# Patient Record
Sex: FEMALE | Race: WHITE | NOT HISPANIC OR LATINO | Employment: OTHER | URBAN - METROPOLITAN AREA
[De-identification: names, ages, dates, MRNs, and addresses within clinical notes are randomized per-mention and may not be internally consistent; named-entity substitution may affect disease eponyms.]

---

## 2017-01-01 ENCOUNTER — COMMUNICATION - HEALTHEAST (OUTPATIENT)
Dept: INTERNAL MEDICINE | Facility: CLINIC | Age: 72
End: 2017-01-01

## 2017-02-05 ENCOUNTER — COMMUNICATION - HEALTHEAST (OUTPATIENT)
Dept: INTERNAL MEDICINE | Facility: CLINIC | Age: 72
End: 2017-02-05

## 2017-02-05 DIAGNOSIS — G40.909 EPILEPSY (H): ICD-10-CM

## 2017-02-23 ENCOUNTER — OFFICE VISIT - HEALTHEAST (OUTPATIENT)
Dept: FAMILY MEDICINE | Facility: CLINIC | Age: 72
End: 2017-02-23

## 2017-02-23 DIAGNOSIS — R21 RASH AND NONSPECIFIC SKIN ERUPTION: ICD-10-CM

## 2017-03-09 ENCOUNTER — COMMUNICATION - HEALTHEAST (OUTPATIENT)
Dept: INTERNAL MEDICINE | Facility: CLINIC | Age: 72
End: 2017-03-09

## 2017-06-08 ENCOUNTER — OFFICE VISIT - HEALTHEAST (OUTPATIENT)
Dept: INTERNAL MEDICINE | Facility: CLINIC | Age: 72
End: 2017-06-08

## 2017-06-08 ENCOUNTER — RECORDS - HEALTHEAST (OUTPATIENT)
Dept: GENERAL RADIOLOGY | Age: 72
End: 2017-06-08

## 2017-06-08 DIAGNOSIS — M25.551 PAIN IN RIGHT HIP: ICD-10-CM

## 2017-06-08 DIAGNOSIS — R55 SYNCOPE AND COLLAPSE: ICD-10-CM

## 2017-06-08 DIAGNOSIS — M25.551 RIGHT HIP PAIN: ICD-10-CM

## 2017-06-08 DIAGNOSIS — G40.909 EPILEPSY (H): ICD-10-CM

## 2017-06-08 LAB
ATRIAL RATE - MUSE: 71 BPM
DIASTOLIC BLOOD PRESSURE - MUSE: NORMAL MMHG
INTERPRETATION ECG - MUSE: NORMAL
P AXIS - MUSE: -2 DEGREES
PR INTERVAL - MUSE: 110 MS
QRS DURATION - MUSE: 86 MS
QT - MUSE: 406 MS
QTC - MUSE: 441 MS
R AXIS - MUSE: -1 DEGREES
SYSTOLIC BLOOD PRESSURE - MUSE: NORMAL MMHG
T AXIS - MUSE: 5 DEGREES
VENTRICULAR RATE- MUSE: 71 BPM

## 2017-06-12 ENCOUNTER — COMMUNICATION - HEALTHEAST (OUTPATIENT)
Dept: INTERNAL MEDICINE | Facility: CLINIC | Age: 72
End: 2017-06-12

## 2017-06-28 ENCOUNTER — RECORDS - HEALTHEAST (OUTPATIENT)
Dept: ADMINISTRATIVE | Facility: OTHER | Age: 72
End: 2017-06-28

## 2017-07-06 ENCOUNTER — HOSPITAL ENCOUNTER (OUTPATIENT)
Dept: CT IMAGING | Facility: HOSPITAL | Age: 72
Discharge: HOME | End: 2017-07-06
Attending: PSYCHIATRY & NEUROLOGY

## 2017-07-06 DIAGNOSIS — G40.209 COMPLEX PARTIAL SEIZURE (H): ICD-10-CM

## 2017-07-26 ENCOUNTER — RECORDS - HEALTHEAST (OUTPATIENT)
Dept: ADMINISTRATIVE | Facility: OTHER | Age: 72
End: 2017-07-26

## 2017-07-31 ENCOUNTER — OFFICE VISIT - HEALTHEAST (OUTPATIENT)
Dept: FAMILY MEDICINE | Facility: CLINIC | Age: 72
End: 2017-07-31

## 2017-07-31 DIAGNOSIS — M54.9 BACK PAIN: ICD-10-CM

## 2017-08-01 ENCOUNTER — AMBULATORY - HEALTHEAST (OUTPATIENT)
Dept: NEUROSURGERY | Facility: CLINIC | Age: 72
End: 2017-08-01

## 2017-08-02 ENCOUNTER — COMMUNICATION - HEALTHEAST (OUTPATIENT)
Dept: NEUROSURGERY | Facility: CLINIC | Age: 72
End: 2017-08-02

## 2017-08-03 ENCOUNTER — HOSPITAL ENCOUNTER (OUTPATIENT)
Dept: PHYSICAL MEDICINE AND REHAB | Facility: CLINIC | Age: 72
Discharge: HOME | End: 2017-08-03
Attending: PHYSICIAN ASSISTANT

## 2017-08-03 DIAGNOSIS — M79.18 MYOFASCIAL PAIN: ICD-10-CM

## 2017-08-03 DIAGNOSIS — M54.50 LUMBALGIA: ICD-10-CM

## 2017-08-03 DIAGNOSIS — G47.9 SLEEP DISTURBANCE: ICD-10-CM

## 2017-08-04 ENCOUNTER — COMMUNICATION - HEALTHEAST (OUTPATIENT)
Dept: INTERNAL MEDICINE | Facility: CLINIC | Age: 72
End: 2017-08-04

## 2017-08-04 DIAGNOSIS — G40.909 EPILEPSY (H): ICD-10-CM

## 2017-08-07 ENCOUNTER — AMBULATORY - HEALTHEAST (OUTPATIENT)
Dept: NEUROSURGERY | Facility: CLINIC | Age: 72
End: 2017-08-07

## 2017-08-08 ENCOUNTER — HOSPITAL ENCOUNTER (OUTPATIENT)
Dept: CT IMAGING | Facility: HOSPITAL | Age: 72
Discharge: HOME | End: 2017-08-08
Attending: PHYSICIAN ASSISTANT

## 2017-08-08 DIAGNOSIS — M54.50 LUMBALGIA: ICD-10-CM

## 2017-08-08 DIAGNOSIS — G47.9 SLEEP DISTURBANCE: ICD-10-CM

## 2017-08-08 DIAGNOSIS — M79.18 MYOFASCIAL PAIN: ICD-10-CM

## 2017-08-10 ENCOUNTER — HOSPITAL ENCOUNTER (OUTPATIENT)
Dept: PHYSICAL MEDICINE AND REHAB | Facility: CLINIC | Age: 72
Discharge: HOME | End: 2017-08-10
Attending: PHYSICIAN ASSISTANT

## 2017-08-10 DIAGNOSIS — M47.816 FACET ARTHROPATHY, LUMBAR: ICD-10-CM

## 2017-08-10 DIAGNOSIS — M79.18 MYOFASCIAL PAIN: ICD-10-CM

## 2017-08-10 DIAGNOSIS — M54.50 LUMBALGIA: ICD-10-CM

## 2017-08-10 DIAGNOSIS — G47.9 SLEEP DISTURBANCE: ICD-10-CM

## 2017-08-16 ENCOUNTER — OFFICE VISIT - HEALTHEAST (OUTPATIENT)
Dept: PHYSICAL THERAPY | Facility: CLINIC | Age: 72
End: 2017-08-16

## 2017-08-16 DIAGNOSIS — G89.29 CHRONIC BILATERAL LOW BACK PAIN WITHOUT SCIATICA: ICD-10-CM

## 2017-08-16 DIAGNOSIS — M53.86 DECREASED ROM OF LUMBAR SPINE: ICD-10-CM

## 2017-08-16 DIAGNOSIS — M54.50 CHRONIC BILATERAL LOW BACK PAIN WITHOUT SCIATICA: ICD-10-CM

## 2017-08-16 DIAGNOSIS — M81.0 OSTEOPOROSIS: ICD-10-CM

## 2017-08-16 DIAGNOSIS — M62.81 GENERALIZED MUSCLE WEAKNESS: ICD-10-CM

## 2017-08-23 ENCOUNTER — OFFICE VISIT - HEALTHEAST (OUTPATIENT)
Dept: PHYSICAL THERAPY | Facility: CLINIC | Age: 72
End: 2017-08-23

## 2017-08-23 DIAGNOSIS — M81.0 OSTEOPOROSIS: ICD-10-CM

## 2017-08-23 DIAGNOSIS — M62.81 GENERALIZED MUSCLE WEAKNESS: ICD-10-CM

## 2017-08-23 DIAGNOSIS — G89.29 CHRONIC BILATERAL LOW BACK PAIN WITHOUT SCIATICA: ICD-10-CM

## 2017-08-23 DIAGNOSIS — M54.50 CHRONIC BILATERAL LOW BACK PAIN WITHOUT SCIATICA: ICD-10-CM

## 2017-08-23 DIAGNOSIS — M53.86 DECREASED ROM OF LUMBAR SPINE: ICD-10-CM

## 2017-08-30 ENCOUNTER — RECORDS - HEALTHEAST (OUTPATIENT)
Dept: ADMINISTRATIVE | Facility: OTHER | Age: 72
End: 2017-08-30

## 2017-09-14 ENCOUNTER — HOSPITAL ENCOUNTER (OUTPATIENT)
Dept: PHYSICAL MEDICINE AND REHAB | Facility: CLINIC | Age: 72
Discharge: HOME | End: 2017-09-14
Attending: PHYSICIAN ASSISTANT

## 2017-09-14 DIAGNOSIS — M47.816 FACET ARTHROPATHY, LUMBAR: ICD-10-CM

## 2017-09-14 DIAGNOSIS — M54.50 LUMBALGIA: ICD-10-CM

## 2017-09-14 DIAGNOSIS — M79.18 MYOFASCIAL PAIN: ICD-10-CM

## 2017-09-14 DIAGNOSIS — G47.9 SLEEP DISTURBANCE: ICD-10-CM

## 2017-10-28 ENCOUNTER — COMMUNICATION - HEALTHEAST (OUTPATIENT)
Dept: INTERNAL MEDICINE | Facility: CLINIC | Age: 72
End: 2017-10-28

## 2017-11-06 ENCOUNTER — COMMUNICATION - HEALTHEAST (OUTPATIENT)
Dept: PHYSICAL MEDICINE AND REHAB | Facility: CLINIC | Age: 72
End: 2017-11-06

## 2017-11-06 DIAGNOSIS — G47.9 SLEEP DISTURBANCE: ICD-10-CM

## 2017-11-06 DIAGNOSIS — M54.50 LUMBALGIA: ICD-10-CM

## 2017-11-06 DIAGNOSIS — M79.18 MYOFASCIAL PAIN: ICD-10-CM

## 2017-11-08 ENCOUNTER — AMBULATORY - HEALTHEAST (OUTPATIENT)
Dept: PHYSICAL MEDICINE AND REHAB | Facility: CLINIC | Age: 72
End: 2017-11-08

## 2017-11-08 DIAGNOSIS — M54.50 LUMBALGIA: ICD-10-CM

## 2017-11-14 ENCOUNTER — OFFICE VISIT - HEALTHEAST (OUTPATIENT)
Dept: INTERNAL MEDICINE | Facility: CLINIC | Age: 72
End: 2017-11-14

## 2017-11-14 DIAGNOSIS — R30.0 DYSURIA: ICD-10-CM

## 2017-11-14 ASSESSMENT — MIFFLIN-ST. JEOR: SCORE: 1124.11

## 2017-12-06 ENCOUNTER — COMMUNICATION - HEALTHEAST (OUTPATIENT)
Dept: PHYSICAL MEDICINE AND REHAB | Facility: CLINIC | Age: 72
End: 2017-12-06

## 2017-12-06 DIAGNOSIS — M54.50 LUMBALGIA: ICD-10-CM

## 2017-12-06 DIAGNOSIS — M79.18 MYOFASCIAL PAIN: ICD-10-CM

## 2017-12-06 DIAGNOSIS — G47.9 SLEEP DISTURBANCE: ICD-10-CM

## 2017-12-26 ENCOUNTER — OFFICE VISIT - HEALTHEAST (OUTPATIENT)
Dept: INTERNAL MEDICINE | Facility: CLINIC | Age: 72
End: 2017-12-26

## 2017-12-26 DIAGNOSIS — L98.9 SKIN LESIONS: ICD-10-CM

## 2017-12-26 DIAGNOSIS — M54.50 ACUTE BILATERAL LOW BACK PAIN WITHOUT SCIATICA: ICD-10-CM

## 2018-03-29 ENCOUNTER — OFFICE VISIT - HEALTHEAST (OUTPATIENT)
Dept: INTERNAL MEDICINE | Facility: CLINIC | Age: 73
End: 2018-03-29

## 2018-03-29 DIAGNOSIS — Z86.73 HISTORY OF CVA (CEREBROVASCULAR ACCIDENT): ICD-10-CM

## 2018-03-29 DIAGNOSIS — R41.3 MEMORY CHANGES: ICD-10-CM

## 2018-03-29 DIAGNOSIS — L98.9 SKIN LESION: ICD-10-CM

## 2018-03-29 DIAGNOSIS — G40.909 SEIZURE DISORDER (H): ICD-10-CM

## 2018-04-25 ENCOUNTER — OFFICE VISIT - HEALTHEAST (OUTPATIENT)
Dept: INTERNAL MEDICINE | Facility: CLINIC | Age: 73
End: 2018-04-25

## 2018-04-25 DIAGNOSIS — R41.3 MEMORY CHANGE: ICD-10-CM

## 2018-04-25 DIAGNOSIS — G40.909 SEIZURE DISORDER (H): ICD-10-CM

## 2018-04-25 DIAGNOSIS — E78.5 HYPERLIPIDEMIA: ICD-10-CM

## 2018-04-25 DIAGNOSIS — G40.909 EPILEPSY (H): ICD-10-CM

## 2018-04-25 DIAGNOSIS — Z66 DNR (DO NOT RESUSCITATE): ICD-10-CM

## 2018-04-25 DIAGNOSIS — M85.80 OSTEOPENIA: ICD-10-CM

## 2018-05-01 ENCOUNTER — AMBULATORY - HEALTHEAST (OUTPATIENT)
Dept: LAB | Facility: CLINIC | Age: 73
End: 2018-05-01

## 2018-05-01 DIAGNOSIS — G40.909 SEIZURE DISORDER (H): ICD-10-CM

## 2018-05-01 DIAGNOSIS — E78.5 HYPERLIPIDEMIA: ICD-10-CM

## 2018-05-01 LAB
ALBUMIN SERPL-MCNC: 3.8 G/DL (ref 3.5–5)
ALP SERPL-CCNC: 121 U/L (ref 45–120)
ALT SERPL W P-5'-P-CCNC: 21 U/L (ref 0–45)
ANION GAP SERPL CALCULATED.3IONS-SCNC: 7 MMOL/L (ref 5–18)
AST SERPL W P-5'-P-CCNC: 22 U/L (ref 0–40)
BILIRUB SERPL-MCNC: 0.3 MG/DL (ref 0–1)
BUN SERPL-MCNC: 15 MG/DL (ref 8–28)
CALCIUM SERPL-MCNC: 9.9 MG/DL (ref 8.5–10.5)
CHLORIDE BLD-SCNC: 106 MMOL/L (ref 98–107)
CHOLEST SERPL-MCNC: 182 MG/DL
CO2 SERPL-SCNC: 29 MMOL/L (ref 22–31)
CREAT SERPL-MCNC: 0.96 MG/DL (ref 0.6–1.1)
ERYTHROCYTE [DISTWIDTH] IN BLOOD BY AUTOMATED COUNT: 11 % (ref 11–14.5)
FASTING STATUS PATIENT QL REPORTED: YES
GFR SERPL CREATININE-BSD FRML MDRD: 57 ML/MIN/1.73M2
GLUCOSE BLD-MCNC: 97 MG/DL (ref 70–125)
HCT VFR BLD AUTO: 43.6 % (ref 35–47)
HDLC SERPL-MCNC: 79 MG/DL
HGB BLD-MCNC: 14.7 G/DL (ref 12–16)
LDLC SERPL CALC-MCNC: 87 MG/DL
MCH RBC QN AUTO: 33.5 PG (ref 27–34)
MCHC RBC AUTO-ENTMCNC: 33.8 G/DL (ref 32–36)
MCV RBC AUTO: 99 FL (ref 80–100)
PLATELET # BLD AUTO: 236 THOU/UL (ref 140–440)
PMV BLD AUTO: 7.4 FL (ref 7–10)
POTASSIUM BLD-SCNC: 5.3 MMOL/L (ref 3.5–5)
PROT SERPL-MCNC: 6.8 G/DL (ref 6–8)
RBC # BLD AUTO: 4.4 MILL/UL (ref 3.8–5.4)
SODIUM SERPL-SCNC: 142 MMOL/L (ref 136–145)
TRIGL SERPL-MCNC: 81 MG/DL
WBC: 5.3 THOU/UL (ref 4–11)

## 2018-05-02 ENCOUNTER — AMBULATORY - HEALTHEAST (OUTPATIENT)
Dept: INTERNAL MEDICINE | Facility: CLINIC | Age: 73
End: 2018-05-02

## 2018-05-02 DIAGNOSIS — E87.5 HYPERKALEMIA: ICD-10-CM

## 2018-05-03 ENCOUNTER — COMMUNICATION - HEALTHEAST (OUTPATIENT)
Dept: INTERNAL MEDICINE | Facility: CLINIC | Age: 73
End: 2018-05-03

## 2018-07-09 ENCOUNTER — OFFICE VISIT - HEALTHEAST (OUTPATIENT)
Dept: FAMILY MEDICINE | Facility: CLINIC | Age: 73
End: 2018-07-09

## 2018-07-09 DIAGNOSIS — S89.92XA KNEE INJURY, LEFT, INITIAL ENCOUNTER: ICD-10-CM

## 2018-07-09 DIAGNOSIS — M25.462 KNEE EFFUSION, LEFT: ICD-10-CM

## 2018-07-23 ENCOUNTER — RECORDS - HEALTHEAST (OUTPATIENT)
Dept: ADMINISTRATIVE | Facility: OTHER | Age: 73
End: 2018-07-23

## 2018-07-30 ENCOUNTER — HOSPITAL ENCOUNTER (OUTPATIENT)
Dept: CT IMAGING | Facility: HOSPITAL | Age: 73
Discharge: HOME | End: 2018-07-30
Attending: PSYCHIATRY & NEUROLOGY

## 2018-07-30 ENCOUNTER — COMMUNICATION - HEALTHEAST (OUTPATIENT)
Dept: TELEHEALTH | Facility: CLINIC | Age: 73
End: 2018-07-30

## 2018-07-30 DIAGNOSIS — G40.209 COMPLEX PARTIAL SEIZURE DISORDER (H): ICD-10-CM

## 2018-07-30 DIAGNOSIS — I67.1 CEREBRAL ANEURYSM, NONRUPTURED: ICD-10-CM

## 2018-07-30 DIAGNOSIS — R41.89 COGNITIVE DECLINE: ICD-10-CM

## 2018-07-31 ENCOUNTER — RECORDS - HEALTHEAST (OUTPATIENT)
Dept: LAB | Facility: HOSPITAL | Age: 73
End: 2018-07-31

## 2018-07-31 ENCOUNTER — AMBULATORY - HEALTHEAST (OUTPATIENT)
Dept: LAB | Facility: CLINIC | Age: 73
End: 2018-07-31

## 2018-07-31 ENCOUNTER — RECORDS - HEALTHEAST (OUTPATIENT)
Dept: ADMINISTRATIVE | Facility: OTHER | Age: 73
End: 2018-07-31

## 2018-07-31 DIAGNOSIS — E87.5 HYPERKALEMIA: ICD-10-CM

## 2018-07-31 LAB
FOLATE SERPL-MCNC: 12 NG/ML
PHENYTOIN (DILATIN) FREE: 2 UG/ML (ref 1–2)
PHENYTOIN SERPL-MCNC: 20.6 UG/ML (ref 10–20)
TSH SERPL DL<=0.005 MIU/L-ACNC: 3.03 UIU/ML (ref 0.3–5)
VIT B12 SERPL-MCNC: 685 PG/ML (ref 213–816)

## 2018-08-01 ENCOUNTER — COMMUNICATION - HEALTHEAST (OUTPATIENT)
Dept: FAMILY MEDICINE | Facility: CLINIC | Age: 73
End: 2018-08-01

## 2018-08-01 ENCOUNTER — COMMUNICATION - HEALTHEAST (OUTPATIENT)
Dept: SCHEDULING | Facility: CLINIC | Age: 73
End: 2018-08-01

## 2018-08-01 LAB
B BURGDOR IGG+IGM SER QL: 0.31 INDEX VALUE
POTASSIUM BLD-SCNC: 6.1 MMOL/L (ref 3.5–5)
T PALLIDUM AB SER QL: NEGATIVE

## 2018-08-02 LAB — METHYLMALONATE SERPL-SCNC: 0.27 UMOL/L (ref 0–0.4)

## 2018-11-06 ENCOUNTER — OFFICE VISIT - HEALTHEAST (OUTPATIENT)
Dept: NEUROSURGERY | Facility: CLINIC | Age: 73
End: 2018-11-06

## 2018-11-06 DIAGNOSIS — I67.1 ANEURYSM OF INTRACRANIAL PORTION OF RIGHT INTERNAL CAROTID ARTERY: ICD-10-CM

## 2018-11-06 DIAGNOSIS — I67.1 ANEURYSM OF INTRACRANIAL PORTION OF LEFT INTERNAL CAROTID ARTERY: ICD-10-CM

## 2018-11-06 ASSESSMENT — MIFFLIN-ST. JEOR: SCORE: 1047

## 2018-12-05 ENCOUNTER — AMBULATORY - HEALTHEAST (OUTPATIENT)
Dept: INTENSIVE CARE | Facility: CLINIC | Age: 73
End: 2018-12-05

## 2018-12-05 DIAGNOSIS — I67.1 CEREBRAL ANEURYSM, NONRUPTURED: ICD-10-CM

## 2018-12-11 ENCOUNTER — COMMUNICATION - HEALTHEAST (OUTPATIENT)
Dept: SCHEDULING | Facility: CLINIC | Age: 73
End: 2018-12-11

## 2018-12-11 ENCOUNTER — OFFICE VISIT - HEALTHEAST (OUTPATIENT)
Dept: INTERNAL MEDICINE | Facility: CLINIC | Age: 73
End: 2018-12-11

## 2018-12-11 DIAGNOSIS — Z01.818 PREOPERATIVE EXAMINATION: ICD-10-CM

## 2018-12-11 DIAGNOSIS — M85.80 OSTEOPENIA, UNSPECIFIED LOCATION: ICD-10-CM

## 2018-12-11 DIAGNOSIS — R41.89 COGNITIVE DECLINE: ICD-10-CM

## 2018-12-11 DIAGNOSIS — I67.1 NONRUPTURED CEREBRAL ANEURYSM: ICD-10-CM

## 2018-12-11 DIAGNOSIS — G40.209 PARTIAL SYMPTOMATIC EPILEPSY WITH COMPLEX PARTIAL SEIZURES, NOT INTRACTABLE, WITHOUT STATUS EPILEPTICUS (H): ICD-10-CM

## 2018-12-11 LAB
ANION GAP SERPL CALCULATED.3IONS-SCNC: 12 MMOL/L (ref 5–18)
BUN SERPL-MCNC: 19 MG/DL (ref 8–28)
CALCIUM SERPL-MCNC: 10.2 MG/DL (ref 8.5–10.5)
CHLORIDE BLD-SCNC: 106 MMOL/L (ref 98–107)
CO2 SERPL-SCNC: 26 MMOL/L (ref 22–31)
CREAT SERPL-MCNC: 0.81 MG/DL (ref 0.6–1.1)
ERYTHROCYTE [DISTWIDTH] IN BLOOD BY AUTOMATED COUNT: 10.8 % (ref 11–14.5)
GFR SERPL CREATININE-BSD FRML MDRD: >60 ML/MIN/1.73M2
GLUCOSE BLD-MCNC: 54 MG/DL (ref 70–125)
HCT VFR BLD AUTO: 44.6 % (ref 35–47)
HGB BLD-MCNC: 15.5 G/DL (ref 12–16)
MCH RBC QN AUTO: 33.8 PG (ref 27–34)
MCHC RBC AUTO-ENTMCNC: 34.8 G/DL (ref 32–36)
MCV RBC AUTO: 97 FL (ref 80–100)
PLATELET # BLD AUTO: 267 THOU/UL (ref 140–440)
PMV BLD AUTO: 7.4 FL (ref 7–10)
POTASSIUM BLD-SCNC: 5 MMOL/L (ref 3.5–5)
RBC # BLD AUTO: 4.59 MILL/UL (ref 3.8–5.4)
SODIUM SERPL-SCNC: 144 MMOL/L (ref 136–145)
WBC: 6.7 THOU/UL (ref 4–11)

## 2018-12-11 ASSESSMENT — MIFFLIN-ST. JEOR: SCORE: 1011.8

## 2018-12-12 ENCOUNTER — COMMUNICATION - HEALTHEAST (OUTPATIENT)
Dept: SCHEDULING | Facility: CLINIC | Age: 73
End: 2018-12-12

## 2018-12-12 ENCOUNTER — COMMUNICATION - HEALTHEAST (OUTPATIENT)
Dept: INTERNAL MEDICINE | Facility: CLINIC | Age: 73
End: 2018-12-12

## 2018-12-12 ENCOUNTER — AMBULATORY - HEALTHEAST (OUTPATIENT)
Dept: LAB | Facility: CLINIC | Age: 73
End: 2018-12-12

## 2018-12-12 ENCOUNTER — AMBULATORY - HEALTHEAST (OUTPATIENT)
Dept: INTERNAL MEDICINE | Facility: CLINIC | Age: 73
End: 2018-12-12

## 2018-12-12 DIAGNOSIS — G40.209 PARTIAL SYMPTOMATIC EPILEPSY WITH COMPLEX PARTIAL SEIZURES, NOT INTRACTABLE, WITHOUT STATUS EPILEPTICUS (H): ICD-10-CM

## 2018-12-12 DIAGNOSIS — G40.909 SEIZURE DISORDER (H): ICD-10-CM

## 2018-12-12 LAB
ALBUMIN SERPL-MCNC: 4.2 G/DL (ref 3.5–5)
ALP SERPL-CCNC: 118 U/L (ref 45–120)
ALT SERPL W P-5'-P-CCNC: 22 U/L (ref 0–45)
AST SERPL W P-5'-P-CCNC: 24 U/L (ref 0–40)
BILIRUB DIRECT SERPL-MCNC: 0.1 MG/DL
BILIRUB SERPL-MCNC: 0.3 MG/DL (ref 0–1)
PHENYTOIN (DILATIN) FREE: 2.8 UG/ML (ref 1–2)
PHENYTOIN SERPL-MCNC: 29.9 UG/ML (ref 10–20)
PROT SERPL-MCNC: 7.3 G/DL (ref 6–8)

## 2018-12-13 ENCOUNTER — ANESTHESIA - HEALTHEAST (OUTPATIENT)
Dept: INTERVENTIONAL RADIOLOGY/VASCULAR | Facility: CLINIC | Age: 73
End: 2018-12-13

## 2018-12-13 ENCOUNTER — COMMUNICATION - HEALTHEAST (OUTPATIENT)
Dept: INTERNAL MEDICINE | Facility: CLINIC | Age: 73
End: 2018-12-13

## 2018-12-13 LAB — PHENYTOIN (DILATIN) FREE: 2.5 UG/ML (ref 1–2)

## 2018-12-14 ENCOUNTER — HOSPITAL ENCOUNTER (OUTPATIENT)
Dept: RADIOLOGY | Facility: CLINIC | Age: 73
Discharge: HOME | End: 2018-12-14

## 2019-01-22 ENCOUNTER — OFFICE VISIT - HEALTHEAST (OUTPATIENT)
Dept: INTERNAL MEDICINE | Facility: CLINIC | Age: 74
End: 2019-01-22

## 2019-01-22 ENCOUNTER — AMBULATORY - HEALTHEAST (OUTPATIENT)
Dept: SCHEDULING | Facility: CLINIC | Age: 74
End: 2019-01-22

## 2019-01-22 DIAGNOSIS — R41.89 COGNITIVE DECLINE: ICD-10-CM

## 2019-01-22 DIAGNOSIS — Z12.31 VISIT FOR SCREENING MAMMOGRAM: ICD-10-CM

## 2019-01-22 DIAGNOSIS — I67.1 CEREBRAL ANEURYSM, NONRUPTURED: ICD-10-CM

## 2019-01-22 DIAGNOSIS — G40.209 PARTIAL SYMPTOMATIC EPILEPSY WITH COMPLEX PARTIAL SEIZURES, NOT INTRACTABLE, WITHOUT STATUS EPILEPTICUS (H): ICD-10-CM

## 2019-01-22 DIAGNOSIS — Z78.0 POST-MENOPAUSAL: ICD-10-CM

## 2019-02-18 ENCOUNTER — HOSPITAL ENCOUNTER (OUTPATIENT)
Dept: MAMMOGRAPHY | Facility: CLINIC | Age: 74
Discharge: HOME | End: 2019-02-18

## 2019-02-18 DIAGNOSIS — Z12.31 VISIT FOR SCREENING MAMMOGRAM: ICD-10-CM

## 2019-04-26 ENCOUNTER — COMMUNICATION - HEALTHEAST (OUTPATIENT)
Dept: INTERNAL MEDICINE | Facility: CLINIC | Age: 74
End: 2019-04-26

## 2019-04-26 DIAGNOSIS — E78.5 HYPERLIPIDEMIA, UNSPECIFIED HYPERLIPIDEMIA TYPE: ICD-10-CM

## 2019-09-01 ENCOUNTER — COMMUNICATION - HEALTHEAST (OUTPATIENT)
Dept: INTERNAL MEDICINE | Facility: CLINIC | Age: 74
End: 2019-09-01

## 2019-09-01 DIAGNOSIS — G40.909 EPILEPSY (H): ICD-10-CM

## 2019-09-18 ENCOUNTER — OFFICE VISIT - HEALTHEAST (OUTPATIENT)
Dept: INTERNAL MEDICINE | Facility: CLINIC | Age: 74
End: 2019-09-18

## 2019-09-18 DIAGNOSIS — I67.1 CEREBRAL ANEURYSM: ICD-10-CM

## 2019-09-18 DIAGNOSIS — I67.1 CEREBRAL ANEURYSM, NONRUPTURED: ICD-10-CM

## 2019-09-18 DIAGNOSIS — E78.2 MIXED HYPERLIPIDEMIA: ICD-10-CM

## 2019-09-18 DIAGNOSIS — Z72.0 TOBACCO USE: ICD-10-CM

## 2019-09-18 DIAGNOSIS — Z78.0 POST-MENOPAUSAL: ICD-10-CM

## 2019-09-18 DIAGNOSIS — G40.209 PARTIAL SYMPTOMATIC EPILEPSY WITH COMPLEX PARTIAL SEIZURES, NOT INTRACTABLE, WITHOUT STATUS EPILEPTICUS (H): ICD-10-CM

## 2019-09-18 LAB
ALBUMIN SERPL-MCNC: 4.1 G/DL (ref 3.5–5)
ALP SERPL-CCNC: 118 U/L (ref 45–120)
ALT SERPL W P-5'-P-CCNC: 13 U/L (ref 0–45)
ANION GAP SERPL CALCULATED.3IONS-SCNC: 9 MMOL/L (ref 5–18)
AST SERPL W P-5'-P-CCNC: 20 U/L (ref 0–40)
BILIRUB DIRECT SERPL-MCNC: 0.1 MG/DL
BILIRUB SERPL-MCNC: 0.3 MG/DL (ref 0–1)
BUN SERPL-MCNC: 15 MG/DL (ref 8–28)
CALCIUM SERPL-MCNC: 9.9 MG/DL (ref 8.5–10.5)
CHLORIDE BLD-SCNC: 104 MMOL/L (ref 98–107)
CHOLEST SERPL-MCNC: 195 MG/DL
CO2 SERPL-SCNC: 29 MMOL/L (ref 22–31)
CREAT SERPL-MCNC: 0.93 MG/DL (ref 0.6–1.1)
ERYTHROCYTE [DISTWIDTH] IN BLOOD BY AUTOMATED COUNT: 11.2 % (ref 11–14.5)
FASTING STATUS PATIENT QL REPORTED: NO
GFR SERPL CREATININE-BSD FRML MDRD: 59 ML/MIN/1.73M2
GLUCOSE BLD-MCNC: 64 MG/DL (ref 70–125)
HCT VFR BLD AUTO: 43.6 % (ref 35–47)
HDLC SERPL-MCNC: 82 MG/DL
HGB BLD-MCNC: 15.1 G/DL (ref 12–16)
LDLC SERPL CALC-MCNC: 93 MG/DL
MCH RBC QN AUTO: 34.2 PG (ref 27–34)
MCHC RBC AUTO-ENTMCNC: 34.7 G/DL (ref 32–36)
MCV RBC AUTO: 99 FL (ref 80–100)
PHENYTOIN SERPL-MCNC: 14.4 UG/ML (ref 10–20)
PLATELET # BLD AUTO: 273 THOU/UL (ref 140–440)
PMV BLD AUTO: 7.2 FL (ref 7–10)
POTASSIUM BLD-SCNC: 4.7 MMOL/L (ref 3.5–5)
PROT SERPL-MCNC: 6.9 G/DL (ref 6–8)
RBC # BLD AUTO: 4.42 MILL/UL (ref 3.8–5.4)
SODIUM SERPL-SCNC: 142 MMOL/L (ref 136–145)
TRIGL SERPL-MCNC: 100 MG/DL
WBC: 6 THOU/UL (ref 4–11)

## 2019-09-18 ASSESSMENT — MIFFLIN-ST. JEOR: SCORE: 1010.71

## 2019-09-19 ENCOUNTER — COMMUNICATION - HEALTHEAST (OUTPATIENT)
Dept: INTERNAL MEDICINE | Facility: CLINIC | Age: 74
End: 2019-09-19

## 2019-09-25 ENCOUNTER — COMMUNICATION - HEALTHEAST (OUTPATIENT)
Dept: NEUROSURGERY | Facility: CLINIC | Age: 74
End: 2019-09-25

## 2019-09-27 ENCOUNTER — COMMUNICATION - HEALTHEAST (OUTPATIENT)
Dept: INTERNAL MEDICINE | Facility: CLINIC | Age: 74
End: 2019-09-27

## 2019-09-27 DIAGNOSIS — G40.909 EPILEPSY (H): ICD-10-CM

## 2019-10-07 ENCOUNTER — COMMUNICATION - HEALTHEAST (OUTPATIENT)
Dept: INTERNAL MEDICINE | Facility: CLINIC | Age: 74
End: 2019-10-07

## 2019-10-07 DIAGNOSIS — G40.909 EPILEPSY (H): ICD-10-CM

## 2019-10-08 ENCOUNTER — COMMUNICATION - HEALTHEAST (OUTPATIENT)
Dept: ADMINISTRATIVE | Facility: CLINIC | Age: 74
End: 2019-10-08

## 2019-10-09 ENCOUNTER — OFFICE VISIT - HEALTHEAST (OUTPATIENT)
Dept: INTERNAL MEDICINE | Facility: CLINIC | Age: 74
End: 2019-10-09

## 2019-10-09 DIAGNOSIS — R41.89 COGNITIVE DECLINE: ICD-10-CM

## 2019-10-09 DIAGNOSIS — R07.81 RIB PAIN ON RIGHT SIDE: ICD-10-CM

## 2019-10-09 DIAGNOSIS — L82.1 SEBORRHEIC KERATOSES: ICD-10-CM

## 2019-10-09 DIAGNOSIS — I67.1 CEREBRAL ANEURYSM, NONRUPTURED: ICD-10-CM

## 2019-10-09 DIAGNOSIS — I67.1 CEREBRAL ANEURYSM: ICD-10-CM

## 2019-10-09 DIAGNOSIS — M85.88 OSTEOPENIA OF LUMBAR SPINE: ICD-10-CM

## 2019-10-15 ENCOUNTER — RECORDS - HEALTHEAST (OUTPATIENT)
Dept: ADMINISTRATIVE | Facility: OTHER | Age: 74
End: 2019-10-15

## 2019-10-16 ENCOUNTER — AMBULATORY - HEALTHEAST (OUTPATIENT)
Dept: NEUROLOGY | Facility: CLINIC | Age: 74
End: 2019-10-16

## 2019-10-16 DIAGNOSIS — R41.89 COGNITIVE DECLINE: ICD-10-CM

## 2019-10-17 ENCOUNTER — INFUSION - HEALTHEAST (OUTPATIENT)
Dept: INFUSION THERAPY | Facility: HOSPITAL | Age: 74
End: 2019-10-17

## 2019-10-17 ENCOUNTER — COMMUNICATION - HEALTHEAST (OUTPATIENT)
Dept: PEDIATRICS | Facility: CLINIC | Age: 74
End: 2019-10-17

## 2019-10-17 DIAGNOSIS — M85.80 OSTEOPENIA, UNSPECIFIED LOCATION: ICD-10-CM

## 2019-10-22 ENCOUNTER — OFFICE VISIT - HEALTHEAST (OUTPATIENT)
Dept: NEUROSURGERY | Facility: CLINIC | Age: 74
End: 2019-10-22

## 2019-10-22 DIAGNOSIS — I67.1 ANEURYSM OF INTRACRANIAL PORTION OF RIGHT INTERNAL CAROTID ARTERY: ICD-10-CM

## 2019-10-22 DIAGNOSIS — I67.1 NONRUPTURED CEREBRAL ANEURYSM: ICD-10-CM

## 2019-10-22 ASSESSMENT — MIFFLIN-ST. JEOR: SCORE: 1006.17

## 2019-10-29 ENCOUNTER — HOSPITAL ENCOUNTER (OUTPATIENT)
Dept: CT IMAGING | Facility: HOSPITAL | Age: 74
Discharge: HOME | End: 2019-10-29
Attending: NEUROLOGICAL SURGERY

## 2019-10-29 DIAGNOSIS — I67.1 NONRUPTURED CEREBRAL ANEURYSM: ICD-10-CM

## 2019-10-29 LAB
CREAT BLD-MCNC: 0.9 MG/DL (ref 0.6–1.1)
GFR SERPL CREATININE-BSD FRML MDRD: >60 ML/MIN/1.73M2

## 2019-12-03 ENCOUNTER — COMMUNICATION - HEALTHEAST (OUTPATIENT)
Dept: INTERNAL MEDICINE | Facility: CLINIC | Age: 74
End: 2019-12-03

## 2019-12-03 DIAGNOSIS — I67.1 CEREBRAL ANEURYSM, NONRUPTURED: ICD-10-CM

## 2019-12-03 DIAGNOSIS — E78.2 MIXED HYPERLIPIDEMIA: ICD-10-CM

## 2020-01-06 ENCOUNTER — COMMUNICATION - HEALTHEAST (OUTPATIENT)
Dept: INTERNAL MEDICINE | Facility: CLINIC | Age: 75
End: 2020-01-06

## 2020-01-06 DIAGNOSIS — G40.909 EPILEPSY (H): ICD-10-CM

## 2020-01-28 ENCOUNTER — RECORDS - HEALTHEAST (OUTPATIENT)
Dept: ADMINISTRATIVE | Facility: OTHER | Age: 75
End: 2020-01-28

## 2020-02-24 ENCOUNTER — COMMUNICATION - HEALTHEAST (OUTPATIENT)
Dept: NEUROSURGERY | Facility: CLINIC | Age: 75
End: 2020-02-24

## 2020-02-24 DIAGNOSIS — I67.1 NONRUPTURED CEREBRAL ANEURYSM: ICD-10-CM

## 2020-03-02 ENCOUNTER — AMBULATORY - HEALTHEAST (OUTPATIENT)
Dept: INTERVENTIONAL RADIOLOGY/VASCULAR | Facility: CLINIC | Age: 75
End: 2020-03-02

## 2020-03-02 DIAGNOSIS — I67.1 NONRUPTURED CEREBRAL ANEURYSM: ICD-10-CM

## 2020-03-11 ENCOUNTER — OFFICE VISIT - HEALTHEAST (OUTPATIENT)
Dept: INTERNAL MEDICINE | Facility: CLINIC | Age: 75
End: 2020-03-11

## 2020-03-11 DIAGNOSIS — Z72.0 TOBACCO USE: ICD-10-CM

## 2020-03-11 DIAGNOSIS — I72.0 ANEURYSM OF CAROTID ARTERY (H): ICD-10-CM

## 2020-03-11 DIAGNOSIS — Z01.818 PREOPERATIVE EXAMINATION: ICD-10-CM

## 2020-03-11 DIAGNOSIS — G40.909 EPILEPSY (H): ICD-10-CM

## 2020-03-11 DIAGNOSIS — I67.1 CEREBRAL ANEURYSM, NONRUPTURED: ICD-10-CM

## 2020-03-11 DIAGNOSIS — E78.2 MIXED HYPERLIPIDEMIA: ICD-10-CM

## 2020-03-11 LAB
ANION GAP SERPL CALCULATED.3IONS-SCNC: 9 MMOL/L (ref 5–18)
BUN SERPL-MCNC: 14 MG/DL (ref 8–28)
CALCIUM SERPL-MCNC: 9.7 MG/DL (ref 8.5–10.5)
CHLORIDE BLD-SCNC: 106 MMOL/L (ref 98–107)
CHOLEST SERPL-MCNC: 238 MG/DL
CO2 SERPL-SCNC: 28 MMOL/L (ref 22–31)
CREAT SERPL-MCNC: 0.84 MG/DL (ref 0.6–1.1)
FASTING STATUS PATIENT QL REPORTED: YES
GFR SERPL CREATININE-BSD FRML MDRD: >60 ML/MIN/1.73M2
GLUCOSE BLD-MCNC: 98 MG/DL (ref 70–125)
HDLC SERPL-MCNC: 86 MG/DL
LDLC SERPL CALC-MCNC: 136 MG/DL
POTASSIUM BLD-SCNC: 4.6 MMOL/L (ref 3.5–5)
SODIUM SERPL-SCNC: 143 MMOL/L (ref 136–145)
TRIGL SERPL-MCNC: 81 MG/DL

## 2020-03-11 ASSESSMENT — MIFFLIN-ST. JEOR: SCORE: 1037.93

## 2020-03-12 ENCOUNTER — COMMUNICATION - HEALTHEAST (OUTPATIENT)
Dept: INTERNAL MEDICINE | Facility: CLINIC | Age: 75
End: 2020-03-12

## 2020-03-12 DIAGNOSIS — E78.2 MIXED HYPERLIPIDEMIA: ICD-10-CM

## 2020-03-13 ENCOUNTER — COMMUNICATION - HEALTHEAST (OUTPATIENT)
Dept: FAMILY MEDICINE | Facility: CLINIC | Age: 75
End: 2020-03-13

## 2020-03-13 ENCOUNTER — HOSPITAL ENCOUNTER (OUTPATIENT)
Dept: INTERVENTIONAL RADIOLOGY/VASCULAR | Facility: CLINIC | Age: 75
Discharge: HOME | End: 2020-03-13
Admitting: RADIOLOGY

## 2020-03-13 DIAGNOSIS — I67.1 NONRUPTURED CEREBRAL ANEURYSM: ICD-10-CM

## 2020-03-13 LAB
HGB BLD-MCNC: 14.8 G/DL (ref 12–16)
PLATELET # BLD AUTO: 198 THOU/UL (ref 140–440)
POTASSIUM BLD-SCNC: 4.4 MMOL/L (ref 3.5–5)

## 2020-03-13 ASSESSMENT — MIFFLIN-ST. JEOR: SCORE: 1049.55

## 2020-03-14 ENCOUNTER — HOSPITAL ENCOUNTER (OUTPATIENT)
Dept: CT IMAGING | Facility: HOSPITAL | Age: 75
Discharge: HOME | End: 2020-03-14
Attending: NEUROLOGICAL SURGERY

## 2020-03-14 DIAGNOSIS — I67.1 NONRUPTURED CEREBRAL ANEURYSM: ICD-10-CM

## 2020-04-20 ENCOUNTER — COMMUNICATION - HEALTHEAST (OUTPATIENT)
Dept: INTERNAL MEDICINE | Facility: CLINIC | Age: 75
End: 2020-04-20

## 2020-04-20 DIAGNOSIS — G40.909 EPILEPSY (H): ICD-10-CM

## 2020-08-21 ENCOUNTER — OFFICE VISIT - HEALTHEAST (OUTPATIENT)
Dept: FAMILY MEDICINE | Facility: CLINIC | Age: 75
End: 2020-08-21

## 2020-08-21 DIAGNOSIS — M79.644 PAIN OF FINGER OF RIGHT HAND: ICD-10-CM

## 2020-08-21 DIAGNOSIS — G40.209 PARTIAL SYMPTOMATIC EPILEPSY WITH COMPLEX PARTIAL SEIZURES, NOT INTRACTABLE, WITHOUT STATUS EPILEPTICUS (H): ICD-10-CM

## 2020-08-21 DIAGNOSIS — L82.1 SEBORRHEIC KERATOSES: ICD-10-CM

## 2020-08-21 DIAGNOSIS — Z23 NEED FOR SHINGLES VACCINE: ICD-10-CM

## 2020-08-21 LAB
ALBUMIN SERPL-MCNC: 4 G/DL (ref 3.5–5)
ALP SERPL-CCNC: 116 U/L (ref 45–120)
ALT SERPL W P-5'-P-CCNC: 24 U/L (ref 0–45)
ANION GAP SERPL CALCULATED.3IONS-SCNC: 9 MMOL/L (ref 5–18)
AST SERPL W P-5'-P-CCNC: 19 U/L (ref 0–40)
BASOPHILS # BLD AUTO: 0 THOU/UL (ref 0–0.2)
BASOPHILS NFR BLD AUTO: 1 % (ref 0–2)
BILIRUB SERPL-MCNC: 0.3 MG/DL (ref 0–1)
BUN SERPL-MCNC: 15 MG/DL (ref 8–28)
CALCIUM SERPL-MCNC: 10.1 MG/DL (ref 8.5–10.5)
CHLORIDE BLD-SCNC: 106 MMOL/L (ref 98–107)
CO2 SERPL-SCNC: 29 MMOL/L (ref 22–31)
CREAT SERPL-MCNC: 0.9 MG/DL (ref 0.6–1.1)
EOSINOPHIL # BLD AUTO: 0.1 THOU/UL (ref 0–0.4)
EOSINOPHIL NFR BLD AUTO: 2 % (ref 0–6)
ERYTHROCYTE [DISTWIDTH] IN BLOOD BY AUTOMATED COUNT: 10.6 % (ref 11–14.5)
GFR SERPL CREATININE-BSD FRML MDRD: >60 ML/MIN/1.73M2
GLUCOSE BLD-MCNC: 101 MG/DL (ref 70–125)
HCT VFR BLD AUTO: 44.3 % (ref 35–47)
HGB BLD-MCNC: 15.3 G/DL (ref 12–16)
LYMPHOCYTES # BLD AUTO: 2.3 THOU/UL (ref 0.8–4.4)
LYMPHOCYTES NFR BLD AUTO: 40 % (ref 20–40)
MCH RBC QN AUTO: 34.6 PG (ref 27–34)
MCHC RBC AUTO-ENTMCNC: 34.5 G/DL (ref 32–36)
MCV RBC AUTO: 100 FL (ref 80–100)
MONOCYTES # BLD AUTO: 0.4 THOU/UL (ref 0–0.9)
MONOCYTES NFR BLD AUTO: 8 % (ref 2–10)
NEUTROPHILS # BLD AUTO: 2.9 THOU/UL (ref 2–7.7)
NEUTROPHILS NFR BLD AUTO: 50 % (ref 50–70)
PHENYTOIN SERPL-MCNC: 22.8 UG/ML (ref 10–20)
PLATELET # BLD AUTO: 225 THOU/UL (ref 140–440)
PMV BLD AUTO: 7.2 FL (ref 7–10)
POTASSIUM BLD-SCNC: 5 MMOL/L (ref 3.5–5)
PROT SERPL-MCNC: 6.8 G/DL (ref 6–8)
RBC # BLD AUTO: 4.42 MILL/UL (ref 3.8–5.4)
SODIUM SERPL-SCNC: 144 MMOL/L (ref 136–145)
WBC: 5.8 THOU/UL (ref 4–11)

## 2020-08-21 ASSESSMENT — MIFFLIN-ST. JEOR: SCORE: 1022.05

## 2020-08-31 ENCOUNTER — COMMUNICATION - HEALTHEAST (OUTPATIENT)
Dept: FAMILY MEDICINE | Facility: CLINIC | Age: 75
End: 2020-08-31

## 2020-11-02 ENCOUNTER — COMMUNICATION - HEALTHEAST (OUTPATIENT)
Dept: INTERNAL MEDICINE | Facility: CLINIC | Age: 75
End: 2020-11-02

## 2020-11-02 DIAGNOSIS — G40.909 EPILEPSY (H): ICD-10-CM

## 2020-11-23 ENCOUNTER — AMBULATORY - HEALTHEAST (OUTPATIENT)
Dept: LAB | Facility: CLINIC | Age: 75
End: 2020-11-23

## 2020-11-23 ENCOUNTER — OFFICE VISIT - HEALTHEAST (OUTPATIENT)
Dept: INTERNAL MEDICINE | Facility: CLINIC | Age: 75
End: 2020-11-23

## 2020-11-23 DIAGNOSIS — G40.909 EPILEPSY (H): ICD-10-CM

## 2020-11-23 DIAGNOSIS — Z12.31 VISIT FOR SCREENING MAMMOGRAM: ICD-10-CM

## 2020-11-23 LAB — PHENYTOIN SERPL-MCNC: 20.3 UG/ML (ref 10–20)

## 2020-11-24 ENCOUNTER — COMMUNICATION - HEALTHEAST (OUTPATIENT)
Dept: INTERNAL MEDICINE | Facility: CLINIC | Age: 75
End: 2020-11-24

## 2020-11-24 DIAGNOSIS — G40.909 EPILEPSY (H): ICD-10-CM

## 2021-01-22 ENCOUNTER — HOSPITAL ENCOUNTER (OUTPATIENT)
Dept: MAMMOGRAPHY | Facility: CLINIC | Age: 76
Discharge: HOME | End: 2021-01-22

## 2021-01-22 ENCOUNTER — COMMUNICATION - HEALTHEAST (OUTPATIENT)
Dept: INTERNAL MEDICINE | Facility: CLINIC | Age: 76
End: 2021-01-22

## 2021-01-22 DIAGNOSIS — Z12.31 VISIT FOR SCREENING MAMMOGRAM: ICD-10-CM

## 2021-02-11 ENCOUNTER — COMMUNICATION - HEALTHEAST (OUTPATIENT)
Dept: INTERNAL MEDICINE | Facility: CLINIC | Age: 76
End: 2021-02-11

## 2021-02-11 DIAGNOSIS — G40.909 EPILEPSY (H): ICD-10-CM

## 2021-02-11 RX ORDER — PHENYTOIN SODIUM 100 MG/1
CAPSULE, EXTENDED RELEASE ORAL
Qty: 270 CAPSULE | Status: SHIPPED | OUTPATIENT
Start: 2021-02-11 | End: 2021-11-15

## 2021-02-11 RX ORDER — PHENYTOIN SODIUM 100 MG/1
200 CAPSULE, EXTENDED RELEASE ORAL EVERY MORNING
Refills: 1 | Status: SHIPPED | OUTPATIENT
Start: 2021-02-11 | End: 2022-02-21

## 2021-02-11 RX ORDER — PHENYTOIN SODIUM 100 MG/1
100 CAPSULE, EXTENDED RELEASE ORAL EVERY EVENING
Refills: 1 | Status: SHIPPED | OUTPATIENT
Start: 2021-02-11 | End: 2022-02-21

## 2021-03-01 ENCOUNTER — OFFICE VISIT - HEALTHEAST (OUTPATIENT)
Dept: INTERNAL MEDICINE | Facility: CLINIC | Age: 76
End: 2021-03-01

## 2021-03-01 DIAGNOSIS — L60.0 INGROWN TOENAIL OF RIGHT FOOT: ICD-10-CM

## 2021-03-01 DIAGNOSIS — R03.0 ELEVATED BLOOD PRESSURE READING WITHOUT DIAGNOSIS OF HYPERTENSION: ICD-10-CM

## 2021-03-01 DIAGNOSIS — L82.1 SEBORRHEIC KERATOSES: ICD-10-CM

## 2021-03-16 ENCOUNTER — AMBULATORY - HEALTHEAST (OUTPATIENT)
Dept: NURSING | Facility: CLINIC | Age: 76
End: 2021-03-16

## 2021-03-16 DIAGNOSIS — I10 BENIGN ESSENTIAL HYPERTENSION: ICD-10-CM

## 2021-03-17 ENCOUNTER — COMMUNICATION - HEALTHEAST (OUTPATIENT)
Dept: INTERNAL MEDICINE | Facility: CLINIC | Age: 76
End: 2021-03-17

## 2021-03-23 ENCOUNTER — OFFICE VISIT - HEALTHEAST (OUTPATIENT)
Dept: PODIATRY | Facility: CLINIC | Age: 76
End: 2021-03-23

## 2021-03-23 DIAGNOSIS — L60.2 ONYCHAUXIS: ICD-10-CM

## 2021-03-23 DIAGNOSIS — B35.1 NAIL FUNGUS: ICD-10-CM

## 2021-03-23 DIAGNOSIS — L60.0 INGROWN TOENAIL: ICD-10-CM

## 2021-03-23 ASSESSMENT — MIFFLIN-ST. JEOR: SCORE: 1027.95

## 2021-04-05 ENCOUNTER — RECORDS - HEALTHEAST (OUTPATIENT)
Dept: ADMINISTRATIVE | Facility: OTHER | Age: 76
End: 2021-04-05

## 2021-04-07 ENCOUNTER — AMBULATORY - HEALTHEAST (OUTPATIENT)
Dept: PHARMACY | Facility: HOSPITAL | Age: 76
End: 2021-04-07

## 2021-04-30 ENCOUNTER — HOSPITAL ENCOUNTER (OUTPATIENT)
Dept: CT IMAGING | Facility: HOSPITAL | Age: 76
Discharge: HOME | End: 2021-04-30

## 2021-04-30 DIAGNOSIS — I67.1 CEREBRAL ANEURYSM, NONRUPTURED: ICD-10-CM

## 2021-04-30 LAB
CREAT BLD-MCNC: 0.9 MG/DL (ref 0.6–1.1)
GFR SERPL CREATININE-BSD FRML MDRD: >60 ML/MIN/1.73M2

## 2021-05-11 ENCOUNTER — AMBULATORY - HEALTHEAST (OUTPATIENT)
Dept: PHARMACY | Facility: HOSPITAL | Age: 76
End: 2021-05-11

## 2021-05-26 ENCOUNTER — RECORDS - HEALTHEAST (OUTPATIENT)
Dept: ADMINISTRATIVE | Facility: CLINIC | Age: 76
End: 2021-05-26

## 2021-05-26 VITALS
OXYGEN SATURATION: 98 % | TEMPERATURE: 98 F | DIASTOLIC BLOOD PRESSURE: 63 MMHG | SYSTOLIC BLOOD PRESSURE: 150 MMHG | HEART RATE: 66 BPM

## 2021-05-27 ENCOUNTER — RECORDS - HEALTHEAST (OUTPATIENT)
Dept: ADMINISTRATIVE | Facility: CLINIC | Age: 76
End: 2021-05-27

## 2021-05-27 VITALS — SYSTOLIC BLOOD PRESSURE: 216 MMHG | DIASTOLIC BLOOD PRESSURE: 65 MMHG

## 2021-05-27 VITALS — DIASTOLIC BLOOD PRESSURE: 58 MMHG | SYSTOLIC BLOOD PRESSURE: 162 MMHG

## 2021-05-28 ENCOUNTER — RECORDS - HEALTHEAST (OUTPATIENT)
Dept: ADMINISTRATIVE | Facility: CLINIC | Age: 76
End: 2021-05-28

## 2021-05-28 NOTE — TELEPHONE ENCOUNTER
Refill Approved    Rx renewed per Medication Renewal Policy. Medication was last renewed on 4/25/2018.    Last OV: 1/22/2019.    Jordi Shabazz, [facility] Triage/Med Refill 4/27/2019     Requested Prescriptions   Pending Prescriptions Disp Refills     atorvastatin (LIPITOR) 40 MG tablet [Pharmacy Med Name: Atorvastatin Calcium Oral Tablet 40 MG] 90 tablet 2     Sig: Take 1 tablet (40 mg total) by mouth at bedtime.       Statins Refill Protocol (Hmg CoA Reductase Inhibitors) Passed - 4/26/2019  6:07 PM        Passed - PCP or prescribing provider visit in past 12 months      Last office visit with prescriber/PCP: 1/22/2019 Luz Cedillo FNP OR same dept: 1/22/2019 Luz Cedillo FNP OR same specialty: 1/22/2019 Luz Cedillo FNP  Last physical: 12/11/2018 Last MTM visit: Visit date not found   Next visit within 3 mo: Visit date not found  Next physical within 3 mo: Visit date not found  Prescriber OR PCP: HAIR Monteiro  Last diagnosis associated with med order: There are no diagnoses linked to this encounter.  If protocol passes may refill for 12 months if within 3 months of last provider visit (or a total of 15 months).                         
No

## 2021-05-29 ENCOUNTER — RECORDS - HEALTHEAST (OUTPATIENT)
Dept: ADMINISTRATIVE | Facility: CLINIC | Age: 76
End: 2021-05-29

## 2021-05-30 ENCOUNTER — RECORDS - HEALTHEAST (OUTPATIENT)
Dept: ADMINISTRATIVE | Facility: CLINIC | Age: 76
End: 2021-05-30

## 2021-05-30 VITALS — BODY MASS INDEX: 26.21 KG/M2 | WEIGHT: 152.7 LBS

## 2021-05-31 VITALS — BODY MASS INDEX: 24.1 KG/M2 | WEIGHT: 140.4 LBS

## 2021-05-31 VITALS — WEIGHT: 141 LBS | BODY MASS INDEX: 24.2 KG/M2

## 2021-05-31 VITALS — WEIGHT: 132.2 LBS | BODY MASS INDEX: 22.69 KG/M2

## 2021-05-31 VITALS — WEIGHT: 142 LBS | BODY MASS INDEX: 24.24 KG/M2 | HEIGHT: 64 IN

## 2021-05-31 VITALS — WEIGHT: 138.8 LBS | BODY MASS INDEX: 23.82 KG/M2

## 2021-05-31 VITALS — HEIGHT: 64 IN | BODY MASS INDEX: 24.1 KG/M2

## 2021-05-31 NOTE — TELEPHONE ENCOUNTER
RN cannot approve Refill Request    RN can NOT refill this medication PCP messaged that patient is overdue for Labs. Last office visit: 1/22/2019 Luz Cedillo FNP Last Physical: 12/11/2018 Last MTM visit: Visit date not found Last visit same specialty: 1/22/2019 Luz Cedillo FNP.  Next visit within 3 mo: Visit date not found  Next physical within 3 mo: Visit date not found      Jordi Shabazz, [facility] Triage/Med Refill 9/1/2019    Requested Prescriptions   Pending Prescriptions Disp Refills     phenytoin (DILANTIN) 100 MG ER capsule [Pharmacy Med Name: PHENYTOIN SODIUM 100MG EXT CAPSULES] 360 capsule 0     Sig: TAKE 2 CAPSULES BY MOUTH TWICE DAILY       Phenytoin Refill Protocol Failed - 9/1/2019  3:36 PM        Failed - Folate level in last 12 months     Folate   Date Value Ref Range Status   07/31/2018 12.0 >=3.5 ng/mL Final             Passed - LFT or AST or ALT in last 12 months     Albumin   Date Value Ref Range Status   12/12/2018 4.2 3.5 - 5.0 g/dL Final     Bilirubin, Total   Date Value Ref Range Status   12/12/2018 0.3 0.0 - 1.0 mg/dL Final     Bilirubin, Direct   Date Value Ref Range Status   12/12/2018 0.1 <=0.5 mg/dL Final     Alkaline Phosphatase   Date Value Ref Range Status   12/12/2018 118 45 - 120 U/L Final     AST   Date Value Ref Range Status   12/12/2018 24 0 - 40 U/L Final     ALT   Date Value Ref Range Status   12/12/2018 22 0 - 45 U/L Final     Protein, Total   Date Value Ref Range Status   12/12/2018 7.3 6.0 - 8.0 g/dL Final                Passed - CBC w/o diff (Hemogram 2) in last year      WBC   Date Value Ref Range Status   12/11/2018 6.7 4.0 - 11.0 thou/uL Final     RBC   Date Value Ref Range Status   12/11/2018 4.59 3.80 - 5.40 mill/uL Final     Hemoglobin   Date Value Ref Range Status   12/11/2018 15.5 12.0 - 16.0 g/dL Final     Hematocrit   Date Value Ref Range Status   12/11/2018 44.6 35.0 - 47.0 % Final     MCV   Date Value Ref Range Status   12/11/2018 97 80  - 100 fL Final     MCH   Date Value Ref Range Status   12/11/2018 33.8 27.0 - 34.0 pg Final     MCHC   Date Value Ref Range Status   12/11/2018 34.8 32.0 - 36.0 g/dL Final     RDW   Date Value Ref Range Status   12/11/2018 10.8 (L) 11.0 - 14.5 % Final     Platelets   Date Value Ref Range Status   12/11/2018 267 140 - 440 thou/uL Final     MPV   Date Value Ref Range Status   12/11/2018 7.4 7.0 - 10.0 fL Final                Passed - Free phenytoin level in last 12 months     Phenytoin, Free   Date Value Ref Range Status   12/12/2018 2.5 (H) 1.0 - 2.0 ug/mL Final             Passed - PCP or prescribing provider visit in past 12 months       Last office visit with prescriber/PCP: 1/22/2019 Luz Cedillo FNP OR same dept: 1/22/2019 Luz Cedillo FNP OR same specialty: 1/22/2019 Luz Cedillo FNP  Last physical: 12/11/2018 Last MTM visit: Visit date not found   Next visit within 3 mo: Visit date not found  Next physical within 3 mo: Visit date not found  Prescriber OR PCP: HAIR Monteiro  Last diagnosis associated with med order: 1. Epilepsy (H)  - phenytoin (DILANTIN) 100 MG ER capsule [Pharmacy Med Name: PHENYTOIN SODIUM 100MG EXT CAPSULES]; TAKE 2 CAPSULES BY MOUTH TWICE DAILY  Dispense: 360 capsule; Refill: 0    If protocol passes may refill for 12 months if within 3 months of last provider visit (or a total of 15 months).

## 2021-06-01 ENCOUNTER — OFFICE VISIT - HEALTHEAST (OUTPATIENT)
Dept: INTERNAL MEDICINE | Facility: CLINIC | Age: 76
End: 2021-06-01

## 2021-06-01 VITALS — WEIGHT: 136 LBS | BODY MASS INDEX: 23.34 KG/M2

## 2021-06-01 VITALS — WEIGHT: 136.9 LBS | BODY MASS INDEX: 23.5 KG/M2

## 2021-06-01 DIAGNOSIS — G40.209 PARTIAL SYMPTOMATIC EPILEPSY WITH COMPLEX PARTIAL SEIZURES, NOT INTRACTABLE, WITHOUT STATUS EPILEPTICUS (H): ICD-10-CM

## 2021-06-01 DIAGNOSIS — Z72.0 TOBACCO USE: ICD-10-CM

## 2021-06-01 DIAGNOSIS — L82.1 SEBORRHEIC KERATOSES: ICD-10-CM

## 2021-06-01 DIAGNOSIS — R41.89 COGNITIVE DECLINE: ICD-10-CM

## 2021-06-01 DIAGNOSIS — I10 BENIGN ESSENTIAL HYPERTENSION: ICD-10-CM

## 2021-06-01 DIAGNOSIS — I10 ESSENTIAL HYPERTENSION: ICD-10-CM

## 2021-06-01 LAB
ALBUMIN SERPL-MCNC: 3.9 G/DL (ref 3.5–5)
ALP SERPL-CCNC: 122 U/L (ref 45–120)
ALT SERPL W P-5'-P-CCNC: 17 U/L (ref 0–45)
ANION GAP SERPL CALCULATED.3IONS-SCNC: 9 MMOL/L (ref 5–18)
AST SERPL W P-5'-P-CCNC: 19 U/L (ref 0–40)
BILIRUB SERPL-MCNC: 0.3 MG/DL (ref 0–1)
BUN SERPL-MCNC: 17 MG/DL (ref 8–28)
CALCIUM SERPL-MCNC: 9.3 MG/DL (ref 8.5–10.5)
CHLORIDE BLD-SCNC: 107 MMOL/L (ref 98–107)
CO2 SERPL-SCNC: 27 MMOL/L (ref 22–31)
CREAT SERPL-MCNC: 0.83 MG/DL (ref 0.6–1.1)
ERYTHROCYTE [DISTWIDTH] IN BLOOD BY AUTOMATED COUNT: 13.1 % (ref 11–14.5)
FOLATE SERPL-MCNC: 17.1 NG/ML
GFR SERPL CREATININE-BSD FRML MDRD: >60 ML/MIN/1.73M2
GLUCOSE BLD-MCNC: 96 MG/DL (ref 70–125)
HCT VFR BLD AUTO: 38.6 % (ref 35–47)
HGB BLD-MCNC: 12.6 G/DL (ref 12–16)
MCH RBC QN AUTO: 33.3 PG (ref 27–34)
MCHC RBC AUTO-ENTMCNC: 32.6 G/DL (ref 32–36)
MCV RBC AUTO: 102 FL (ref 80–100)
PHENYTOIN SERPL-MCNC: 10.5 UG/ML (ref 10–20)
PLATELET # BLD AUTO: 221 THOU/UL (ref 140–440)
PMV BLD AUTO: 9.3 FL (ref 7–10)
POTASSIUM BLD-SCNC: 5.2 MMOL/L (ref 3.5–5)
PROT SERPL-MCNC: 6.5 G/DL (ref 6–8)
RBC # BLD AUTO: 3.78 MILL/UL (ref 3.8–5.4)
SODIUM SERPL-SCNC: 143 MMOL/L (ref 136–145)
VIT B12 SERPL-MCNC: 809 PG/ML (ref 213–816)
WBC: 6.1 THOU/UL (ref 4–11)

## 2021-06-01 RX ORDER — VARENICLINE TARTRATE 1 MG/1
TABLET, FILM COATED ORAL
Qty: 60 TABLET | Refills: 2 | Status: SHIPPED | OUTPATIENT
Start: 2021-06-01 | End: 2021-11-15

## 2021-06-01 RX ORDER — HYDROCHLOROTHIAZIDE 25 MG/1
25 TABLET ORAL DAILY
Qty: 90 TABLET | Refills: 1 | Status: SHIPPED | OUTPATIENT
Start: 2021-06-01 | End: 2022-06-29
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
## 2021-06-01 NOTE — TELEPHONE ENCOUNTER
LM- LET PATIENT KNOW THAT WALDEMAR DOES FEEL COMFORTABLE WITH HER WAITING UNTIL HER SCHEDULED APPT ON 10/22

## 2021-06-01 NOTE — PATIENT INSTRUCTIONS - HE
- Schedule an appointment with neurology at [facility] (Dr. Pruitt)   - Schedule an appointment with neurosurgery for aneurysms

## 2021-06-01 NOTE — ASSESSMENT & PLAN NOTE
Labs for an organic cause of memory loss. Likely related to a combination of phenytoin use, vascularchanges. If labs are normal, continue with referral to neuropsychology for further evaluation then follow up with neurology. Could consider Aricept if indicated

## 2021-06-01 NOTE — TELEPHONE ENCOUNTER
phenytoin (DILANTIN) 100 MG ER capsule 360 capsule 3 4/25/2018  No   Sig - Route: Take 2 capsules (200 mg total) by mouth 2 (two) times a day. - Oral   Sent to pharmacy as: phenytoin (DILANTIN) 100 MG ER capsule   E-Prescribing Status: Receipt confirmed by pharmacy (4/25/2018  3:58 PM CDT)     9/18/2019 Luz Cedillo FNP  Visit date not found -nothing scheduled at this time           3. Partial symptomatic epilepsy with complex partial seizures, not intractable, without status epilepticus (H)  - Ambulatory referral to Neurology  - Hepatic Profile  - Phenytoin (Dilantin )  - HM2(CBC w/o Differential)

## 2021-06-02 VITALS — HEIGHT: 64 IN | WEIGHT: 125 LBS | BODY MASS INDEX: 21.34 KG/M2

## 2021-06-02 VITALS — BODY MASS INDEX: 22.78 KG/M2 | WEIGHT: 123.8 LBS | HEIGHT: 62 IN

## 2021-06-02 VITALS — WEIGHT: 125 LBS | BODY MASS INDEX: 22.86 KG/M2

## 2021-06-02 LAB — T PALLIDUM AB SER QL: NEGATIVE

## 2021-06-02 NOTE — TELEPHONE ENCOUNTER
----- Message from HAIR Salinas sent at 10/1/2019 11:07 AM CDT -----  Call patient: her DXA shows that she has low bone density with a high fracture risk using some of the information she provided. I recommend that she resume Reclast infusions or talk with one of our osteoporosis specialists about options for treatment to lower fracture risk. Let me know which she would prefer to do.
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
## 2021-06-02 NOTE — PROGRESS NOTES
Pt here for reclast.  Creat was 0.93. IV started and reclast given without any complications. IV flushed with NS and pt observed for 20 minutes without any issues. IV DC'd and site covered with guaze and coban. Pt given DC instructions and did not have any questions or concerns. Pt left stable with her friend.

## 2021-06-02 NOTE — PROGRESS NOTES
Dear Dr. Cedillo :  I had the pleasure of seeing Kimberly Hernandez for follow-up of her for aneurysms.  As you well aware Kimberly is undergone clipping of a cerebral artery aneurysm in 1977 with coil embolization of a right paraophthalmic artery aneurysm in 2018.  She has an unsecured aneurysm that broad-based about 2-1/2 mm.  She is continued to smoke.  She is here for follow-up of her aneurysms.    On physical exam patient is very pleasant and appropriate  Speech is clear fluent and appropriate  Face is symmetric throughout the forehead eyes and mouth  Extraocular muscles are intact bilaterally  Tongue and uvula elevate in the midline  Strength is full throughout the upper and lower extremities  Gait is nonantalgic    Assessment and plan:  Kimberly is a very pleasant 74-year-old status post clipping and coiling of 2 aneurysms and an unsecured aneurysms that we are monitoring.  I will obtain a CTA to evaluate her aneurysms and give her a call with results.   Thank you for giving me the opportunity to see this very pleasant patient.  If you have any questions about Kimberly or any other patients please feel free to contact me.  Of note I spent greater than 15 minutes counseling the patient regarding her pathology and treatment plan, greater than 50 percent of which was in direct counseling.    Darrell Marin MD, FAANS

## 2021-06-02 NOTE — TELEPHONE ENCOUNTER
Last Office Visit  19    Notes:  Assessment / Plan / Medical Decision Makin. Cerebral aneurysm, nonruptured- follow up with neurosurgery and MRA repeat 2019  2. Cerebral aneurysm  - Ambulatory referral to Neurosurgery     3. Partial symptomatic epilepsy with complex partial seizures, not intractable, without status epilepticus (H)  - Ambulatory referral to Neurology  - Hepatic Profile  - Phenytoin (Dilantin )  - HM2(CBC w/o Differential)    Last Filled:  19  Next OV:  Visit date not found      Called and LVM for pt to call the pharmacy - medication was sent on 19 with a refill

## 2021-06-02 NOTE — PATIENT INSTRUCTIONS - HE
Patient Education   Zoledronic Acid Solution for injection [Hypercalcemia of Malignancy]  What is this medicine?  ZOLEDRONIC ACID (TRISTAN eleanor jon ik AS id) lowers the amount of calcium loss from bone. It is used to treat too much calcium in your blood from cancer. It is also used to prevent complications of cancer that has spread to the bone.  This medicine may be used for other purposes; ask your health care provider or pharmacist if you have questions.  What should I tell my health care provider before I take this medicine?  They need to know if you have any of these conditions:    aspirin-sensitive asthma    cancer, especially if you are receiving medicines used to treat cancer    dental disease or wear dentures    infection    kidney disease    receiving corticosteroids like dexamethasone or prednisone    an unusual or allergic reaction to zoledronic acid, other medicines, foods, dyes, or preservatives    pregnant or trying to get pregnant    breast-feeding  How should I use this medicine?  This medicine is for infusion into a vein. It is given by a health care professional in a hospital or clinic setting.  Talk to your pediatrician regarding the use of this medicine in children. Special care may be needed.  Overdosage: If you think you have taken too much of this medicine contact a poison control center or emergency room at once.  NOTE: This medicine is only for you. Do not share this medicine with others.  What if I miss a dose?  It is important not to miss your dose. Call your doctor or health care professional if you are unable to keep an appointment.  What may interact with this medicine?    certain antibiotics given by injection    NSAIDs, medicines for pain and inflammation, like ibuprofen or naproxen    some diuretics like bumetanide, furosemide    teriparatide    thalidomide  This list may not describe all possible interactions. Give your health care provider a list of all the medicines, herbs,  non-prescription drugs, or dietary supplements you use. Also tell them if you smoke, drink alcohol, or use illegal drugs. Some items may interact with your medicine.  What should I watch for while using this medicine?  Visit your doctor or health care professional for regular checkups. It may be some time before you see the benefit from this medicine. Do not stop taking your medicine unless your doctor tells you to. Your doctor may order blood tests or other tests to see how you are doing.  Women should inform their doctor if they wish to become pregnant or think they might be pregnant. There is a potential for serious side effects to an unborn child. Talk to your health care professional or pharmacist for more information.  You should make sure that you get enough calcium and vitamin D while you are taking this medicine. Discuss the foods you eat and the vitamins you take with your health care professional.  Some people who take this medicine have severe bone, joint, and/or muscle pain. This medicine may also increase your risk for jaw problems or a broken thigh bone. Tell your doctor right away if you have severe pain in your jaw, bones, joints, or muscles. Tell your doctor if you have any pain that does not go away or that gets worse.  Tell your dentist and dental surgeon that you are taking this medicine. You should not have major dental surgery while on this medicine. See your dentist to have a dental exam and fix any dental problems before starting this medicine. Take good care of your teeth while on this medicine. Make sure you see your dentist for regular follow-up appointments.  What side effects may I notice from receiving this medicine?  Side effects that you should report to your doctor or health care professional as soon as possible:    allergic reactions like skin rash, itching or hives, swelling of the face, lips, or tongue    anxiety, confusion, or depression    breathing problems    changes in  vision    eye pain    feeling faint or lightheaded, falls    jaw pain, especially after dental work    mouth sores    muscle cramps, stiffness, or weakness    trouble passing urine or change in the amount of urine  Side effects that usually do not require medical attention (report to your doctor or health care professional if they continue or are bothersome):    bone, joint, or muscle pain    constipation    diarrhea    fever    hair loss    irritation at site where injected    loss of appetite    nausea, vomiting    stomach upset    trouble sleeping    trouble swallowing    weak or tired  This list may not describe all possible side effects. Call your doctor for medical advice about side effects. You may report side effects to FDA at 9-736-FYU-9743.  Where should I keep my medicine?  This drug is given in a hospital or clinic and will not be stored at home.  NOTE:This sheet is a summary. It may not cover all possible information. If you have questions about this medicine, talk to your doctor, pharmacist, or health care provider. Copyright  2015 Gold Standard         Patient Education   Zoledronic Acid Solution for injection [Hypercalcemia of Malignancy]  What is this medicine?  ZOLEDRONIC ACID (TRISTAN nguyen ik AS id) lowers the amount of calcium loss from bone. It is used to treat too much calcium in your blood from cancer. It is also used to prevent complications of cancer that has spread to the bone.  This medicine may be used for other purposes; ask your health care provider or pharmacist if you have questions.  What should I tell my health care provider before I take this medicine?  They need to know if you have any of these conditions:    aspirin-sensitive asthma    cancer, especially if you are receiving medicines used to treat cancer    dental disease or wear dentures    infection    kidney disease    receiving corticosteroids like dexamethasone or prednisone    an unusual or allergic reaction to zoledronic  acid, other medicines, foods, dyes, or preservatives    pregnant or trying to get pregnant    breast-feeding  How should I use this medicine?  This medicine is for infusion into a vein. It is given by a health care professional in a hospital or clinic setting.  Talk to your pediatrician regarding the use of this medicine in children. Special care may be needed.  Overdosage: If you think you have taken too much of this medicine contact a poison control center or emergency room at once.  NOTE: This medicine is only for you. Do not share this medicine with others.  What if I miss a dose?  It is important not to miss your dose. Call your doctor or health care professional if you are unable to keep an appointment.  What may interact with this medicine?    certain antibiotics given by injection    NSAIDs, medicines for pain and inflammation, like ibuprofen or naproxen    some diuretics like bumetanide, furosemide    teriparatide    thalidomide  This list may not describe all possible interactions. Give your health care provider a list of all the medicines, herbs, non-prescription drugs, or dietary supplements you use. Also tell them if you smoke, drink alcohol, or use illegal drugs. Some items may interact with your medicine.  What should I watch for while using this medicine?  Visit your doctor or health care professional for regular checkups. It may be some time before you see the benefit from this medicine. Do not stop taking your medicine unless your doctor tells you to. Your doctor may order blood tests or other tests to see how you are doing.  Women should inform their doctor if they wish to become pregnant or think they might be pregnant. There is a potential for serious side effects to an unborn child. Talk to your health care professional or pharmacist for more information.  You should make sure that you get enough calcium and vitamin D while you are taking this medicine. Discuss the foods you eat and the  vitamins you take with your health care professional.  Some people who take this medicine have severe bone, joint, and/or muscle pain. This medicine may also increase your risk for jaw problems or a broken thigh bone. Tell your doctor right away if you have severe pain in your jaw, bones, joints, or muscles. Tell your doctor if you have any pain that does not go away or that gets worse.  Tell your dentist and dental surgeon that you are taking this medicine. You should not have major dental surgery while on this medicine. See your dentist to have a dental exam and fix any dental problems before starting this medicine. Take good care of your teeth while on this medicine. Make sure you see your dentist for regular follow-up appointments.  What side effects may I notice from receiving this medicine?  Side effects that you should report to your doctor or health care professional as soon as possible:    allergic reactions like skin rash, itching or hives, swelling of the face, lips, or tongue    anxiety, confusion, or depression    breathing problems    changes in vision    eye pain    feeling faint or lightheaded, falls    jaw pain, especially after dental work    mouth sores    muscle cramps, stiffness, or weakness    trouble passing urine or change in the amount of urine  Side effects that usually do not require medical attention (report to your doctor or health care professional if they continue or are bothersome):    bone, joint, or muscle pain    constipation    diarrhea    fever    hair loss    irritation at site where injected    loss of appetite    nausea, vomiting    stomach upset    trouble sleeping    trouble swallowing    weak or tired  This list may not describe all possible side effects. Call your doctor for medical advice about side effects. You may report side effects to FDA at 1-409-FDA-8419.  Where should I keep my medicine?  This drug is given in a hospital or clinic and will not be stored at  home.  NOTE:This sheet is a summary. It may not cover all possible information. If you have questions about this medicine, talk to your doctor, pharmacist, or health care provider. Copyright  2015 Gold Standard

## 2021-06-02 NOTE — PROGRESS NOTES
Patient here for follow up on aneurysm.today she reports Dizzyiness at random, no triggers.  Julee Almazan, BRANDON, 12:22 PM

## 2021-06-02 NOTE — TELEPHONE ENCOUNTER
RN cannot approve Refill Request    RN can NOT refill this medication PCP messaged that patient is overdue for Labs. Last office visit: 9/18/2019 Luz Cedillo FNP Last Physical: 12/11/2018 Last MTM visit: Visit date not found Last visit same specialty: 9/18/2019 Luz Cedillo FNP.  Next visit within 3 mo: Visit date not found  Next physical within 3 mo: Visit date not found      Lyndsey Torrez, [facility] Triage/Med Refill 10/8/2019    Requested Prescriptions   Pending Prescriptions Disp Refills     phenytoin (DILANTIN) 100 MG ER capsule [Pharmacy Med Name: PHENYTOIN SODIUM 100MG EXT CAPSULES] 360 capsule 0     Sig: TAKE 2 CAPSULES BY MOUTH TWICE DAILY       Phenytoin Refill Protocol Failed - 10/7/2019  2:10 PM        Failed - Folate level in last 12 months     Folate   Date Value Ref Range Status   07/31/2018 12.0 >=3.5 ng/mL Final             Passed - LFT or AST or ALT in last 12 months     Albumin   Date Value Ref Range Status   09/18/2019 4.1 3.5 - 5.0 g/dL Final     Bilirubin, Total   Date Value Ref Range Status   09/18/2019 0.3 0.0 - 1.0 mg/dL Final     Bilirubin, Direct   Date Value Ref Range Status   09/18/2019 0.1 <=0.5 mg/dL Final     Alkaline Phosphatase   Date Value Ref Range Status   09/18/2019 118 45 - 120 U/L Final     AST   Date Value Ref Range Status   09/18/2019 20 0 - 40 U/L Final     ALT   Date Value Ref Range Status   09/18/2019 13 0 - 45 U/L Final     Protein, Total   Date Value Ref Range Status   09/18/2019 6.9 6.0 - 8.0 g/dL Final                Passed - CBC w/o diff (Hemogram 2) in last year      WBC   Date Value Ref Range Status   09/18/2019 6.0 4.0 - 11.0 thou/uL Final     RBC   Date Value Ref Range Status   09/18/2019 4.42 3.80 - 5.40 mill/uL Final     Hemoglobin   Date Value Ref Range Status   09/18/2019 15.1 12.0 - 16.0 g/dL Final     Hematocrit   Date Value Ref Range Status   09/18/2019 43.6 35.0 - 47.0 % Final     MCV   Date Value Ref Range Status   09/18/2019 99  80 - 100 fL Final     MCH   Date Value Ref Range Status   09/18/2019 34.2 (H) 27.0 - 34.0 pg Final     MCHC   Date Value Ref Range Status   09/18/2019 34.7 32.0 - 36.0 g/dL Final     RDW   Date Value Ref Range Status   09/18/2019 11.2 11.0 - 14.5 % Final     Platelets   Date Value Ref Range Status   09/18/2019 273 140 - 440 thou/uL Final     MPV   Date Value Ref Range Status   09/18/2019 7.2 7.0 - 10.0 fL Final                Passed - Free phenytoin level in last 12 months     Phenytoin, Free   Date Value Ref Range Status   12/12/2018 2.5 (H) 1.0 - 2.0 ug/mL Final             Passed - PCP or prescribing provider visit in past 12 months       Last office visit with prescriber/PCP: 9/18/2019 Luz Cedillo FNP OR same dept: 9/18/2019 Luz Cedillo FNP OR same specialty: 9/18/2019 Luz Cedillo FNP  Last physical: 12/11/2018 Last MTM visit: Visit date not found   Next visit within 3 mo: Visit date not found  Next physical within 3 mo: Visit date not found  Prescriber OR PCP: HAIR Monteiro  Last diagnosis associated with med order: 1. Epilepsy (H)  - phenytoin (DILANTIN) 100 MG ER capsule [Pharmacy Med Name: PHENYTOIN SODIUM 100MG EXT CAPSULES]; TAKE 2 CAPSULES BY MOUTH TWICE DAILY  Dispense: 360 capsule; Refill: 0    If protocol passes may refill for 12 months if within 3 months of last provider visit (or a total of 15 months).

## 2021-06-03 ENCOUNTER — RECORDS - HEALTHEAST (OUTPATIENT)
Dept: ADMINISTRATIVE | Facility: CLINIC | Age: 76
End: 2021-06-03

## 2021-06-03 ENCOUNTER — COMMUNICATION - HEALTHEAST (OUTPATIENT)
Dept: INTERNAL MEDICINE | Facility: CLINIC | Age: 76
End: 2021-06-03

## 2021-06-03 VITALS
HEART RATE: 68 BPM | OXYGEN SATURATION: 99 % | SYSTOLIC BLOOD PRESSURE: 177 MMHG | WEIGHT: 123 LBS | DIASTOLIC BLOOD PRESSURE: 62 MMHG | BODY MASS INDEX: 22.63 KG/M2 | HEIGHT: 62 IN

## 2021-06-03 VITALS
BODY MASS INDEX: 22.82 KG/M2 | DIASTOLIC BLOOD PRESSURE: 60 MMHG | WEIGHT: 124 LBS | HEART RATE: 80 BPM | HEIGHT: 62 IN | SYSTOLIC BLOOD PRESSURE: 130 MMHG

## 2021-06-03 VITALS
HEART RATE: 64 BPM | SYSTOLIC BLOOD PRESSURE: 112 MMHG | DIASTOLIC BLOOD PRESSURE: 60 MMHG | WEIGHT: 123 LBS | BODY MASS INDEX: 22.5 KG/M2

## 2021-06-04 VITALS
SYSTOLIC BLOOD PRESSURE: 120 MMHG | DIASTOLIC BLOOD PRESSURE: 60 MMHG | HEART RATE: 64 BPM | BODY MASS INDEX: 23.92 KG/M2 | WEIGHT: 130 LBS | HEIGHT: 62 IN

## 2021-06-04 VITALS
HEIGHT: 63 IN | DIASTOLIC BLOOD PRESSURE: 64 MMHG | SYSTOLIC BLOOD PRESSURE: 136 MMHG | RESPIRATION RATE: 20 BRPM | BODY MASS INDEX: 21.79 KG/M2 | OXYGEN SATURATION: 99 % | TEMPERATURE: 97.1 F | HEART RATE: 71 BPM | WEIGHT: 123 LBS

## 2021-06-04 VITALS — HEIGHT: 63 IN | BODY MASS INDEX: 22.87 KG/M2 | WEIGHT: 129.06 LBS

## 2021-06-04 NOTE — TELEPHONE ENCOUNTER
Refill Approved    Rx renewed per Medication Renewal Policy. Medication was last renewed on 4/27/19.    Janice Serrano, [facility] Triage/Med Refill 12/4/2019     Requested Prescriptions   Pending Prescriptions Disp Refills     atorvastatin (LIPITOR) 40 MG tablet [Pharmacy Med Name: Atorvastatin Calcium Oral Tablet 40 MG] 90 tablet 2     Sig: Take 1 tablet (40 mg total) by mouth at bedtime.       Statins Refill Protocol (Hmg CoA Reductase Inhibitors) Passed - 12/3/2019 12:17 PM        Passed - PCP or prescribing provider visit in past 12 months      Last office visit with prescriber/PCP: 10/9/2019 Luz Cedillo FNP OR same dept: 10/9/2019 Luz Cedillo FNP OR same specialty: 10/9/2019 uLz Cedillo FNP  Last physical: 12/11/2018 Last MTM visit: Visit date not found   Next visit within 3 mo: Visit date not found  Next physical within 3 mo: Visit date not found  Prescriber OR PCP: HAIR Monteiro  Last diagnosis associated with med order: 1. Cerebral aneurysm, nonruptured- follow up with neurosurgery and MRA repeat 12/2019    2. Mixed hyperlipidemia  - atorvastatin (LIPITOR) 40 MG tablet [Pharmacy Med Name: Atorvastatin Calcium Oral Tablet 40 MG]; Take 1 tablet (40 mg total) by mouth at bedtime.  Dispense: 90 tablet; Refill: 2    If protocol passes may refill for 12 months if within 3 months of last provider visit (or a total of 15 months).

## 2021-06-05 VITALS
OXYGEN SATURATION: 96 % | BODY MASS INDEX: 20.62 KG/M2 | WEIGHT: 120.8 LBS | SYSTOLIC BLOOD PRESSURE: 150 MMHG | DIASTOLIC BLOOD PRESSURE: 82 MMHG | HEART RATE: 70 BPM | TEMPERATURE: 97 F | HEIGHT: 64 IN

## 2021-06-05 NOTE — TELEPHONE ENCOUNTER
RN cannot approve Refill Request    RN can NOT refill this medication PCP messaged that patient is overdue for Labs. Last office visit: 10/9/2019 Luz Cedillo FNP Last Physical: 12/11/2018 Last MTM visit: Visit date not found Last visit same specialty: 10/9/2019 Luz Cedillo FNP.  Next visit within 3 mo: Visit date not found  Next physical within 3 mo: Visit date not found      [facility] Triage/Med Refill 1/6/2020    Requested Prescriptions   Pending Prescriptions Disp Refills     phenytoin (DILANTIN) 100 MG ER capsule [Pharmacy Med Name: PHENYTOIN SODIUM 100MG EXT CAPSULES] 360 capsule 0     Sig: TAKE 2 CAPSULES BY MOUTH TWICE DAILY       Phenytoin Refill Protocol Failed - 1/6/2020  1:45 PM        Failed - Free phenytoin level in last 12 months     Phenytoin, Free   Date Value Ref Range Status   12/12/2018 2.5 (H) 1.0 - 2.0 ug/mL Final             Failed - Folate level in last 12 months     Folate   Date Value Ref Range Status   07/31/2018 12.0 >=3.5 ng/mL Final             Passed - LFT or AST or ALT in last 12 months     Albumin   Date Value Ref Range Status   09/18/2019 4.1 3.5 - 5.0 g/dL Final     Bilirubin, Total   Date Value Ref Range Status   09/18/2019 0.3 0.0 - 1.0 mg/dL Final     Bilirubin, Direct   Date Value Ref Range Status   09/18/2019 0.1 <=0.5 mg/dL Final     Alkaline Phosphatase   Date Value Ref Range Status   09/18/2019 118 45 - 120 U/L Final     AST   Date Value Ref Range Status   09/18/2019 20 0 - 40 U/L Final     ALT   Date Value Ref Range Status   09/18/2019 13 0 - 45 U/L Final     Protein, Total   Date Value Ref Range Status   09/18/2019 6.9 6.0 - 8.0 g/dL Final                Passed - CBC w/o diff (Hemogram 2) in last year      WBC   Date Value Ref Range Status   09/18/2019 6.0 4.0 - 11.0 thou/uL Final     RBC   Date Value Ref Range Status   09/18/2019 4.42 3.80 - 5.40 mill/uL Final     Hemoglobin   Date Value Ref Range Status   09/18/2019 15.1 12.0 -  16.0 g/dL Final     Hematocrit   Date Value Ref Range Status   09/18/2019 43.6 35.0 - 47.0 % Final     MCV   Date Value Ref Range Status   09/18/2019 99 80 - 100 fL Final     MCH   Date Value Ref Range Status   09/18/2019 34.2 (H) 27.0 - 34.0 pg Final     MCHC   Date Value Ref Range Status   09/18/2019 34.7 32.0 - 36.0 g/dL Final     RDW   Date Value Ref Range Status   09/18/2019 11.2 11.0 - 14.5 % Final     Platelets   Date Value Ref Range Status   09/18/2019 273 140 - 440 thou/uL Final     MPV   Date Value Ref Range Status   09/18/2019 7.2 7.0 - 10.0 fL Final                Passed - PCP or prescribing provider visit in past 12 months       Last office visit with prescriber/PCP: 10/9/2019 Luz Cedillo FNP OR same dept: 10/9/2019 Luz Cedillo FNP OR same specialty: 10/9/2019 Luz Cedillo FNP  Last physical: 12/11/2018 Last MTM visit: Visit date not found   Next visit within 3 mo: Visit date not found  Next physical within 3 mo: Visit date not found  Prescriber OR PCP: HAIR Monteiro  Last diagnosis associated with med order: 1. Epilepsy (H)  - phenytoin (DILANTIN) 100 MG ER capsule [Pharmacy Med Name: PHENYTOIN SODIUM 100MG EXT CAPSULES]; TAKE 2 CAPSULES BY MOUTH TWICE DAILY  Dispense: 360 capsule; Refill: 0    If protocol passes may refill for 12 months if within 3 months of last provider visit (or a total of 15 months).

## 2021-06-06 NOTE — TELEPHONE ENCOUNTER
Called and spoke with Kimberly, discussed her stable CTA scan in details to her satisfaction. Per Dr. Marin, ordered CTA in 1 year with CC.  Shayy Jarrell, RN, CNRN

## 2021-06-06 NOTE — TELEPHONE ENCOUNTER
Patient called to let us know she is unable to have a MRI/MRA due to possible plate and aneurysm clips from 1977 aneurysm surgery. Patient believes the hardware is not made of titanium. It appears Dr. Marin order CTA which was completed. Then a Valentina Self CNP (from [facility]?) ordered the MRA COW w/o.    Please advise if there is further imaging Dr. Marin would like completed and when/if patient should RTC.
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
## 2021-06-06 NOTE — TELEPHONE ENCOUNTER
Call patient: Your cholesterol is elevated and I recommend that you take a statin cholesterol medication for this. I would like to have you start taking atorvastatin 40 mg daily which I believe you took previously. I sent this to your pharmacy.The electrolyte panel and kidney function labs are normal.

## 2021-06-06 NOTE — PROCEDURES
[facility] Post Procedure    Procedure: Cerebral angiogram with 3DRA     Radiologist: Nicolas Menezes    Contrast: 25 ml omnipaque; 20 ml visipaque   Fluoro Time: 2.6 minutes  Air Kerma: 585 mGy    Medications:  Versed 1 mg IV                         Fentanyl 50 mcg IV    Hydralazine 10 mg IV     Sedation Time: 30 minutes    EBL: minimal  Complications: none    Preliminary findings: (see dictation for full detail)  Stable coil embolizatino right paraophthalmic aneurysm  Stable additional small untreated right ICA aneurysms     Assess/Plan:   Routine post procedure monitoring  Bedrest for 4 hours   Discharge when meets criteria   Follow-up pending further discussion with Dr. Roxy Menezes

## 2021-06-06 NOTE — PRE-PROCEDURE
Written consent obtained?: Yes  Risks and benefits: Risks, benefits and alternatives were discussed  Discharge plan: Appropriate discharge home plan in place for patients who are going home after procedure   Consent given by: patient  Expected level of sedation: moderate  ASA Class: Class 2- mild systemic disease, no acute problems, no functional limitations  Mallampati: Grade 2- soft palate, base of uvula, tonsillar pillars, and portion of posterior pharyngeal wall visible  Patient states understanding of procedure being performed: Yes  Patient's understanding of procedure matches consent: Yes  Procedure consent matches procedure scheduled: Yes  Appropriately NPO: yes  Lungs: lungs clear with good breath sounds bilaterally  Heart: normal heart sounds and rate  History & Physical reviewed: History and physical reviewed and no updates needed  Statement of review: I have reviewed the lab findings, diagnostic data, medications, and the plan for sedation

## 2021-06-10 NOTE — PROGRESS NOTES
Assessment/Plan:        1. Partial symptomatic epilepsy with complex partial seizures, not intractable, without status epilepticus (H)  Check lab:   - Phenytoin (Dilantin )- Mildly elevated level.  However, may continue on the current dosage.     - Comprehensive Metabolic Panel- normal   - HM1(CBC and Differential)  - HM1 (CBC with Diff)    2. Seborrheic keratoses  Exam findings were discussed and assurance given.   Routine skin exam advised.       3. Pain of finger of right hand  Exam findings were discussed   May consider an X-ray, but declined by patient at this time.   Will continue with the splint.     4. Need for shingles vaccine  - Varicella Zoster, Recombinant Vaccine IM           At the conclusion of the encounter the plan of care, disposition and all questions were answered and reviewed, and the patient acknowledged understanding and was involved in the decision making regarding the overall care plan.     Patient Care Team:  Luz Cedillo FNP as PCP - General (Nurse Practitioner)  Ronnie Pruitt MD as Physician (Neurology)  Luz Cedillo FNP as Assigned PCP    45  minutes spent on this visit with more than 50% time spent on  reviewing medical record, education, providing supportive therapy regarding coping with chronic illness, entering orders, reviewing and commenting on the test results, and preparing documentation for the visit           Subjective:    Patient ID:   Kimberly Hernandez is a 75 y.o. female presenting with the following concerns:     1- history of  Seizures and on Dilantin, needs the level checked.   She notes to being seizure free for the past several yrs.     2-  She injured her right 4th digit on 8/14/20, falling and been wearing a homemade splint for protection.  She notes to still having pain.      3- having multiple dark brown lesion on torso for number of years.       Review of Systems  Allergy: reviewed  General : negative  A complete 5 point review of systems was  "obtained and is negative other than what is stated in the HPI.      The following patient's history were reviewed and updated as appropriate:   She  has a past medical history of Alcohol abuse, Back Strain, Cerebral aneurysm, Cognitive decline (1970), Epilepsy And Recurrent Seizures, Hemorrhoids, Hyperlipidemia, Localized pain of knee joint, Osteoarthritis, knee, Osteopenia, Osteoporosis, Serum Enzyme Levels - Alkaline Phosphatase Elevated, and Vitamin D deficiency..      Outpatient Encounter Medications as of 8/21/2020   Medication Sig Dispense Refill     artificial tears,hypromellose, (PURE & GENTLE) 0.3 % Drop ophthalmic drops Administer 2 drops to both eyes 4 (four) times a day as needed. 30 mL 11     aspirin 81 MG EC tablet Take half tab daily prn       calcium carbonate (OS-EULALIA) 600 mg (1,500 mg) tablet Take 600 mg by mouth 2 (two) times a day with meals.       ERGOCALCIFEROL, VITAMIN D2, (VITAMIN D2 ORAL) Take 2 tablets by mouth daily.       multivitamin therapeutic (THERAGRAN) tablet Take 1 tablet by mouth daily.       phenytoin (DILANTIN) 100 MG ER capsule TAKE 2 CAPSULES BY MOUTH TWICE DAILY 360 capsule 1     No facility-administered encounter medications on file as of 8/21/2020.          Objective:   /64 (Patient Site: Right Arm, Patient Position: Sitting, Cuff Size: Adult Regular)   Pulse 71   Temp 97.1  F (36.2  C) (Tympanic)   Resp 20   Ht 5' 3\" (1.6 m)   Wt 123 lb (55.8 kg)   SpO2 99%   BMI 21.79 kg/m        Physical Exam  General Appearance:    Alert, well hydrated, no distress   Throat:   mucous membranes moist, pharynx normal without lesions   Neck:   Supple, symmetrical, trachea midline, no adenopathy;     thyroid:  no enlargement/tenderness/nodules;    Lungs:     clear to auscultation, no wheezes, rales or rhonchi, symmetric air entry     Heart:    Regular rate and rhythm, S1 and S2 normal, no murmur, rub   or gallop, no edema    MSK/Skin:   Right 4th digit, normal to inspection , " palpable tenderness to the PIP, no swelling ,   Multiple Seb K's, no other rashes or lesions

## 2021-06-11 NOTE — PROGRESS NOTES
Internal Medicine Office Visit  Patient Name: Kimberly Hernandez  Patient Age: 72 y.o.  YOB: 1945  MRN: [id number]  ?  Date of Visit: 2017  Reason for Office Visit:   Chief Complaint   Patient presents with     Fall     hit head and Rt hip, happened 2 days ago       Assessment / Plan / Medical Decision Makin. Right hip pain  2. Syncope and collapse  3. Epilepsy  - With history of seizure disorder and new episode of syncope and collapse without any investigation recently, recommend that she establish care with neurology which she is agreeable to today.  Will also check basic lab results and assure that there are no electrolyte abnormalities. EKG today shows NSR with PACs.   - Regarding hip pain, xray personally reviewed. There are no acute findings today   - Referral to neurology given her history of seizure disorder and new episode of syncope and collapse     Health Maintenance Review  Health Maintenance   Topic Date Due     FALL RISK ASSESSMENT  10/25/2017     MAMMOGRAM  10/27/2018     DXA SCAN  2018     ADVANCE DIRECTIVES DISCUSSED WITH PATIENT  10/25/2021     TD 18+ HE  07/15/2024     COLONOSCOPY  10/25/2026     PNEUMOCOCCAL POLYSACCHARIDE VACCINE AGE 65 AND OVER  Completed     INFLUENZA VACCINE RULE BASED  Completed     PNEUMOCOCCAL CONJUGATE VACCINE FOR ADULTS (PCV13 OR PREVNAR)  Completed     ZOSTER VACCINE  Completed       I have discontinued Ms. Hernandez's atorvastatin. I am also having her start on cyclobenzaprine. Additionally, I am having her maintain her multivitamin therapeutic, calcium carbonate, (ERGOCALCIFEROL, VITAMIN D2, (VITAMIN D2 ORAL)), aspirin, simvastatin, phenytoin, and triamcinolone.     HPI:   Encounter Diagnoses   Name Primary?     Right hip pain Yes     Syncope and collapse      Epilepsy       Two days ago, was on an [place] cruise and went up some bus steps. On the third step she thinks she blacked out, she had been feeling a little lightheaded for a  couple minutes prior to climbing the steps. She fell backwards and hit the steps of the bus with the right hip and fell out of the bus onto the concrete. She awoke to people standing over her and asking if she was okay. She stood up with some help and was able to walk without pain at first. She doesn't recall that she felt dizzy right after she came to and stood back up. She doesn't think it was a seizure, no incontinence or fatigue after the event. No chest pain or SOA. She has never had something like this happen before. Since the fall she has had hip pain but denies any lightheadedness/dizziness/presyncope.     Right hip pain- very sore since the fall. Has had a difficult time sleeping. Pain radiates into her back. She is able to ambulate with full weight bearing but this is somewhat painful.     Tobacco- smoking 1/2 ppd; has cut back but not interested in stopping altogether.     Review of Systems: Pertinent findings are as stated in HPI, otherwise a 10-point ROS is negative     Current Scheduled Meds:  Outpatient Encounter Prescriptions as of 6/8/2017   Medication Sig Dispense Refill     aspirin 81 MG EC tablet Take half tab daily prn       calcium carbonate (OS-EULALIA) 600 mg (1,500 mg) tablet Take 600 mg by mouth 2 (two) times a day with meals.       ERGOCALCIFEROL, VITAMIN D2, (VITAMIN D2 ORAL) Take 2 tablets by mouth daily.       multivitamin therapeutic (THERAGRAN) tablet Take 1 tablet by mouth daily.       phenytoin (DILANTIN) 100 MG ER capsule TAKE TWO CAPSULES BY MOUTH TWICE DAILY  360 capsule 1     simvastatin (ZOCOR) 40 MG tablet TAKE 1 TABLET BY MOUTH AT BEDTIME FOR CHOLESTEROL 90 tablet 1     triamcinolone (KENALOG) 0.1 % cream Apply a thin coat of cream to affected areas three times daily. 30 g 0     [DISCONTINUED] atorvastatin (LIPITOR) 40 MG tablet Take 1 tablet (40 mg total) by mouth bedtime. 90 tablet 3     cyclobenzaprine (FLEXERIL) 5 MG tablet Take 1 tablet (5 mg total) by mouth 2 (two) times a  day as needed for muscle spasms. 30 tablet 0     No facility-administered encounter medications on file as of 6/8/2017.      Past Medical History:   Diagnosis Date     Alcohol abuse      Back Strain     Created by Conversion      Epilepsy And Recurrent Seizures     Created by Conversion  Replacement Utility updated for latest IMO load     Hemorrhoids     Created by Conversion  Replacement Utility updated for latest IMO load     Hyperlipidemia     Created by Conversion      Osteoporosis     Created by Conversion  Replacement Utility updated for latest IMO load     Serum Enzyme Levels - Alkaline Phosphatase Elevated     Created by Conversion [facility] Annotation: Nov 26 2007 12:54PM - Katerin Beverly: Fred  10/05: nl;  Replacement Utility updated for latest IMO load     Past Surgical History:   Procedure Laterality Date     MA REMOVE TONSILS/ADENOIDS,<13 Y/O      Description: Tonsillectomy With Adenoidectomy;  Recorded: 11/26/2007;     MA VAG HYST, W/VAGINECTOMY      Description: Vaginal Hysterectomy With Colpectomy;  Recorded: 11/26/2007;     Social History   Substance Use Topics     Smoking status: Former Smoker     Packs/day: 1.00     Types: Cigarettes     Quit date: 10/29/2016     Smokeless tobacco: None     Alcohol use Yes      Comment: recovered alcoholic x 32 years        Objective / Physical Examination:  Vitals:    06/08/17 1457   BP: 118/70   Pulse: 61     Wt Readings from Last 3 Encounters:   02/23/17 152 lb 11.2 oz (69.3 kg)   12/08/16 130 lb (59 kg)   09/29/16 144 lb (65.3 kg)     There is no height or weight on file to calculate BMI.     General Appearance: Alert and oriented, cooperative, affect appropriate, speech clear, in no apparent distress  Eyes: PERRL, EOMs intact   Neck: Supple, trachea midline. No carotid bruits   Lungs: Clear to auscultation bilaterally. Normal inspiratory and expiratory effort  Cardiovascular: Regular rate, normal S1, S2. No murmurs, rubs, or gallops  MSK: Negative Rai's  test. Full hip ROM without pain. Pain exacerbated by lying flat on her back and is in the SI joint region. Normal gait when ambulating   Extremities: No edema  Integumentary: Low back tiny area of ecchymosis noted over L5-S1 paraspinal area slightly to the right of midline       Orders Placed This Encounter   Procedures     XR Pelvis W 2 Vw Hip Right     Comprehensive Metabolic Panel     HM2(CBC w/o Differential)     Phenytoin (Dilantin )     Ambulatory referral to Neurology     Electrocardiogram Perform and Read   Followup: Return in about 4 weeks (around 7/6/2017) for Recheck. earlier if needed.         Luz Cedillo, CNP  [facility]

## 2021-06-12 NOTE — TELEPHONE ENCOUNTER
Call patient: she is due for follow-up in the office and lab work. Please schedule for further refills.

## 2021-06-12 NOTE — TELEPHONE ENCOUNTER
RN cannot approve Refill Request    RN can NOT refill this medication Protocol failed and NO refill given. Last office visit: 10/9/2019 Luz Cedillo FNP Last Physical: 3/11/2020 Last MTM visit: Visit date not found Last visit same specialty: 10/9/2019 Luz Cedillo FNP.  Next visit within 3 mo: Visit date not found  Next physical within 3 mo: Visit date not found      Stephanie Jacinto, [facility] Triage/Med Refill 11/4/2020    Requested Prescriptions   Pending Prescriptions Disp Refills     phenytoin (DILANTIN) 100 MG ER capsule 360 capsule 1     Sig: TAKE 2 CAPSULES BY MOUTH TWICE DAILY       Phenytoin Refill Protocol Failed - 11/2/2020 10:32 AM        Failed - Free phenytoin level in last 12 months     Phenytoin, Free   Date Value Ref Range Status   12/12/2018 2.5 (H) 1.0 - 2.0 ug/mL Final             Failed - Folate level in last 12 months     Folate   Date Value Ref Range Status   07/31/2018 12.0 >=3.5 ng/mL Final             Passed - LFT or AST or ALT in last 12 months     Albumin   Date Value Ref Range Status   08/21/2020 4.0 3.5 - 5.0 g/dL Final     Bilirubin, Total   Date Value Ref Range Status   08/21/2020 0.3 0.0 - 1.0 mg/dL Final     Bilirubin, Direct   Date Value Ref Range Status   09/18/2019 0.1 <=0.5 mg/dL Final     Alkaline Phosphatase   Date Value Ref Range Status   08/21/2020 116 45 - 120 U/L Final     AST   Date Value Ref Range Status   08/21/2020 19 0 - 40 U/L Final     ALT   Date Value Ref Range Status   08/21/2020 24 0 - 45 U/L Final     Protein, Total   Date Value Ref Range Status   08/21/2020 6.8 6.0 - 8.0 g/dL Final                Passed - CBC w/o diff (Hemogram 2) in last year      WBC   Date Value Ref Range Status   08/21/2020 5.8 4.0 - 11.0 thou/uL Final     RBC   Date Value Ref Range Status   08/21/2020 4.42 3.80 - 5.40 mill/uL Final     Hemoglobin   Date Value Ref Range Status   08/21/2020 15.3 12.0 - 16.0 g/dL Final     Hematocrit   Date Value Ref Range Status    08/21/2020 44.3 35.0 - 47.0 % Final     MCV   Date Value Ref Range Status   08/21/2020 100 80 - 100 fL Final     MCH   Date Value Ref Range Status   08/21/2020 34.6 (H) 27.0 - 34.0 pg Final     MCHC   Date Value Ref Range Status   08/21/2020 34.5 32.0 - 36.0 g/dL Final     RDW   Date Value Ref Range Status   08/21/2020 10.6 (L) 11.0 - 14.5 % Final     Platelets   Date Value Ref Range Status   08/21/2020 225 140 - 440 thou/uL Final     MPV   Date Value Ref Range Status   08/21/2020 7.2 7.0 - 10.0 fL Final                Passed - PCP or prescribing provider visit in past 12 months       Last office visit with prescriber/PCP: 10/9/2019 Luz Cedillo FNP OR same dept: Visit date not found OR same specialty: 10/9/2019 Luz Cedillo FNP  Last physical: 3/11/2020 Last MTM visit: Visit date not found   Next visit within 3 mo: Visit date not found  Next physical within 3 mo: Visit date not found  Prescriber OR PCP: HAIR Monteiro  Last diagnosis associated with med order: 1. Epilepsy (H)  - phenytoin (DILANTIN) 100 MG ER capsule; TAKE 2 CAPSULES BY MOUTH TWICE DAILY  Dispense: 360 capsule; Refill: 1    If protocol passes may refill for 12 months if within 3 months of last provider visit (or a total of 15 months).

## 2021-06-12 NOTE — PROGRESS NOTES
Assessment / Plan:     Diagnoses and all orders for this visit:    Lumbalgia  -     Ambulatory referral to Physical Therapy    Myofascial pain  -     Ambulatory referral to Physical Therapy    Sleep disturbance  -     Ambulatory referral to Physical Therapy    Facet arthropathy, lumbar  -     Ambulatory referral to Physical Therapy          Kimberly Hernandez is a 72 y.o. y.o. female with past medical history significant for osteoporosis, hyperlipidemia, vitamin D deficiency, who presents today for follow-up regarding bilateral low back pain.  CT scan of the lumbar spine done on August 8 of 2017 shows no fracture or dislocation.  There is mild left convexity of the lumbar curvature, and multilevel degenerative disc disease with moderate facet arthropathy at the L5-S1.  Patient symptoms are likely myofascial in nature.  I recommend patient to start on physical therapy, and continue on diclofenac 50 mg 1 tablet twice daily, and tizanidine 4 mg at nighttime for muscle spasm.  No red flags.    A shared decision making plan was used.  The patient's values and choices were respected.  The following represents what was discussed and decided upon by the physician and the patient.      1.  DIAGNOSTIC TESTS: I reviewed the lumbar MRI imaging and the report with the patient today.  He verbalizes understanding.  2.  PHYSICAL THERAPY: Patient will continue on physical therapy [facility] program.  3.  MEDICATIONS: Patient will continue on diclofenac 50 mg 1 tablet twice daily, tizanidine 4 mg at nighttime.  Side effects of the medication discussed with the patient today.  4.  INTERVENTIONS: We will consider therapeutic facet steroid injection if she feels physical therapy and medication.  5.  PATIENT EDUCATION: I thoroughly discussed the plan with the patient today.  She verbalizes understanding and agrees with the plan.  6.  FOLLOW-UP: Patient will follow up with me in 5  weeks.  All questions were answered.      VIJAY Sims,  OS[place]      Subjective:     Kimberly Hernandez is a 72 y.o. female who presents today for follow-up regarding bilateral low back pain.  Today patient returns to the clinic reporting mild improvement in her symptoms.  CT of the lumbar spine shows no fracture or dislocation.  There is mild convexity of the lumbar curvature.  It shows no high-grade today patient has been taken diclofenac canal or foraminal stenosis.  There is mild degenerative disc disease and moderate facet arthropathy at the L5-S1.  She has been taking diclofenac 50 mg 1 tablet twice daily, tizanidine from time and feels that medication are helping.Patient denies urinary or bowel incontinence, unintentional weight loss, saddle anesthesia, fever or chills, balance difficulties.    Review of Systems:  Bilateral low back pain. Patient denies urinary or bowel incontinence, unintentional weight loss, saddle anesthesia, fever or chills, balance difficulties.       Objective:   CONSTITUTIONAL:  Vital signs as above.  No acute distress.  The patient is well nourished and well groomed.    PSYCHIATRIC:  The patient is awake, alert, oriented to person, place and time.  The patient is answering questions appropriately with clear speech.  Normal affect.  SKIN:  Skin over the face, posterior torso, bilateral upper and lower extremities is clean, dry, intact without rashes.  MUSCULOSKELETAL:  Gait is non-antalgic.  The patient is able to heel and toe walk without any difficulty.  Mild tenderness over the L4-L5, L5-S1 lumbar paraspinal muscles.      The patient has 5/5 strength for the bilateral hip flexors, knee flexors/extensors, ankle dorsiflexors/plantar flexors, ankle evertors/invertors.    NEUROLOGICAL:  2/4 patellar, medial hamstring, achilles reflexes which are symmetric bilaterally.  No ankle clonus bilaterally.  Sensation to light touch is intact in the bilateral L4, L5, and S1 dermatomes.     Negative straight leg raise bilaterally.  Negative hip  impingement and Rai test bilaterally.  RESULTS:    [facility]  CT LUMBAR SPINE WO CONTRAST  8/8/2017 2:11 PM     INDICATION: Lumbalgia, history of fall.  TECHNIQUE: Helical thin-section CT scan of the lumbar spine was performed using bone reconstruction algorithm. From the axial source data, sagittal and coronal thin reformats. Dose reduction techniques were used.   IV CONTRAST: None.  COMPARISON: None.     FINDINGS: Five lumbar-type vertebral bodies. Mild left convexity lumbar curvature. Subtle retrolisthesis of L1 on L2. Alignment is otherwise normal. 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 No acute fracture. No high-grade canal or   foraminal narrowing. Mild loss of disc space height L3-L4 through L5-S1. Moderate L5-S1 facet arthropathy. The visualized soft tissues of the abdomen demonstrate vascular calcifications though are otherwise grossly normal.     IMPRESSION:   CONCLUSION:  1.  No acute lumbar spine fracture.     2.  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     3.  Mild left convexity lumbar curvature.     4.  No high-grade canal or foraminal narrowing.     5.  Mild multilevel degenerative disc disease and moderate L5-S1 facet arthropathy.

## 2021-06-12 NOTE — PROGRESS NOTES
ASSESSMENT/PLAN:   1. Back pain  predniSONE (DELTASONE) 20 MG tablet    Ambulatory referral to Spine Care    CANCELED: XR Lumbar Spine 4 or More VWS    CANCELED: XR Thoracic Spine 2 VWS       Pain seems musculoskeletal in nature. Back pain is diffuse and nonfocal. There are no red flag symptoms to suggest alternative etiology of pain including AAA, aortic dissection, ACS, kidney stone, or an acute abdomen.   No signs on exam of emergent musculoskeletal condition such as cauda equina, significant nerve root compression. No focal spinal bony tenderness to suggest compression fracture, however I did initially order xrays as patient did clinically appear to be walking with torso tilted to the left and she is older with known osteoporosis, thus compression fracture is certainly within the differential. Patient however did not want to have xrays done tonight. I discussed with her that we are an urgent care and cannot place future orders to follow up on. She understood. I think at this point, regardless she needs to see Spine, thus I louise place referral today and perhaps they will jump right to advanced imaging.  Prescribed prednisone for any muscular component of pain and inflammation. Also discussed continued use of Tylenol for pain. Do not think muscle relaxer is indicated as I did not appreciate any significant muscle spasm on exam, and again, pain is more diffuse.   At the end of the encounter, I discussed medication and plan. Discussed red flags for immediate return to clinic/ER. Patient understood and agreed to plan. Patient was stable for discharge.      Patient Instructions:  Patient Instructions   The cause for your back pain is unclear today. You need imaging to determine the cause. I have referred you to the Spine clinic for further evaluation. Please await a phone call from our referral specialists to help you schedule an appointment within 1 week.    I have prescribed you a short course of Prednisone to help  "decrease the inflammation in the back.    Do not take ibuprofen while on the steroid. You MAY take Tylenol up to 1000mg, 4 times daily. Do not exceed this amount.    Try using a heating pad and/or warm baths.    Make sure to keep moving to avoid getting stiff. See below for stretching exercises.    If you develop fever, severe pain that prevents you from walking at all, weakness of your arms or legs, loss of bowel or bladder continence, or any other new concerning symptoms, go to the ER immediately.    Otherwise, follow up with Spine clinic in 1 week.                          SUBJECTIVE:   Kimberly eHrnandez is a 72 y.o. female who presents today for evaluation of back pain x 3 weeks. Pain is located \"all up and down her spine.\"  It is getting worse- she had to leave work early due to pain. Pain is made worse with lifting. 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 No difficulty walking, but her friend says she is walking \"tilted to one side.\" No leg pain or weakness or numbness. She was on a cruise 2 months ago and did have a fall straight onto the back of her head and back at that time. She did not have back pain immediately after that. No falls since then. No changes in activities recently. No saddle paresthesias. No incontinence or trouble urinating or defecating. No abdominal pain, nausea, vomiting, dysuria, hematuria. No chest pain, shortness of breath. No fever. No history of back pain or surgeries.   She has been taking Tylenol for pain, which does not help much.      Past Medical History:  Patient Active Problem List   Diagnosis     Osteoporosis     Hyperlipidemia     Epilepsy And Recurrent Seizures     Vitamin D Deficiency     Osteoarthritis Of The Knee     Joint Pain, Localized In The Knee     Elevated alkaline phosphatase measurement     Osteopenia       Surgical History:  Past Surgical History:   Procedure Laterality Date     NC REMOVE TONSILS/ADENOIDS,<11 Y/O      Description: " Tonsillectomy With Adenoidectomy;  Recorded: 11/26/2007;     MA VAG HYST, W/VAGINECTOMY      Description: Vaginal Hysterectomy With Colpectomy;  Recorded: 11/26/2007;           Family History:  Family History   Problem Relation Age of Onset     No Medical Problems Mother      No Medical Problems Father      Breast cancer Neg Hx      Reviewed; Non-contributory      Social History:    History   Smoking Status     Current Every Day Smoker     Packs/day: 1.00     Types: Cigarettes   Smokeless Tobacco     Never Used     Smoking: yes  Alcohol use: sober x 34 years  Other drug use: none  Occupation: works at [employer]      Current Medications:  Current Outpatient Prescriptions on File Prior to Visit   Medication Sig Dispense Refill     aspirin 81 MG EC tablet Take half tab daily prn       calcium carbonate (OS-EULALIA) 600 mg (1,500 mg) tablet Take 600 mg by mouth 2 (two) times a day with meals.       ERGOCALCIFEROL, VITAMIN D2, (VITAMIN D2 ORAL) Take 2 tablets by mouth daily.       multivitamin therapeutic (THERAGRAN) tablet Take 1 tablet by mouth daily.       phenytoin (DILANTIN) 100 MG ER capsule TAKE TWO CAPSULES BY MOUTH TWICE DAILY  360 capsule 1     simvastatin (ZOCOR) 40 MG tablet TAKE 1 TABLET BY MOUTH AT BEDTIME FOR CHOLESTEROL 90 tablet 1     triamcinolone (KENALOG) 0.1 % cream Apply a thin coat of cream to affected areas three times daily. 30 g 0     No current facility-administered medications on file prior to visit.        Allergies:   No Known Allergies    I personally reviewed patient's past medical, surgical, social, family history and allergies.    ROS:  Review of Systems  See HPI for 10pt ROS, otherwise negative      OBJECTIVE:   Vitals:    07/31/17 1754   BP: 132/76   Patient Site: Right Arm   Patient Position: Sitting   Cuff Size: Adult Regular   Pulse: 76   Temp: 98.1  F (36.7  C)   TempSrc: Oral   SpO2: 97%   Weight: 140 lb 6.4 oz (63.7 kg)         General Appearance:  Alert, cooperative, no  distress, appears stated age. Afebrile. Appears comfortable sitting in chair. Rises from seated position without difficulty.  Integument: Warm, dry, no rashes or lesions.  HEENT: Atraumatic, normocephalic. Face nontraumatic. Conjunctiva clear, Lids normal.  Neck: Supple, no meningismus. No Cspine tenderness. Neck ROM intact.  Respiratory: No distress. Lungs clear to ausculation bilaterally. No crackles, wheezes, rhonchi or stridor.  Cardiovascular: Regular rate and rhythm, no murmur, rub or gallop. No obvious chest wall deformities.  Abdomen: soft, nontender, non-distended. No masses.  Musculoskeletal: Back: No C, T, or Lspine tenderness or crepitus to palpation. No paraspinal muscle tenderness or spasm. Strength testing: hip flexion, extension 5/5 bilaterally, knee flexion/extension 5/5 bilaterally, ankle plantar/dorsiflexion 5/5 bilaterally.  Straight leg raise negative bilaterally- patient flexes hip to >70 degrees without pain. Gait: patient walks with upper torso leaning to the left. No antalgia.  Neurologic: Alert and orientated appropriately. No focal deficits. Sensation intact in distal LEs. DTRs: patellar 2+ bilaterally, achilles 2+ bilaterally.

## 2021-06-12 NOTE — PROGRESS NOTES
ASSESSMENT:     Diagnoses and all orders for this visit:    Lumbalgia  -     diclofenac (VOLTAREN) 50 MG EC tablet; Take 1 tablet (50 mg total) by mouth 2 (two) times a day. With food.  Dispense: 60 tablet; Refill: 2  -     tiZANidine (ZANAFLEX) 4 MG tablet; Take 1 tablet (4 mg total) by mouth every 8 (eight) hours as needed.  Dispense: 90 tablet; Refill: 0  -     CT Lumbar Spine Without Contrast; Future; Expected date: 8/3/17    Myofascial pain  -     diclofenac (VOLTAREN) 50 MG EC tablet; Take 1 tablet (50 mg total) by mouth 2 (two) times a day. With food.  Dispense: 60 tablet; Refill: 2  -     tiZANidine (ZANAFLEX) 4 MG tablet; Take 1 tablet (4 mg total) by mouth every 8 (eight) hours as needed.  Dispense: 90 tablet; Refill: 0  -     CT Lumbar Spine Without Contrast; Future; Expected date: 8/3/17    Sleep disturbance  -     diclofenac (VOLTAREN) 50 MG EC tablet; Take 1 tablet (50 mg total) by mouth 2 (two) times a day. With food.  Dispense: 60 tablet; Refill: 2  -     tiZANidine (ZANAFLEX) 4 MG tablet; Take 1 tablet (4 mg total) by mouth every 8 (eight) hours as needed.  Dispense: 90 tablet; Refill: 0  -     CT Lumbar Spine Without Contrast; Future; Expected date: 8/3/17            Kimberly Hernandez is a 72 y.o. female  with a BMI of There is no height or weight on file to calculate BMI. who presents today in consultation at the request of Dr. Limon, Kar[place],*  for new patient evaluation of acute bilateral low back pain.  Patient has history of trauma to the lower back when she fell backwards on May 2017 while she was riding in a bus.  Her symptoms has worsened since this fall.  Patient with history of osteopenia.  No history of imaging to the lower back.  We will obtain CT lumbar scan to rule out compression fracture.  Patient symptoms also can be myofascial in nature.  I prescribed diclofenac 50 mg to be taken 1 tablet twice daily, tizanidine 4 mg 1 tablet every 8 hours as needed for muscle spasm.  No  red flags.  So she did not fill.          SIRISHA:  42  WHO-5:  Declined    PLAN:  A shared decision making model was used.  The patient's values and choices were respected.  The following represents what was discussed and decided upon by the physician and the patient.      1.  DIAGNOSTIC TESTS: I ordered lumbar CT scan to rule out compression fracture or other pathology.  2.  PHYSICAL THERAPY:  Discussed the importance of core strengthening, ROM, stretching exercises with the patient and how each of these entities is important in decreasing pain.  Explained to the patient that the purpose of physical therapy is to teach the patient a home exercise program.  These exercises need to be performed every day in order to decrease pain and prevent future occurrences of pain.  Likened it to brushing one's teeth.  Patient will start on physical therapy - [facility] program.   3.  MEDICATIONS: Patient will start on diclofenac 50 mg 1 tablet twice daily with food.  Tizanidine 4 mg 1 tablet every 8 hours as needed for muscle spasm.  Side effects of the medication discussed with the patient today.  4.  INTERVENTIONS: No therapeutic steroid injections at this time .   5.  PATIENT EDUCATION: I thoroughly discussed the plan with the patient today.  She verbalizes understanding and agrees with the plan.      FOLLOW-UP:   Patient will follow up with me in 1 weeks.  All questions were answered.      VIJAY Sims, MPA-C          SUBJECTIVE:  Kimberly Hernandez  Is a 72 y.o. female who presents today for new patient evaluation of low back pain.  She reports bilateral low back pain.  She reports that she fell on her back when she was in a bus heading to a cruise ship.  She states that she felt dizzy and fell on her back.  She was evaluated at an urgent care, where she declines lumbar x-ray at that time.  At this time she reports 4-5 out of 10 intensity pain in the lower back bilaterally.  She denies radicular pain to the lower extremity.   Her symptoms are aggravated by standing for too long, sitting for too long and rates it at 8 out of 10 intensity on its worse.  Her symptoms are mildly alleviated by taking ibuprofen 200 mg and rates it at 3-4 out of 10 intensity on its best.  Patient was started on prednisone 20 mg, 2 tablets for 5 days on July 31 of 2017.  She noticed some improvement in her symptoms with the prednisone course.  Patient had DEXA scan that was done back in October 2016 that showed osteopenia.  Patient did not have imaging to the lower back after her fall.  Patient denies urinary or bowel incontinence, unintentional weight loss, saddle anesthesia, fever or chills, balance difficulties.      Medications:    Current Outpatient Prescriptions   Medication Sig Dispense Refill     aspirin 81 MG EC tablet Take half tab daily prn       calcium carbonate (OS-EULALIA) 600 mg (1,500 mg) tablet Take 600 mg by mouth 2 (two) times a day with meals.       ERGOCALCIFEROL, VITAMIN D2, (VITAMIN D2 ORAL) Take 2 tablets by mouth daily.       multivitamin therapeutic (THERAGRAN) tablet Take 1 tablet by mouth daily.       phenytoin (DILANTIN) 100 MG ER capsule TAKE TWO CAPSULES BY MOUTH TWICE DAILY  360 capsule 1     predniSONE (DELTASONE) 20 MG tablet Take 2 tablets (40 mg total) by mouth daily for 5 days. 10 tablet 0     simvastatin (ZOCOR) 40 MG tablet TAKE 1 TABLET BY MOUTH AT BEDTIME FOR CHOLESTEROL 90 tablet 1     triamcinolone (KENALOG) 0.1 % cream Apply a thin coat of cream to affected areas three times daily. 30 g 0     diclofenac (VOLTAREN) 50 MG EC tablet Take 1 tablet (50 mg total) by mouth 2 (two) times a day. With food. 60 tablet 2     tiZANidine (ZANAFLEX) 4 MG tablet Take 1 tablet (4 mg total) by mouth every 8 (eight) hours as needed. 90 tablet 0     No current facility-administered medications for this encounter.        Allergies: No Known Allergies    PMH:    Past Medical History:   Diagnosis Date     Alcohol abuse      Back Strain      Created by Conversion      Epilepsy And Recurrent Seizures     Created by Conversion  Replacement Utility updated for latest IMO load     Hemorrhoids     Created by Conversion  Replacement Utility updated for latest IMO load     Hyperlipidemia     Created by Conversion      Osteoporosis     Created by Conversion  Replacement Utility updated for latest IMO load     Serum Enzyme Levels - Alkaline Phosphatase Elevated     Created by Conversion [facility] Annotation: Nov 26 2007 12:54PM - Katerin Beverly: Fred  10/05: nl;  Replacement Utility updated for latest IMO load       PSH:    Past Surgical History:   Procedure Laterality Date     NE REMOVE TONSILS/ADENOIDS,<13 Y/O      Description: Tonsillectomy With Adenoidectomy;  Recorded: 11/26/2007;     NE VAG HYST, W/VAGINECTOMY      Description: Vaginal Hysterectomy With Colpectomy;  Recorded: 11/26/2007;       Family History:    Family History   Problem Relation Age of Onset     No Medical Problems Mother      No Medical Problems Father      Breast cancer Neg Hx        Social History:   Social History     Social History     Marital status:      Spouse name: N/A     Number of children: 2     Years of education: N/A     Occupational History     Not on file.     Social History Main Topics     Smoking status: Current Every Day Smoker     Packs/day: 1.00     Types: Cigarettes     Smokeless tobacco: Never Used     Alcohol use Yes      Comment: recovered alcoholic x 32 years      Drug use: Not on file     Sexual activity: Not on file     Other Topics Concern     Not on file     Social History Narrative       ROS: Bilateral low back pain.  Specifically negative for bowel/bladder dysfunction, fevers,chills, appetite changes, unexplained weight loss.   Otherwise 13 systems reviewed are negative.  Please see the patient's intake questionnaire from today for details.      OBJECTIVE:  PHYSICAL EXAMINATION:    CONSTITUTIONAL:  Vital signs as above.  No acute distress.  The  patient is well nourished and well groomed.  PSYCHIATRIC:  The patient is awake, alert, oriented to person, place, time and answering questions appropriately with clear speech.    SKIN:  Skin over the face, bilateral lower extremities, and posterior torso is clean, dry, intact without rashes.    GAIT:  Gait is non-antalgic.  The patient is able to heel and toe walk without significant difficulty.    STANDING EXAMINATION: Tenderness present at the L4-L5, L5-S1 paraspinal muscle bilaterally.  MUSCLE STRENGTH:  The patient has 5/5 strength for the bilateral hip flexors, knee flexors/extensors, ankle dorsiflexors/plantar flexors, great toe extensors, ankle evertors/invertors.  NEUROLOGICAL:  2/4 patellar, medial hamstring, and achilles reflexes bilaterally.  Negative Babinski's bilaterally.  No ankle clonus bilaterally. Sensation to light touch is intact in the bilateral L4, L5, and S1 dermatomes.  VASCULAR:  2/4 pulses bilaterally.  Bilateral lower extremities are warm.  There is no pitting edema of the bilateral lower extremities.  ABDOMINAL:  Soft, non-distended, non-tender throughout all quadrants.  No pulsatile mass palpated in the left lower quadrant.  LYMPH NODES:  No palpable or tender inguinal/popliteal lymph nodes.  MUSCULOSKELETAL: Negative straight leg raise bilaterally.  Negative hip impingement and Rai test bilaterally.      RESULTS: No imaging to review today.

## 2021-06-12 NOTE — PROGRESS NOTES
[facility]   Lumbo-Pelvic Initial Evaluation    Patient Name: Kimberly Hernandez  Date of evaluation: 8/16/2017  Referral Diagnosis:   Lumbalgia [M54.5]  - Primary       Myofascial pain [M79.1]       Sleep disturbance [G47.9]       Facet arthropathy, lumbar [M12.88]       Referring provider: Stephan Mccurdy PA-C  Visit Diagnosis:     ICD-10-CM    1. Chronic bilateral low back pain without sciatica M54.5     G89.29    2. Decreased ROM of lumbar spine M25.60    3. Generalized muscle weakness M62.81    4. Osteoporosis M81.0        Assessment:        Kimberly Hernandez is a 72 y.o. female who presents to therapy today with chief complaints of low back pain which started in May after a fall off of stairs when she fainted. The patient did not have pain immediately, but started when the patient returned to work after vacation. She is clear of fracture. She has the most difficulty with bending, lifting, and working cleaning tables. With examination today, she has a normal musculoskeletal exam and myotome/dermatome screen, with the exception of pain with lumbar extension. All palpation, SI testing, and neural tension screens were negative. The patient did report benefit in the session from stretching. She will likely benefit from skilled PT to improve lumbar ROM, core strength, pain and overall function.     Goals:  Pt. will be independent with home exercise program in : 4 weeks  Pt. will report decreased intensity, frequency of : Pain;in 4 weeks;Comment  Comment:: 50%  Patient will decrease : SIRISHA score;by _ points;for improved quality of function;in 6 weeks  by ___ points: 30%  Pt will: be able to go for longer walks and go to the fair without increased pain in 6 weeks:    Patient's expectations/goals are realistic.    Barriers to Learning or Achieving Goals:  Chronicity of the problem.       Plan / Patient Instructions:        Plan of Care:   Communication with: Referral Source  Patient Related Instruction:  Nature of Condition;Treatment plan and rationale;Self Care instruction;Basis of treatment;Expected outcome;Next steps;Precautions;Posture;Body mechanics  Times per Week: 1  Number of Weeks: 8  Number of Visits: 8  Discharge Planning: pt will meet all PT goals or reach a plateau with PT  Precautions / Restrictions : osteoporosis  Therapeutic Exercise: ROM;Stretching;Strengthening  Neuromuscular Reeducation: kinesio tape;posture;core;TNE  Manual Therapy: soft tissue mobilization;myofascial release;joint mobilization;muscle energy  Modalities: TENS;ultrasound;cold pack;traction    Plan for next visit: Continue with lumbar strengthening and stretching, TA and core strengthening as well.     Subjective:         Social information:   Occupation:[employer] 1X/week   Work Status:Working part time cleans tables    History of Present Illness:      Pain started she fell when she was on a stairs on a bus, fell backwards in May. She does not report any back pain prior to this. The pain has gotten worse. Initially there was no pain after the injury. The pain started after she went back to work after the cruise where she fell.  Pain described as aching pain in the back across both sides, constant, does not subside. Denies numbness/tingling/burning or weakness. Does report some dizziness over the last month- no rhyme or reason to this. She did just get eyes checked and is getting new prescription for this. Dizziness happens 4-5X/day, takes a few minutes to go away sitting.   Worse with bending, stairs, lifting.  Better with tylenol, muscle relaxor.  Previous treatment heat and medication.  History of osteoporosis, epilepsy.    Pain Ratin  Pain rating at best: 5  Pain rating at worst: 7  Pain description: aching and pain    Functional limitations are described as occurring with:   Bending, stairs, lifting, carrying objects.         Objective:      Note: Items left blank indicates the item was not performed or not indicated at  the time of the evaluation.    Patient Outcome Measures :    Modified Oswestry Low Back Pain Disablity Questionnaire  in %: 32   Scores range from 0-100%, where a score of 0% represents minimal pain and maximal function. The minimal clinically important difference is a score reduction of 12%.    Examination  1. Chronic bilateral low back pain without sciatica     2. Decreased ROM of lumbar spine     3. Generalized muscle weakness     4. Osteoporosis       Precautions/Restrictions: Osteoporosis adn dizziness  Involved side: Bilateral  Posture Observation:      General sitting posture is  fair.  General standing posture is fair.    Lumbar ROM:    Date: 8/16/17     *Indicate scale AROM AROM AROM   Lumbar Flexion WNL (10 cm)     Lumbar Extension Mild Limit Painful      Right Left Right Left Right Left   Lumbar Sidebending WNL WNL       Lumbar Rotation WNL feels good WNL feels good       Thoracic Rotation           Lower Extremity Strength:     Date: 8/16/17     LE strength/5 Right Left Right Left Right Left   Hip Flexion (L1-3) 5 5       Hip Extension (L5-S1) 5 5       Hip Abduction (L4-5) 5 5       Hip Adduction (L2-3)         Hip External Rotation         Hip Internal Rotation         Knee Extension (L3-4) 5 5       Knee Flexion 5 5       Ankle Dorsiflexion (L4-5) 5 5       Great Toe Extension (L5) 5 5       Ankle Plantar flexion (S1) 5 5       Abdominals        Sensation    WNL to lt touch sensation screen   Reflex Testing  Lumbar Dermatomes Right Left UE Reflexes Right Left   Iliac Crest and Groin (L1)   Biceps (C5-6)     Anterior Medial Thigh (L2)   Brachioradialis (C5-6)     Anterior Thigh, Medial Epicondyle Femur (L3)   Triceps (C7-8)     Lateral Thigh, Anterior Knee, Medial Leg/Malleolus (L4)   Stefan s test     Lateral Leg, Dorsal Foot (L5)   LE Reflexes     Lateral Foot (S1)   Patellar (L3-4)     Posterior Leg (S2)   Achilles (S1-2)     Other:   Babinski Response       Palpation: No tenderness to palpation  of lumbar paraspinals or QL    Lumbar Special Tests:     Lumbar Special Tests Right Left SI Tests Right  Left   Quadrant test   SI Compression     Straight leg raise 80 80 SI Distraction     Crossover response   POSH Test - -   Slump - - Sacral Thrust - -   Sit-up test  FADIR - -   Trunk extensor endurance test  Resisted Abduction - -   Prone instability test  GUERITA - -   Pubic shotgun  Other:       Repeated Motion Testing:  Does not centralize  Does not peripheralize    Passive Mobility - Joint Integrity:  WNL    LE Screen:  WNL    Treatment Today     TREATMENT MINUTES COMMENTS   Evaluation 25 Low complexity   Self-care/ Home management     Manual therapy     Neuromuscular Re-education     Therapeutic Activity     Therapeutic Exercises 25 Education on arthritis/degenerative disc disease, discussed dizziness and f/u with referring, education on HEP and importance of movement and stretching.   Gait training     Modality__________________                Total 50    Blank areas are intentional and mean the treatment did not include these items.     PT Evaluation Code: (Please list factors)  Patient History/Comorbidities: osteoporosis, epilepsy.  Examination: see objective  Clinical Presentation: stable  Clinical Decision Making: low    Patient History/  Comorbidities Examination  (body structures and functions, activity limitations, and/or participation restrictions) Clinical Presentation Clinical Decision Making (Complexity)   No documented Comorbidities or personal factors 1-2 Elements Stable and/or uncomplicated Low   1-2 documented comorbidities or personal factor 3 Elements Evolving clinical presentation with changing characteristics Moderate   3-4 documented comorbidities or personal factors 4 or more Unstable and unpredictable High                Cj LONG  8/16/2017  11:56 AM

## 2021-06-12 NOTE — PROGRESS NOTES
[facility] Daily Progress     Patient Name: Kimberly Hernandez  Date: 8/23/2017  Visit #: 2  PTA visit #:  -  Referral Diagnosis:  Lumbalgia [M54.5]  - Primary       Myofascial pain [M79.1]       Sleep disturbance [G47.9]       Facet arthropathy, lumbar [M12.88]       Referring provider: Stephan Mccurdy PA-C  Visit Diagnosis:     ICD-10-CM    1. Chronic bilateral low back pain without sciatica M54.5     G89.29    2. Decreased ROM of lumbar spine M25.60    3. Generalized muscle weakness M62.81    4. Osteoporosis M81.0      Kimberly Hernandez is a 72 y.o. female who presents to therapy today with chief complaints of low back pain which started in May after a fall off of stairs when she fainted. The patient did not have pain immediately, but started when the patient returned to work after vacation. She is clear of fracture. She has the most difficulty with bending, lifting, and working cleaning tables. With examination today, she has a normal musculoskeletal exam and myotome/dermatome screen, with the exception of pain with lumbar extension. All palpation, SI testing, and neural tension screens were negative. The patient did report benefit in the session from stretching. She will likely benefit from skilled PT to improve lumbar ROM, core strength, pain and overall function.     Assessment:     HEP/POC compliance is  good .     The patient has no complaints of pain and is tolerating exercises well. She is motivated and progressing well. She will schedule with PT in a week or two to progress exercises as needed.    Goal Status:  Pt. will be independent with home exercise program in : 4 weeks  Pt. will report decreased intensity, frequency of : Pain;in 4 weeks;Comment  Comment:: 50%  Patient will decrease : SIRISHA score;by _ points;for improved quality of function;in 6 weeks  by ___ points: 30%  Pt will: be able to go for longer walks and go to the fair without increased pain in 6 weeks:    Plan / Patient  Education:     Continue with initial plan of care.     Plan for next visit: Continue with lumbar strengthening and stretching, TA and core strengthening as well.    Subjective:     Pain Ratin     Exercises are going well. She has joined the [facility] as well which she is looking forward to.     Objective:     Educated on avoiding loaded flexion/extension machines, as well as rotation resisted machine due to osteoporosis.     Progressed HEP with [employer] Marching, band resisted rows and bridging.      Treatment Today     TREATMENT MINUTES COMMENTS   Evaluation     Self-care/ Home management     Manual therapy     Neuromuscular Re-education     Therapeutic Activity     Therapeutic Exercises 25 Review and progression of HEP. Discussion of safety at the gym.   Gait training     Modality__________________                Total 25    Blank areas are intentional and mean the treatment did not include these items.       Cj LONG  2017

## 2021-06-13 NOTE — PROGRESS NOTES
Assessment / Plan:     Diagnoses and all orders for this visit:    Lumbalgia    Myofascial pain    Sleep disturbance    Facet arthropathy, lumbar          Kimberly Hernandez is a 72 y.o. y.o. female with past medical history significant for osteoporosis, hyperlipidemia, vitamin D deficiency, who presents today for follow-up regarding bilateral low back pain.  Patient symptoms are likely myofascial in nature.  Patient reports complete resolution of her back pain symptoms with physical therapy and exercises at the [facility].  She is not taking Voltaren for pain.  She has no complaints today.  No red flags.    A shared decision making plan was used.  The patient's values and choices were respected.  The following represents what was discussed and decided upon by the physician and the patient.      1.  DIAGNOSTIC TESTS: I reviewed the lumbar MRI imaging and the report with the patient today.  He verbalizes understanding.  2.  PHYSICAL THERAPY: Patient will continue with exercises at the [facility].   3.  MEDICATIONS: Patient will take diclofenac as needed for pain.  4.  INTERVENTIONS: No therapeutic injections.  5.  PATIENT EDUCATION: I thoroughly discussed the plan with the patient today.  She verbalizes understanding and agrees with the plan.  6.  FOLLOW-UP: Patient will follow up with me in as needed.  All questions were answered.      VIJAY Sims, MPA-C      Subjective:     Kimberly Hernandez is a 72 y.o. female who presents today for follow-up regarding bilateral low back pain.  Today patient returns to the clinic reporting complete cessation of her low back pain.  She has been going to [facility] and performing exercises.  She has a  at the [facility] and feels that it is helping a lot.  At this time she reports 0 out of 10 intensity pain in the lower back.  Patient stated that she has not been taking Voltaren for pain since she has been doing well.  She stated that she goes to the [facility] almost on a daily basis.  Patient  denies urinary or bowel incontinence, unintentional weight loss, saddle anesthesia, fever or chills, balance difficulties.    Review of Systems:  History of low back pain. Patient denies urinary or bowel incontinence, unintentional weight loss, saddle anesthesia, fever or chills, balance difficulties.       Objective:   CONSTITUTIONAL:  Vital signs as above.  No acute distress.  The patient is well nourished and well groomed.    PSYCHIATRIC:  The patient is awake, alert, oriented to person, place and time.  The patient is answering questions appropriately with clear speech.  Normal affect.  SKIN:  Skin over the face, posterior torso, bilateral upper and lower extremities is clean, dry, intact without rashes.  MUSCULOSKELETAL:  Gait is non-antalgic.  The patient is able to heel and toe walk without any difficulty.  No tenderness over the L4-5 and 5 S1 lumbar paraspinal muscles.      The patient has 5/5 strength for the bilateral hip flexors, knee flexors/extensors, ankle dorsiflexors/plantar flexors, ankle evertors/invertors.    NEUROLOGICAL:  2/4 patellar, medial hamstring, achilles reflexes which are symmetric bilaterally.  No ankle clonus bilaterally.  Sensation to light touch is intact in the bilateral L4, L5, and S1 dermatomes.       RESULTS:      [facility]  CT LUMBAR SPINE WO CONTRAST  8/8/2017 2:11 PM      INDICATION: Lumbalgia, history of fall.  TECHNIQUE: Helical thin-section CT scan of the lumbar spine was performed using bone reconstruction algorithm. From the axial source data, sagittal and coronal thin reformats. Dose reduction techniques were used.   IV CONTRAST: None.  COMPARISON: None.      FINDINGS: Five lumbar-type vertebral bodies. Mild left convexity lumbar curvature. Subtle retrolisthesis of L1 on L2. Alignment is otherwise normal. 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 No acute fracture. No high-grade canal or   foraminal narrowing. Mild loss of disc space  height L3-L4 through L5-S1. Moderate L5-S1 facet arthropathy. The visualized soft tissues of the abdomen demonstrate vascular calcifications though are otherwise grossly normal.      IMPRESSION:   CONCLUSION:  1.  No acute lumbar spine fracture.      2.  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      3.  Mild left convexity lumbar curvature.      4.  No high-grade canal or foraminal narrowing.      5.  Mild multilevel degenerative disc disease and moderate L5-S1 facet arthropathy.

## 2021-06-13 NOTE — PROGRESS NOTES
[facility] Discharge Summary  Patient Name: Kimberly Hernandez  Date: 9/29/2017  Referral Diagnosis:   Lumbalgia [M54.5]  - Primary       Myofascial pain [M79.1]       Sleep disturbance [G47.9]       Facet arthropathy, lumbar [M12.88]       Referring provider: Stephan Mccurdy PA-C  Visit Diagnosis:   1. Chronic bilateral low back pain without sciatica     2. Decreased ROM of lumbar spine     3. Generalized muscle weakness     4. Osteoporosis         Goals: All Goals Met  Pt. will be independent with home exercise program in : 4 weeks  Pt. will report decreased intensity, frequency of : Pain;in 4 weeks;Comment  Comment:: 50%  Patient will decrease : SIRISHA score;by _ points;for improved quality of function;in 6 weeks  by ___ points: 30%  Pt will: be able to go for longer walks and go to the fair without increased pain in 6 weeks:    Patient was seen for 2 visits from 8/16/17 to 8/23/17 with 0 missed appointments.    The patient met goals and has demonstrated understanding of and independence in the home program for self-care, and progression to next steps.  She will initiate contact if questions or concerns arise.    Therapy will be discontinued at this time.  The patient will need a new referral to resume.    Thank you for your referral.  Cj LONG  9/29/2017  8:26 AM
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
## 2021-06-14 NOTE — PROGRESS NOTES
[facility] Clinic Note  Patient Name: Kimberly Hernandez  Patient Age: 72 y.o.  YOB: 1945  MRN: [id number]  ?  Date of Visit: 11/14/2017  Reason for Office Visit:   Chief Complaint   Patient presents with     Urinary Tract Infection     Symptoms for 3 days. Blood while voiding, burning, fever, frequency and urgency, no foul odar, no abd pressure.      HPI: Kimberly Hernandez 72 y.o. female who presents to clinic for 3 days UTI symptoms. Hematuria, burning while voiding increased frequency and urgency. No abdominal pain, flank pain. She states she had a fever yesterday of 101. Afebrile today. Has not taken any antipyretics. She is drinking plenty of fluids.     Review of Systems: As noted in HPI     Current Scheduled Meds:  Outpatient Encounter Prescriptions as of 11/14/2017   Medication Sig Dispense Refill     aspirin 81 MG EC tablet Take half tab daily prn       atorvastatin (LIPITOR) 40 MG tablet TAKE ONE TABLET BY MOUTH AT BEDTIME  90 tablet 2     calcium carbonate (OS-EULALIA) 600 mg (1,500 mg) tablet Take 600 mg by mouth 2 (two) times a day with meals.       diclofenac (VOLTAREN) 50 MG EC tablet Take 1 tablet (50 mg total) by mouth 2 (two) times a day. With food. 60 tablet 2     ERGOCALCIFEROL, VITAMIN D2, (VITAMIN D2 ORAL) Take 2 tablets by mouth daily.       multivitamin therapeutic (THERAGRAN) tablet Take 1 tablet by mouth daily.       phenytoin (DILANTIN) 100 MG ER capsule TAKE TWO CAPSULES BY MOUTH TWICE DAILY  360 capsule 3     simvastatin (ZOCOR) 40 MG tablet TAKE 1 TABLET BY MOUTH AT BEDTIME FOR CHOLESTEROL 90 tablet 1     triamcinolone (KENALOG) 0.1 % cream Apply a thin coat of cream to affected areas three times daily. 30 g 0     [DISCONTINUED] naproxen (NAPROSYN) 375 MG tablet Take 1 tablet (375 mg total) by mouth 2 (two) times a day as needed. With meals. 60 tablet 0     ciprofloxacin HCl (CIPRO) 250 MG tablet Take 1 tablet (250 mg total) by mouth 2 (two) times a day for 7 days.  "14 tablet 0     No facility-administered encounter medications on file as of 11/14/2017.        Objective / Physical Examination:  /72  Pulse 83  Temp 98  F (36.7  C)  Ht 5' 4\" (1.626 m)  Wt 142 lb (64.4 kg)  BMI 24.37 kg/m2  Wt Readings from Last 3 Encounters:   11/14/17 142 lb (64.4 kg)   09/14/17 132 lb 3.2 oz (60 kg)   08/10/17 141 lb (64 kg)     Body mass index is 24.37 kg/(m^2). (>25?)    General Appearance: Alert and oriented in no acute distress  Back: no cva tenderness  Cardiovascular: RRR  Abdomen: Soft, non-tender.  Integumentary: Warm and dry.  Neuro: Alert and oriented, follows commands appropriately    Assessment / Plan / Medical Decision Making:      Encounter Diagnoses   Name Primary?     Dysuria Yes        1. Dysuria    U/a and clinically appears to be a UTI and due to fever will be a bit more aggressive and start on cipro BID x 7 days. Drink plenty of water, cranberry juice and probiotics while taking and after antibiotics.     Discussed side effects of floroquinolones including potential for achilles injury. Call if having achilles pain and stop abx    - Urinalysis-UC if Indicated; Future  - ciprofloxacin HCl (CIPRO) 250 MG tablet; Take 1 tablet (250 mg total) by mouth 2 (two) times a day for 7 days.  Dispense: 14 tablet; Refill: 0  - Urinalysis-UC if Indicated  - Culture, Urine    Total time spent with patient was 15 minutes with >50% of time spent in face-to-face counseling regarding the above plan     Pasquale Rocha MD  [facility]   "

## 2021-06-15 NOTE — TELEPHONE ENCOUNTER
Pharmacy requesting refill of phenytoin (DILANTIN)    Pt last seen 11/23/20   Return in about 6 months (around 5/23/2021) for Next scheduled follow up.      1. Epilepsy (H)  - repeat phenytoin level later this afternoon. If in a normal range, she will continue phenytoin 2 capsules AM and 1 capsule PM

## 2021-06-15 NOTE — PROGRESS NOTES
Internal Medicine Office Visit  [facility]   Patient Name: Kimberly Hernandez  Patient Age: 75 y.o.  YOB: 1945  MRN: [id number]    Date of Visit: 3/1/2021  Reason for Office Visit:   Chief Complaint   Patient presents with     Toe Pain     right big toe X 2 weeks, not red, no injuries     Nevus     wants moles on back and breast checked           Assessment / Plan / Medical Decision Making:    Problem List Items Addressed This Visit     None      Visit Diagnoses     Ingrown toenail of right foot    -  Primary    Relevant Orders    [facility]    Seborrheic keratoses        Relevant Orders    Ambulatory referral to [facility]    Elevated blood pressure reading without diagnosis of hypertension        - initial systolic readings are very elevated. She denies chest pain, headache, vision changes  - Return in 2 weeks for [facility] visit BP check            I am having Kimberly Hernandez maintain her multivitamin therapeutic, calcium carbonate, (ERGOCALCIFEROL, VITAMIN D2, (VITAMIN D2 ORAL)), aspirin, artificial tears(hypromellose), and phenytoin.          Orders Placed This Encounter   Procedures     Ambulatory referral to Podiatry [facility] (includes FPA groups)     Ambulatory referral to Dermatology [facility]   Followup: Return in about 6 months (around 9/1/2021) for Next scheduled follow up. earlier if needed.        Luz Cedillo CNP        HPI:  Kimberly Hernandez is a 75 y.o. year old who presents to the office today for a painful right great toe. It started bothering her 2 weeks ago. No prior history of ingrown toenails.     She has a large skin lesion on the right lateral chest. It is raised and catches on her bra.     Blood pressure is elevated, no history of hypertension. No chest pain, dyspnea, headache, vision changes.     Health  Maintenance Review  Health Maintenance   Topic Date Due     COVID-19 Vaccine (1 of 2) 05/25/1962     ZOSTER VACCINES (2 of 3) 09/02/2010     MEDICARE ANNUAL WELLNESS VISIT  05/25/2011     FALL RISK ASSESSMENT  08/21/2021     ADVANCE CARE PLANNING  10/25/2021     MAMMOGRAM  01/22/2023     TD 18+ HE  07/15/2024     LIPID  03/11/2025     COLORECTAL CANCER SCREENING  10/25/2026     DEXA SCAN  09/27/2034     HEPATITIS C SCREENING  Completed     Pneumococcal Vaccine: Pediatrics (0 to 5 Years) and At-Risk Patients (6 to 64 Years)  Completed     Pneumococcal Vaccine: 65+ Years  Completed     INFLUENZA VACCINE RULE BASED  Completed     HEPATITIS B VACCINES  Aged Out       Current Scheduled Meds:  Outpatient Encounter Medications as of 3/1/2021   Medication Sig Dispense Refill     artificial tears,hypromellose, (PURE & GENTLE) 0.3 % Drop ophthalmic drops Administer 2 drops to both eyes 4 (four) times a day as needed. 30 mL 11     aspirin 81 MG EC tablet Take half tab daily prn       calcium carbonate (OS-EULALIA) 600 mg (1,500 mg) tablet Take 600 mg by mouth 2 (two) times a day with meals.       ERGOCALCIFEROL, VITAMIN D2, (VITAMIN D2 ORAL) Take 2 tablets by mouth daily.       multivitamin therapeutic (THERAGRAN) tablet Take 1 tablet by mouth daily.       phenytoin (DILANTIN) 100 MG ER capsule Take 2 capsules (200 mg total) by mouth every morning AND 1 capsule (100 mg total) every evening. 270 capsule 1     No facility-administered encounter medications on file as of 3/1/2021.            Objective / Physical Examination:  Vitals:    03/01/21 0351 03/01/21 1549 03/01/21 1550   BP: 140/70 (!) 208/61 (!) 216/65   Patient Site:  Right Arm Left Arm   Patient Position:  Sitting Sitting   Cuff Size:  Adult Regular Adult Regular     Wt Readings from Last 3 Encounters:   08/21/20 123 lb (55.8 kg)   03/13/20 129 lb 1 oz (58.5 kg)   03/11/20 130 lb (59 kg)     There is no height or weight on file to calculate BMI.     Constitutional: In no  apparent distress  Skin: brown, textured lesions without suspicious features on the back and chest which range in size from 5 mm to 15 mm    Right great toenail: bilateral great toenail penetrates the nail fold. No erythema or drainage   Psych: Alert and oriented x3.

## 2021-06-15 NOTE — PROGRESS NOTES
[facility] Clinic Note  Patient Name: Kimberly Hernandez  Patient Age: 72 y.o.  YOB: 1945  MRN: [id number]  ?  Date of Visit: 12/26/2017  Reason for Office Visit:   Chief Complaint   Patient presents with     Back Pain     Morslt middle and right side. x4 days. She fell and hit her back. sitting helps but nothing else seems to help. Laying down makes it worse         HPI: Kimberly Hernandez 72 y.o. female who presents to clinic for fall at home and hit her back 4 days ago. She was bending over to reach for something, lost her balance and hit a bucket by her fire place, she her her lower back. She didn't hit her head or black out. Since then she has pain when getting up and moving around, and has some pain when lying at night sleeping. She has been taking four tylenol per day. She did ice it after the fall. Pain does not radiate, it is localized across middle of lower back. No tingling/numbness, weakness in legs. No incontinence     She also has 2 warty lesions on her left leg and lower abdomen that have been there for 'years'. She was wondering if we can freeze them or cut them off today. They have not changed or grown in years.       Review of Systems: As noted in HPI     Current Scheduled Meds:  Outpatient Encounter Prescriptions as of 12/26/2017   Medication Sig Dispense Refill     aspirin 81 MG EC tablet Take half tab daily prn       atorvastatin (LIPITOR) 40 MG tablet TAKE ONE TABLET BY MOUTH AT BEDTIME  90 tablet 2     calcium carbonate (OS-EULALIA) 600 mg (1,500 mg) tablet Take 600 mg by mouth 2 (two) times a day with meals.       diclofenac (VOLTAREN) 50 MG EC tablet Take 1 tablet (50 mg total) by mouth 2 (two) times a day. With food. 60 tablet 2     ERGOCALCIFEROL, VITAMIN D2, (VITAMIN D2 ORAL) Take 2 tablets by mouth daily.       multivitamin therapeutic (THERAGRAN) tablet Take 1 tablet by mouth daily.       phenytoin (DILANTIN) 100 MG ER capsule TAKE TWO CAPSULES BY MOUTH TWICE DAILY   360 capsule 3     cyclobenzaprine (FLEXERIL) 10 MG tablet Take 1 tablet (10 mg total) by mouth at bedtime as needed for muscle spasms. 15 tablet 0     simvastatin (ZOCOR) 40 MG tablet TAKE 1 TABLET BY MOUTH AT BEDTIME FOR CHOLESTEROL 90 tablet 1     triamcinolone (KENALOG) 0.1 % cream Apply a thin coat of cream to affected areas three times daily. 30 g 0     No facility-administered encounter medications on file as of 12/26/2017.        Objective / Physical Examination:  /62 (Patient Site: Right Arm, Patient Position: Sitting, Cuff Size: Adult Regular)  Pulse 65  Wt 138 lb 12.8 oz (63 kg)  BMI 23.82 kg/m2  Wt Readings from Last 3 Encounters:   12/26/17 138 lb 12.8 oz (63 kg)   11/14/17 142 lb (64.4 kg)   09/14/17 132 lb 3.2 oz (60 kg)     Body mass index is 23.82 kg/(m^2). (>25?)    General Appearance: Alert and oriented in no acute distress  Back: Symmetrical and bilateral paraspinous muscle tenderness and mild midline tenderness. Ecchymoses over right side. Full ROM. SLR negative.   Lungs: normal inspiratory and expiratory effort.   Cardiovascular: RRR   Abdomen: Bowel sounds active all four quadrants. Soft, non-tender.   Extremities: strength equal LE  Integumentary: Warm and dry. 2 discrete cauliflower like lesions both about 1 cm with pedunculated base, one on inner right thigh and the other over left lower abdomen  Neuro: Alert and oriented, follows commands appropriately. Patellar reflexes symmetric.     Assessment / Plan / Medical Decision Making:      Encounter Diagnoses   Name Primary?     Acute bilateral low back pain without sciatica Yes     Skin lesions         1. Acute bilateral low back pain without sciatica  No red flags on exam or neuro deficits. Deferred imaging. Treat supportively with nsaids prn, ice/heat/stretching/ and can try a muscle relaxant at night if needed.   - cyclobenzaprine (FLEXERIL) 10 MG tablet; Take 1 tablet (10 mg total) by mouth at bedtime as needed for muscle spasms.   Dispense: 15 tablet; Refill: 0    2. Skin lesions  2 < 1 cm warty lesions on leg and lower abdomen were frozen today using cryotherapy freeze and thaw method. Patient tolerated procedure well.     Follow up with PCP as needed    Total time spent with patient was 20 minutes with >50% of time spent in face-to-face counseling regarding the above plan     Pasquale Rocha MD  [facility]

## 2021-06-15 NOTE — PROGRESS NOTES
I met with Kimberly Hernandez at the request of ABDULLAHI Cedillo DNP to recheck her blood pressure.  Blood pressure medications on the MAR were reviewed with patient.    Patient has taken all medications as per usual regimen: Yes  Patient reports tolerating them without any issues or concerns: No    Vitals:    03/16/21 1040 03/16/21 1046   BP: 162/62 162/58   Patient Site: Right Arm Right Arm   Patient Position: Sitting Sitting   Cuff Size: Adult Regular Adult Regular       After 5 minutes, the patient's blood pressure remained greater than or equal to 140/90.    Is the patient currently having any chest pain?  No  Does the patient currently have a headache?   No  Does the patient currently have any vision changes? No  Does the patient currently have any nausea? No  Does the patient currently have any abdominal pain? No    The previous encounter was reviewed.  The patient was discharged and the note will be sent to the provider for final review.

## 2021-06-16 PROBLEM — R41.89 COGNITIVE DECLINE: Status: ACTIVE | Noted: 2018-12-11

## 2021-06-16 PROBLEM — I10 ESSENTIAL HYPERTENSION: Status: ACTIVE | Noted: 2021-06-03

## 2021-06-16 PROBLEM — Z72.0 TOBACCO USE: Status: ACTIVE | Noted: 2020-03-10

## 2021-06-16 PROBLEM — I72.0 ANEURYSM OF CAROTID ARTERY (H): Status: ACTIVE | Noted: 2020-03-11

## 2021-06-16 PROBLEM — Z66 DNR (DO NOT RESUSCITATE): Status: ACTIVE | Noted: 2018-04-25

## 2021-06-16 PROBLEM — I67.1 CEREBRAL ANEURYSM: Status: ACTIVE | Noted: 2018-12-14
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
## 2021-06-16 NOTE — PROGRESS NOTES
Call patient: her blood pressure is elevated again and diagnostic for hypertension. I would like her to start hydrochlorothiazide 25 mg daily and return in 2-3 weeks for an office visit follow up with me.

## 2021-06-16 NOTE — TELEPHONE ENCOUNTER
Luz Cedillo FNP at 3/17/2021  9:47 AM    Status: Signed      Call patient: her blood pressure is elevated again and diagnostic for hypertension. I would like her to start hydrochlorothiazide 25 mg daily and return in 2-3 weeks for an office visit follow up with me.          ----- Message -----  From: Sarai Leon CMA  Sent: 3/16/2021  10:50 AM CDT  To: HAIR [place]

## 2021-06-16 NOTE — PATIENT INSTRUCTIONS - HE
Podiatry Clinics that offer foot and toenail care  [facility]  [facility]  [phone]    Foot and Ankle Clinics, [facility]  [phone]    [facility]  [place] - Dr. Garcia on Tuesdays  [phone]    [facility]  [place]  [phone]    [facility]  [facility] and [place]  [phone]    [facility]  [phone]    [facility]  [phone]    [facility]  They have several locations and have a team of registered nurses that offer diabetic foot care.  They do not bill to insurance and the average cost per visit is $37.  [facility]  [phone]    [facility]  *Nurse comes to your home for nail care.  Kimberly Archer RN Foot Specialist  [phone]

## 2021-06-17 NOTE — PROGRESS NOTES
Internal Medicine Office Visit  [facility]  Patient Name: Kimberly Hernandez  Patient Age: 72 y.o.  YOB: 1945  MRN: [id number]    Date of Visit: 2018  Reason for Office Visit:   Chief Complaint   Patient presents with     Colonoscopy     discuss options           Assessment / Plan / Medical Decision Makin. DNR (do not resuscitate)  - Reviewed code status. She would want to be intubated if she stopped breathing if the condition was considered reversible but does not want chest compressions     2. Hyperlipidemia  - Continue atorvastatin 40 mg daily   - Comprehensive Metabolic Panel; Future  - Lipid Profile; Future  - JIC LAV; Future    3. Osteopenia  - Reviewed chart, she is overdue for Reclast infusion. Will call patient to let her know I found the date of her last infusion after her office visit to see if she would like to schedule a repeat infusion    4. Seizure disorder  - Continue phenytoin   - HM2(CBC w/o Differential); Future    5. Memory change  - I agree that she exhibits memory changes and is forgetful. She was unable to recall the details of her neurology evaluation and forgot that she had undergone a cerebral angiogram. She will keep scheduled neurology follow up appointment       Health Maintenance Review  Health Maintenance   Topic Date Due     MAMMOGRAM  10/27/2018     DXA SCAN  2018     FALL RISK ASSESSMENT  2018     ADVANCE DIRECTIVES DISCUSSED WITH PATIENT  10/25/2021     TD 18+ HE  07/15/2024     COLONOSCOPY  10/25/2026     PNEUMOCOCCAL POLYSACCHARIDE VACCINE AGE 65 AND OVER  Completed     INFLUENZA VACCINE RULE BASED  Completed     PNEUMOCOCCAL CONJUGATE VACCINE FOR ADULTS (PCV13 OR PREVNAR)  Completed     ZOSTER VACCINE  Completed      Discussed new shingles vaccine. She will check on cost through her insurance       I have discontinued Ms. Hernandez's simvastatin, diclofenac, and cyclobenzaprine. I have also changed her  atorvastatin and phenytoin. Additionally, I am having her maintain her multivitamin therapeutic, calcium carbonate, (ERGOCALCIFEROL, VITAMIN D2, (VITAMIN D2 ORAL)), aspirin, and triamcinolone.      HPI:  Kimberly Hernandez is a 72 y.o. year old who presents to the office today for follow up of chronic medical conditions.     Concerns about memory particularly with history of CVA in the past she thinks about the potential for a vascular dementia with gradual memory changes noted. She has an upcoming neurology appointment after there were abnormalities in her memory screening tests at an appointment with Dr. Rocha on 3/29/18. She is , has a son who lives in this area and 3 grandchildren locally for support but he is very busy with work. Very socially active.     She had a fall in the fall last year while on a cruise in [place]. She has not had any syncopal episodes, presyncope, or other falls since this time. She was seen by neurology who repeated an EEG and cerebral angiogram, she does not recall the results of these tests.      We reviewed her code status. She wishes to be DNR but would okay if she were intubated if she had a curable and reversible condition.     Bone density was reviewed from 10/2016, results showed osteopenia with a high fx risk. I reviewed the telephone encounter that she declined Fosamax due to an unknown intolerance. She had a reclast infusion 12/2016 after review of her chart.     Review of Systems- pertinent positive in bold:  Constitutional: Fever, chills, night sweats, fainting, weight change, fatigue, seizures, dizziness, sleeping difficulties, loud snoring/pauses in breathing  Eyes: change in vision, blurred or double vision, redness/eye pain  Ears, nose, mouth, throat: change in hearing, ear pain, hoarseness, difficulty swallowing, sores in the mouth or throat  Respiratory: shortness of breath, cough, bloody sputum, wheezing  Cardiovascular: chest pain, palpitations    Gastrointestinal: abdominal pain, heartburn/indigestion, nausea/vomiting, change in appetite, change in bowel habits, constipation or diarrhea, rectal bleeding/dark stools, difficulty swallowing  Urinary: painful urination, frequent urination, urinary urgency/incontinence, blood in urine/dark urine, nocturia  Genital: WOMEN: vaginal discharge or odor, bleeding/pain with intercourse, pelvic pain, vulvar/vaginal itching or burning, excessive menstrual bleeding, problems with sexual function  MEN: pain/lump in testicles, difficulty with erections, problems with sexual function  Musculoskeletal: backache/back pain (new or increasing), weakness, joint pain/stiffness (new or increasing), muscle cramps, swelling of hands, feet, ankles, leg pain/redness  Skin: change in moles/freckles, rash, nodules  Hematologic/lymphatic: swollen lymph glands, abnormal bruising/bleeding  Endocrine: excessive thirst/urination, cold or heat intolerance  Breast: breast lump, breast pain, nipple discharge/skin changes  Neurologic/emotional: worrisome memory change, numbness/tingling, anxiety, mood swings      Current Scheduled Meds:  Outpatient Encounter Prescriptions as of 4/25/2018   Medication Sig Dispense Refill     aspirin 81 MG EC tablet Take half tab daily prn       atorvastatin (LIPITOR) 40 MG tablet Take 1 tablet (40 mg total) by mouth at bedtime. 90 tablet 3     calcium carbonate (OS-EULALIA) 600 mg (1,500 mg) tablet Take 600 mg by mouth 2 (two) times a day with meals.       ERGOCALCIFEROL, VITAMIN D2, (VITAMIN D2 ORAL) Take 2 tablets by mouth daily.       multivitamin therapeutic (THERAGRAN) tablet Take 1 tablet by mouth daily.       phenytoin (DILANTIN) 100 MG ER capsule Take 2 capsules (200 mg total) by mouth 2 (two) times a day. 360 capsule 3     triamcinolone (KENALOG) 0.1 % cream Apply a thin coat of cream to affected areas three times daily. 30 g 0     [DISCONTINUED] atorvastatin (LIPITOR) 40 MG tablet TAKE ONE TABLET BY MOUTH  AT BEDTIME  90 tablet 2     [DISCONTINUED] cyclobenzaprine (FLEXERIL) 10 MG tablet Take 1 tablet (10 mg total) by mouth at bedtime as needed for muscle spasms. 15 tablet 0     [DISCONTINUED] diclofenac (VOLTAREN) 50 MG EC tablet Take 1 tablet (50 mg total) by mouth 2 (two) times a day. With food. 60 tablet 2     [DISCONTINUED] phenytoin (DILANTIN) 100 MG ER capsule TAKE TWO CAPSULES BY MOUTH TWICE DAILY  360 capsule 3     [DISCONTINUED] simvastatin (ZOCOR) 40 MG tablet TAKE 1 TABLET BY MOUTH AT BEDTIME FOR CHOLESTEROL 90 tablet 1     No facility-administered encounter medications on file as of 4/25/2018.      Past Medical History:   Diagnosis Date     Alcohol abuse      Back Strain     Created by Conversion      Epilepsy And Recurrent Seizures     Created by Conversion  Replacement Utility updated for latest IMO load     Hemorrhoids     Created by Conversion  Replacement Utility updated for latest IMO load     Hyperlipidemia     Created by Conversion      Osteoporosis     Created by Conversion  Replacement Utility updated for latest IMO load     Serum Enzyme Levels - Alkaline Phosphatase Elevated     Created by Conversion [facility] Annotation: Nov 26 2007 12:54PM - Katerin Beverly: Fred  10/05: nl;  Replacement Utility updated for latest IMO load     Past Surgical History:   Procedure Laterality Date     NH REMOVE TONSILS/ADENOIDS,<13 Y/O      Description: Tonsillectomy With Adenoidectomy;  Recorded: 11/26/2007;     NH VAG HYST, W/VAGINECTOMY      Description: Vaginal Hysterectomy With Colpectomy;  Recorded: 11/26/2007;     Social History   Substance Use Topics     Smoking status: Current Every Day Smoker     Packs/day: 1.00     Types: Cigarettes     Smokeless tobacco: Never Used     Alcohol use Yes      Comment: recovered alcoholic x 32 years        Objective / Physical Examination:  Vitals:    04/25/18 1521   BP: 126/66   Pulse: 70   Weight: 136 lb (61.7 kg)     Wt Readings from Last 3 Encounters:   04/25/18 136 lb  (61.7 kg)   03/29/18 136 lb 14.4 oz (62.1 kg)   12/26/17 138 lb 12.8 oz (63 kg)     Body mass index is 23.34 kg/(m^2).     General Appearance: Alert and oriented, cooperative, affect appropriate, speech clear, in no apparent distress  Lungs: Clear to auscultation bilaterally. Normal inspiratory and expiratory effort  Cardiovascular: Regular rate, normal S1, S2. No murmurs, rubs, or gallops  Abdomen: Bowel sounds active all four quadrants. Soft, non-tender  Extremities: Pulses 2+ and equal throughout. No edema  Neuro: Alert, forgetful. Follows commands appropriately      Orders Placed This Encounter   Procedures     Comprehensive Metabolic Panel     Lipid Profile     JIC LAV     HM2(CBC w/o Differential)   Followup: Return in about 6 months (around 10/25/2018) for Next scheduled follow up. earlier if needed.          Luz Cedillo, CNP
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
## 2021-06-18 NOTE — PATIENT INSTRUCTIONS - HE
Patient Instructions by Luz Cedillo FNP at 3/1/2021  3:20 PM     Author: Luz Cedillo FNP Service: -- Author Type: Nurse Practitioner    Filed: 3/1/2021  3:28 PM Encounter Date: 3/1/2021 Status: Signed    : Luz Cedillo FNP (Nurse Practitioner)       Patient Education     Ingrown Toenail  An ingrown toenail occurs when the nail grows sideways into the skin next to the nail. This can cause pain, especially when wearing tight shoes. It can also lead to an infection with redness, swelling, and pus drainage. Most people respond to the treatments described here. But sometimes surgery is needed. The big toe is most often affected.   The most common cause of an ingrown toenail is trimming your nails wrong. Most people trim the nails too close to the skin and try to round the nail too tightly around the shape of the toe. When you do this, the nail can grow into the skin of your toe. It is safer to trim the nail ending in a straight line rather than a curve.  Home care  The following guidelines will help you care for your toenail at home:    Soak the painful toe in warm water 3 to 4 times each day, for 10 to 20 minutes each time. Adding Epsom salt may be recommended by your healthcare provider. Wash the entire foot with an antibacterial soap. Then keep it dry.    If there is redness or swelling around the toenail, apply an antibiotic ointment 3 times a day.    Insert a small piece of rolled-up cotton under the corner of the nail. This helps the nail to grow outward, away from the cuticle.    Wear shoes that dont put pressure on the toes, such as a sandal or open shoe. Closed shoes should be big enough in the toes so that there is no pressure on the painful toe.    You may use acetaminophen or ibuprofen for pain, unless another pain medicine was prescribed. Talk with your healthcare provider before using these medicines if you have chronic liver or kidney disease. Also tell your provider if you  have ever had a stomach ulcer or GI (gastrointestinal) bleeding.  Prevention  The following tips will help you prevent ingrown toenails:    Avoid pointed, tight, or narrow shoes.    Trim toenails once a month so they dont grow too long. Cut the nail straight across.  Follow-up care  Follow up with your healthcare provider, or as advised.  When to seek medical advice  Call your healthcare provider right away if any of these occur:    Increasing redness, pain, or swelling of the toe    Tender red streaks in the skin leading toward the ankle    Pus or fluid drainage from the toe    Fever of 100.4 F (38 C) or higher, or as directed by your provider  Date Last Reviewed: 4/1/2017 2000-2017 [employer]. [address] [place] [address], [place] [address]. All rights reserved. This information is not intended as a substitute for professional medical care. Always follow your healthcare professional's instructions.
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
## 2021-06-19 NOTE — LETTER
Letter by Luz Cedillo FNP at      Author: Luz Cedillo FNP Service: -- Author Type: --    Filed:  Encounter Date: 9/19/2019 Status: (Other)         Kimberly Hernandez  [facility] [address]             September 19, 2019         Dear Ms. Hernandez,    Below are the results from your recent visit:    Resulted Orders   Hepatic Profile   Result Value Ref Range    Bilirubin, Total 0.3 0.0 - 1.0 mg/dL    Bilirubin, Direct 0.1 <=0.5 mg/dL    Protein, Total 6.9 6.0 - 8.0 g/dL    Albumin 4.1 3.5 - 5.0 g/dL    Alkaline Phosphatase 118 45 - 120 U/L    AST 20 0 - 40 U/L    ALT 13 0 - 45 U/L   Phenytoin (Dilantin )   Result Value Ref Range    Phenytoin 14.4 10.0 - 20.0 ug/mL   HM2(CBC w/o Differential)   Result Value Ref Range    WBC 6.0 4.0 - 11.0 thou/uL    RBC 4.42 3.80 - 5.40 mill/uL    Hemoglobin 15.1 12.0 - 16.0 g/dL    Hematocrit 43.6 35.0 - 47.0 %    MCV 99 80 - 100 fL    MCH 34.2 (H) 27.0 - 34.0 pg    MCHC 34.7 32.0 - 36.0 g/dL    RDW 11.2 11.0 - 14.5 %    Platelets 273 140 - 440 thou/uL    MPV 7.2 7.0 - 10.0 fL   Basic Metabolic Panel   Result Value Ref Range    Sodium 142 136 - 145 mmol/L    Potassium 4.7 3.5 - 5.0 mmol/L    Chloride 104 98 - 107 mmol/L    CO2 29 22 - 31 mmol/L    Anion Gap, Calculation 9 5 - 18 mmol/L    Glucose 64 (L) 70 - 125 mg/dL    Calcium 9.9 8.5 - 10.5 mg/dL    BUN 15 8 - 28 mg/dL    Creatinine 0.93 0.60 - 1.10 mg/dL    GFR MDRD Af Amer >60 >60 mL/min/1.73m2    GFR MDRD Non Af Amer 59 (L) >60 mL/min/1.73m2    Narrative    Fasting Glucose reference range is 70-99 mg/dL per  American Diabetes Association (ADA) guidelines.   Lipid Profile   Result Value Ref Range    Triglycerides 100 <=149 mg/dL    Cholesterol 195 <=199 mg/dL    LDL Calculated 93 <=129 mg/dL    HDL Cholesterol 82 >=50 mg/dL    Patient Fasting > 8hrs? No      Liver function labs are normal.     Dilantin level is normal.     Blood counts are normal    Blood sugar was low with this test (64) but  electrolytes were normal. Kidney function is stable.     Cholesterol levels are very good.     Please call with questions or contact us using MyChart.    Sincerely,        Electronically signed by HAIR Monteiro

## 2021-06-20 NOTE — LETTER
Letter by Jaren العراقي MD at      Author: Jaren العراقي MD Service: -- Author Type: --    Filed:  Encounter Date: 8/31/2020 Status: (Other)         Kimberly Hernandez  [address] [facility] [address]             August 31, 2020         Dear Ms. Hernandez,    Below are the results from your recent visit:    Resulted Orders   Phenytoin (Dilantin )   Result Value Ref Range    Phenytoin 22.8 (H) 10.0 - 20.0 ug/mL   Comprehensive Metabolic Panel   Result Value Ref Range    Sodium 144 136 - 145 mmol/L    Potassium 5.0 3.5 - 5.0 mmol/L    Chloride 106 98 - 107 mmol/L    CO2 29 22 - 31 mmol/L    Anion Gap, Calculation 9 5 - 18 mmol/L    Glucose 101 70 - 125 mg/dL    BUN 15 8 - 28 mg/dL    Creatinine 0.90 0.60 - 1.10 mg/dL    GFR MDRD Af Amer >60 >60 mL/min/1.73m2    GFR MDRD Non Af Amer >60 >60 mL/min/1.73m2    Bilirubin, Total 0.3 0.0 - 1.0 mg/dL    Calcium 10.1 8.5 - 10.5 mg/dL    Protein, Total 6.8 6.0 - 8.0 g/dL    Albumin 4.0 3.5 - 5.0 g/dL    Alkaline Phosphatase 116 45 - 120 U/L    AST 19 0 - 40 U/L    ALT 24 0 - 45 U/L    Narrative    Fasting Glucose reference range is 70-99 mg/dL per  American Diabetes Association (ADA) guidelines.   HM1 (CBC with Diff)   Result Value Ref Range    WBC 5.8 4.0 - 11.0 thou/uL    RBC 4.42 3.80 - 5.40 mill/uL    Hemoglobin 15.3 12.0 - 16.0 g/dL    Hematocrit 44.3 35.0 - 47.0 %     80 - 100 fL    MCH 34.6 (H) 27.0 - 34.0 pg    MCHC 34.5 32.0 - 36.0 g/dL    RDW 10.6 (L) 11.0 - 14.5 %    Platelets 225 140 - 440 thou/uL    MPV 7.2 7.0 - 10.0 fL    Neutrophils % 50 50 - 70 %    Lymphocytes % 40 20 - 40 %    Monocytes % 8 2 - 10 %    Eosinophils % 2 0 - 6 %    Basophils % 1 0 - 2 %    Neutrophils Absolute 2.9 2.0 - 7.7 thou/uL    Lymphocytes Absolute 2.3 0.8 - 4.4 thou/uL    Monocytes Absolute 0.4 0.0 - 0.9 thou/uL    Eosinophils Absolute 0.1 0.0 - 0.4 thou/uL    Basophils Absolute 0.0 0.0 - 0.2 thou/uL       Mildly elevated level.  However, may continue on the  current dosage.   All other labs normal and no changes.    Please call with questions or contact us using [employer].    Sincerely,        Electronically signed by Jaren العراقي MD

## 2021-06-20 NOTE — LETTER
Letter by Luz Cedillo FNP at      Author: Luz Cedillo FNP Service: -- Author Type: --    Filed:  Encounter Date: 3/12/2020 Status: (Other)         Kimberly Hernandez  [address] [facility] [address]             March 12, 2020         Dear Ms. Hernandez,    Below are the results from your recent visit:    Resulted Orders   Basic Metabolic Panel   Result Value Ref Range    Sodium 143 136 - 145 mmol/L    Potassium 4.6 3.5 - 5.0 mmol/L    Chloride 106 98 - 107 mmol/L    CO2 28 22 - 31 mmol/L    Anion Gap, Calculation 9 5 - 18 mmol/L    Glucose 98 70 - 125 mg/dL    Calcium 9.7 8.5 - 10.5 mg/dL    BUN 14 8 - 28 mg/dL    Creatinine 0.84 0.60 - 1.10 mg/dL    GFR MDRD Af Amer >60 >60 mL/min/1.73m2    GFR MDRD Non Af Amer >60 >60 mL/min/1.73m2    Narrative    Fasting Glucose reference range is 70-99 mg/dL per  American Diabetes Association (ADA) guidelines.   Lipid Profile   Result Value Ref Range    Triglycerides 81 <=149 mg/dL    Cholesterol 238 (H) <=199 mg/dL    LDL Calculated 136 (H) <=129 mg/dL    HDL Cholesterol 86 >=50 mg/dL    Patient Fasting > 8hrs? Yes      You should have received a phone call regarding these results. Your cholesterol is elevated and I recommend that you take a statin cholesterol medication for this. I would like to have you start taking atorvastatin 20 mg daily which I believe you took previously. I sent this to your pharmacy.     The electrolyte panel and kidney function labs are normal.      Please call with questions or contact us using [employer].    Sincerely,        Electronically signed by HAIR Monteiro

## 2021-06-20 NOTE — LETTER
Letter by Sylvia Ascencio CNP at      Author: Sylvia Ascencio CNP Service: -- Author Type: --    Filed:  Encounter Date: 3/13/2020 Status: (Other)         March 13, 2020     Patient: Kimberly Hernandez   YOB: 1945   Date of Visit: 3/13/2020       To Whom it May Concern:    Kimberly Hernandez has a stainless steel brain aneurysm clip that is ferromagnetic and may therefore may set off a metal detector.         Electronically signed by Sylvia Ascencio CNP

## 2021-06-21 ENCOUNTER — COMMUNICATION - HEALTHEAST (OUTPATIENT)
Dept: INTERNAL MEDICINE | Facility: CLINIC | Age: 76
End: 2021-06-21

## 2021-06-21 DIAGNOSIS — I10 BENIGN ESSENTIAL HYPERTENSION: ICD-10-CM

## 2021-06-21 NOTE — LETTER
Letter by Luz Cedillo FNP at      Author: Luz Cedillo FNP Service: -- Author Type: --    Filed:  Encounter Date: 1/22/2021 Status: (Other)         Kimberly Hernandez  [address] [facility] [address]             January 22, 2021         Dear Ms. Hernandez,    Below are the results from your recent visit:    Resulted Orders   Mammo Screening Bilateral    Narrative    BILATERAL FULL FIELD DIGITAL SCREENING MAMMOGRAM    Performed on: 1/22/21      Compared to: 02/18/2019 Mammo Screening Bilateral, 10/27/2016 Mammo   Screening Bilateral, 03/05/2014 Mammo Screening Bilateral, and 02/27/2013   Mammo Screening Bilateral    Findings: The breasts have scattered areas of fibroglandular densities.   There is no radiographic evidence of malignancy. This study was evaluated   with the assistance of Computer-Aided Detection. Continue routine   screening mammogram as recommended.    There are benign appearing calcifications in the left and right breasts.    ACR BI-RADS Category 2: Benign     Mammogram results are normal.     Please call with questions or contact us using [employer].    Sincerely,        Electronically signed by HAIR Monteiro

## 2021-06-21 NOTE — LETTER
Letter by Luz Ceidllo FNP at      Author: Luz Cedillo FNP Service: -- Author Type: --    Filed:  Encounter Date: 11/2/2020 Status: (Other)       Kimberly Hernandez  [address] [facility] [address]      11/09/20      Dear Kimberly,      In reviewing your records, we have determined a medication check is needed before your next refill, please call the [facility] to schedule an appointment.      Medication check/review      We have made attempts to call you for an appointment, please verify your contact information is correct when calling back for an appointment, or if you have transferred your care to another clinic, please contact us so we can update our records.     Please call [phone] to schedule an appointment.    We believe that a strong preventative care program, including regular physicals and follow-up care is an important part of a healthy lifestyle and we are committed to helping you maintain your health.    Thank you for choosing us as your health care provider.    Sincerely,    Sydnee Lawrence CMA - CMT/CA  [facility]  [address] [address] [phone]

## 2021-06-21 NOTE — LETTER
Letter by Luz Cedillo FNP at      Author: Luz Cedillo FNP Service: -- Author Type: --    Filed:  Encounter Date: 11/24/2020 Status: (Other)         Kimberly Hernandez  [address] [facility] [address]             November 24, 2020         Dear Ms. Hernandez,    Below are the results from your recent visit:    Resulted Orders   Phenytoin (Dilantin )   Result Value Ref Range    Phenytoin 20.3 (H) 10.0 - 20.0 ug/mL     Your phenytoin level is just over the upper limit of normal but improved compared to the last check. Continue with 2 phenytoin in the morning and 1 in the evening.     Please call with questions or contact us using [employer].    Sincerely,        Electronically signed by HAIR Monteiro
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
## 2021-06-22 RX ORDER — HYDROCHLOROTHIAZIDE 25 MG/1
25 TABLET ORAL DAILY
Qty: 90 TABLET | Refills: 1 | Status: SHIPPED | OUTPATIENT
Start: 2021-06-22 | End: 2021-11-15

## 2021-06-22 NOTE — ANESTHESIA POSTPROCEDURE EVALUATION
Patient: Kimberly Hernandez  * No procedures listed *  Anesthesia type: general    Patient location: PACU  Last vitals:   Vitals:    12/14/18 1515   BP: 137/63   Pulse:    Resp:    Temp:    SpO2:      Post vital signs: stable  Level of consciousness: awake and responds to simple questions  Post-anesthesia pain: pain controlled  Post-anesthesia nausea and vomiting: no  Pulmonary: unassisted, return to baseline  Cardiovascular: stable and blood pressure at baseline  Hydration: adequate  Anesthetic events: no    QCDR Measures:  ASA# 11 - Kendy-op Cardiac Arrest: ASA11B - Patient did NOT experience unanticipated cardiac arrest  ASA# 12 - Kendy-op Mortality Rate: ASA12B - Patient did NOT die  ASA# 13 - PACU Re-Intubation Rate: ASA13B - Patient did NOT require a new airway mgmt  ASA# 10 - Composite Anes Safety: ASA10A - No serious adverse event    Additional Notes:

## 2021-06-22 NOTE — ANESTHESIA PREPROCEDURE EVALUATION
Anesthesia Evaluation      Patient summary reviewed   No history of anesthetic complications     Airway   Mallampati: II  Neck ROM: full   Pulmonary - normal exam    breath sounds clear to auscultation  (+) a smoker  (-) COPD, asthma, shortness of breath, sleep apnea                         Cardiovascular - normal exam  Exercise tolerance: > or = 4 METS  (+) , hypercholesterolemia,     (-) angina  Rhythm: regular  Rate: normal,         Neuro/Psych    (+) seizures,     Comments: ETOH abuse  Cerebral aneurysm repair  Cognitive decline  No seizures for 20+ years  Cognitive decline    Endo/Other    (+) arthritis,   (-) no diabetes, hypothyroidism, hyperthyroidism, no obesity, not pregnant     Comments: Osteoporosis      GI/Hepatic/Renal - negative ROS           Dental    (+) upper dentures and lower dentures                       Anesthesia Plan  Planned anesthetic: general endotracheal    ASA 3   Induction: intravenous   Anesthetic plan and risks discussed with: patient, sibling and child/children  Anesthesia plan special considerations: antiemetics, IV therapy two IVs,   Post-op plan: routine recovery
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
## 2021-06-22 NOTE — ANESTHESIA CARE TRANSFER NOTE
Last vitals:   Vitals:    12/14/18 1450   BP: 139/42   Pulse: 85   Resp: 14   Temp: 36.2  C (97.2  F)   SpO2: 100%     Patient's level of consciousness is drowsy  Spontaneous respirations: yes  Maintains airway independently: yes  Dentition unchanged: yes  Oropharynx: oropharynx clear of all foreign objects    QCDR Measures:  ASA# 20 - Surgical Safety Checklist: WHO surgical safety checklist completed prior to induction    PQRS# 430 - Adult PONV Prevention: 4558F - Pt received => 2 anti-emetic agents (different classes) preop & intraop  ASA# 8 - Peds PONV Prevention: NA - Not pediatric patient, not GA or 2 or more risk factors NOT present  PQRS# 424 - Kendy-op Temp Management: 4559F - At least one body temp DOCUMENTED => 35.5C or 95.9F within required timeframe  PQRS# 426 - PACU Transfer Protocol: - Transfer of care checklist used  ASA# 14 - Acute Post-op Pain: ASA14B - Patient did NOT experience pain >= 7 out of 10   Breathing spontaneously with adequate tidal volume, patient opens eyes to name, oral pharynx suctioned, and ETT removed with good air exchange.
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
## 2021-06-23 NOTE — PATIENT INSTRUCTIONS - HE
- You will get a call to schedule the mammogram and bone density  - Plan your trip to [place]!   - Contact your neurologist regarding your Dilantin level which was high with the last check (29.9 12/2018). Dr. Pruitt: [phone]

## 2021-06-25 NOTE — PROGRESS NOTES
Progress Notes by Ivania Krishna PA-C at 2/23/2017 12:10 PM     Author: Ivania Krishna PA-C Service: -- Author Type: Physician Assistant    Filed: 2/23/2017  1:19 PM Encounter Date: 2/23/2017 Status: Signed    : Ivania Krishna PA-C (Physician Assistant)       Subjective:      Patient ID: Kimberly Hernandez is a 71 y.o. female.    Chief Complaint:    HPI Kimberly Hernandez is a 71 y.o. female who presents today complaining of rash on her legs on armx 2 months. Denies outdoor activities such as gardening, new soaps or chemicals, and recent travel. Patient has not tried any topical creams. Skin is pruritic at night.  The rash began on the right leg and began spreading to the arms about 1 week ago. Denies any autoimmune disorders. Denies sick symptoms such as fever, or cough, or chills. The rash is dry, red and scalling, and has not changed since it appeared.       Past Medical History:   Diagnosis Date   ? Alcohol abuse    ? Back Strain     Created by Conversion    ? Epilepsy And Recurrent Seizures     Created by Conversion  Replacement Utility updated for latest IMO load   ? Hemorrhoids     Created by Conversion  Replacement Utility updated for latest IMO load   ? Hyperlipidemia     Created by Conversion    ? Osteoporosis     Created by Conversion  Replacement Utility updated for latest IMO load   ? Serum Enzyme Levels - Alkaline Phosphatase Elevated     Created by Conversion [facility] Annotation: Nov 26 2007 12:54PM - Katerin Beverly: Fred  10/05: nl;  Replacement Utility updated for latest IMO load       Past Surgical History:   Procedure Laterality Date   ? RI REMOVE TONSILS/ADENOIDS,<11 Y/O      Description: Tonsillectomy With Adenoidectomy;  Recorded: 11/26/2007;   ? RI VAG HYST, W/VAGINECTOMY      Description: Vaginal Hysterectomy With Colpectomy;  Recorded: 11/26/2007;       Family History   Problem Relation Age of Onset   ? No Medical Problems Mother    ? No Medical Problems Father    ? Breast cancer Neg  Hx        Social History   Substance Use Topics   ? Smoking status: Former Smoker     Packs/day: 1.00     Types: Cigarettes     Quit date: 10/29/2016   ? Smokeless tobacco: None   ? Alcohol use Yes      Comment: recovered alcoholic x 32 years        Review of Systems   Constitutional: Negative for appetite change, chills and fever.   HENT: Negative for sore throat.    Respiratory: Negative for cough.    Skin: Positive for rash. Negative for wound.   Allergic/Immunologic: Negative for environmental allergies, food allergies and immunocompromised state.       Objective:     Visit Vitals   ? /70   ? Pulse 73   ? Temp 97.7  F (36.5  C) (Oral)   ? Wt 152 lb 11.2 oz (69.3 kg)   ? SpO2 99%   ? BMI 26.21 kg/m2       Physical Exam   Constitutional: She appears well-developed and well-nourished. No distress.   Skin: Rash noted.        See diagram for rash locations  Rash is dry erythematous pruritic raised. Annular without central clearing on left arm and geographically shaped on right leg. Rash is non blanching without petechia. Excoriations present             Procedures    Recent Results (from the past 24 hour(s))   TIFFANIE Prep   Result Value Ref Range    KOH Prep No Yeast or Fungal Elements Seen No Yeast or Fungal Elements Seen   TIFFANIE Prep   Result Value Ref Range    KOH Prep No Yeast or Fungal Elements Seen No Yeast or Fungal Elements Seen       Assessment / Plan:     1. Rash and nonspecific skin eruption  KOH Prep    KOH Prep         Patient Instructions   1. Apply cream to affected area 2-4 times daily.  2. Follow up in two weeks with primary care.   3. Return if symptoms worsen or do not improve in 2 weeks.  4. May take benadryl at night to help decrease itch and help sleep.
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
## 2021-06-26 NOTE — PATIENT INSTRUCTIONS - HE
- We will do labs today for cognitive decline. I also recommend that you have neuropsychology testing done. Once this is completed, I will refer you back to neurology to review   - Check with insurance to see if Shingrix is covered- let me know at your next visit     The new shingles shot (Shingrix) is 2 separate shots  by 2-6 months.    The cost out of pocket is around $390 for the 2 shots, so you might want to see if your insurance covers it or a portion of it prior to having it done.  Often by having it done at a pharmacy rather than a clinic, it can be cheaper for you. I recommend that you check on the cost through your insurance or talk to your pharmacist about the cost of the vaccine.     It is estimated that any person's risk of shingles over their lifetime is around 10-20%.    The old shingles vaccine (Zostavax) is about 50% effective, reducing your risk of shingles to about 5-10% over your lifetime.    The new shot is 90% effective, reducing your risk of shingles to about 1-2% over your lifetime.    Because the shot is strong, it is very common to have flu like symptoms for 2-3 days after the shot.  25-50% of patients will have fever, muscle aches or headache.
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
## 2021-07-04 NOTE — ADDENDUM NOTE
Addendum Note by Luz Cedillo FNP at 3/16/2021 10:40 AM     Author: Luz Cedillo FNP Service: -- Author Type: Nurse Practitioner    Filed: 3/17/2021  9:46 AM Encounter Date: 3/16/2021 Status: Signed    : Luz Cedillo FNP (Nurse Practitioner)    Addended by: LUZ CEDILLO on: 3/17/2021 09:46 AM        Modules accepted: Orders

## 2021-07-04 NOTE — LETTER
Letter by Luz Cedillo FNP at      Author: Luz Cdeillo FNP Service: -- Author Type: --    Filed:  Encounter Date: 6/3/2021 Status: (Other)         Kimberly Hernandez  [address] [facility] [id number]             Trixie 3, 2021         Dear Ms. Hernandez,    Below are the results from your recent visit:    Resulted Orders   Phenytoin (Dilantin )   Result Value Ref Range    Phenytoin 10.5 10.0 - 20.0 ug/mL   Comprehensive Metabolic Panel   Result Value Ref Range    Sodium 143 136 - 145 mmol/L    Potassium 5.2 (H) 3.5 - 5.0 mmol/L    Chloride 107 98 - 107 mmol/L    CO2 27 22 - 31 mmol/L    Anion Gap, Calculation 9 5 - 18 mmol/L    Glucose 96 70 - 125 mg/dL    BUN 17 8 - 28 mg/dL    Creatinine 0.83 0.60 - 1.10 mg/dL    GFR MDRD Af Amer >60 >60 mL/min/1.73m2    GFR MDRD Non Af Amer >60 >60 mL/min/1.73m2    Bilirubin, Total 0.3 0.0 - 1.0 mg/dL    Calcium 9.3 8.5 - 10.5 mg/dL    Protein, Total 6.5 6.0 - 8.0 g/dL    Albumin 3.9 3.5 - 5.0 g/dL    Alkaline Phosphatase 122 (H) 45 - 120 U/L    AST 19 0 - 40 U/L    ALT 17 0 - 45 U/L    Narrative    Fasting Glucose reference range is 70-99 mg/dL per  American Diabetes Association (ADA) guidelines.   Syphilis Screen, Cascade   Result Value Ref Range    Treponema Antibody (Syphilis) Negative Negative   Vitamin B12   Result Value Ref Range    Vitamin B-12 809 213 - 816 pg/mL   Folate, Serum   Result Value Ref Range    Folate 17.1 >=3.5 ng/mL   HM2(CBC w/o Differential)   Result Value Ref Range    WBC 6.1 4.0 - 11.0 thou/uL    RBC 3.78 (L) 3.80 - 5.40 mill/uL    Hemoglobin 12.6 12.0 - 16.0 g/dL    Hematocrit 38.6 35.0 - 47.0 %     (H) 80 - 100 fL    MCH 33.3 27.0 - 34.0 pg    MCHC 32.6 32.0 - 36.0 g/dL    RDW 13.1 11.0 - 14.5 %    Platelets 221 140 - 440 thou/uL    MPV 9.3 7.0 - 10.0 fL     Labs look good. No  Underlying/additional causes for memory changes. Continue with the plan to do neuropsychology evaluation for memory.     Please call with questions or  contact us using Viki.    Sincerely,        Electronically signed by HAIR Monteiro

## 2021-07-06 VITALS
BODY MASS INDEX: 21.28 KG/M2 | SYSTOLIC BLOOD PRESSURE: 136 MMHG | HEART RATE: 72 BPM | WEIGHT: 124 LBS | DIASTOLIC BLOOD PRESSURE: 68 MMHG

## 2021-07-13 ENCOUNTER — RECORDS - HEALTHEAST (OUTPATIENT)
Dept: ADMINISTRATIVE | Facility: CLINIC | Age: 76
End: 2021-07-13

## 2021-07-21 ENCOUNTER — RECORDS - HEALTHEAST (OUTPATIENT)
Dept: ADMINISTRATIVE | Facility: CLINIC | Age: 76
End: 2021-07-21

## 2021-09-20 RX ORDER — PHENYTOIN SODIUM 100 MG/1
CAPSULE, EXTENDED RELEASE ORAL
Qty: 270 CAPSULE | OUTPATIENT
Start: 2021-09-20

## 2021-11-15 ENCOUNTER — OFFICE VISIT (OUTPATIENT)
Dept: INTERNAL MEDICINE | Facility: CLINIC | Age: 76
End: 2021-11-15
Payer: COMMERCIAL

## 2021-11-15 VITALS
SYSTOLIC BLOOD PRESSURE: 126 MMHG | OXYGEN SATURATION: 100 % | WEIGHT: 128.1 LBS | DIASTOLIC BLOOD PRESSURE: 62 MMHG | HEART RATE: 76 BPM | BODY MASS INDEX: 21.87 KG/M2 | HEIGHT: 64 IN

## 2021-11-15 DIAGNOSIS — Z23 COVID-19 VACCINE ADMINISTERED: ICD-10-CM

## 2021-11-15 DIAGNOSIS — L82.0 INFLAMED SEBORRHEIC KERATOSIS: ICD-10-CM

## 2021-11-15 DIAGNOSIS — R42 VERTIGO: ICD-10-CM

## 2021-11-15 DIAGNOSIS — R42 DIZZINESS: Primary | ICD-10-CM

## 2021-11-15 LAB
ERYTHROCYTE [DISTWIDTH] IN BLOOD BY AUTOMATED COUNT: 13.7 % (ref 10–15)
HCT VFR BLD AUTO: 45.8 % (ref 35–47)
HGB BLD-MCNC: 14.7 G/DL (ref 11.7–15.7)
MCH RBC QN AUTO: 32.4 PG (ref 26.5–33)
MCHC RBC AUTO-ENTMCNC: 32.1 G/DL (ref 31.5–36.5)
MCV RBC AUTO: 101 FL (ref 78–100)
PLATELET # BLD AUTO: 282 10E3/UL (ref 150–450)
RBC # BLD AUTO: 4.54 10E6/UL (ref 3.8–5.2)
WBC # BLD AUTO: 7.3 10E3/UL (ref 4–11)

## 2021-11-15 PROCEDURE — 90662 IIV NO PRSV INCREASED AG IM: CPT | Performed by: NURSE PRACTITIONER

## 2021-11-15 PROCEDURE — 93005 ELECTROCARDIOGRAM TRACING: CPT | Performed by: NURSE PRACTITIONER

## 2021-11-15 PROCEDURE — 85027 COMPLETE CBC AUTOMATED: CPT | Performed by: NURSE PRACTITIONER

## 2021-11-15 PROCEDURE — 99214 OFFICE O/P EST MOD 30 MIN: CPT | Mod: 25 | Performed by: NURSE PRACTITIONER

## 2021-11-15 PROCEDURE — 36415 COLL VENOUS BLD VENIPUNCTURE: CPT | Performed by: NURSE PRACTITIONER

## 2021-11-15 PROCEDURE — 91306 COVID-19,PF,MODERNA (18+ YRS BOOSTER .25ML): CPT | Performed by: NURSE PRACTITIONER

## 2021-11-15 PROCEDURE — 80185 ASSAY OF PHENYTOIN TOTAL: CPT | Performed by: NURSE PRACTITIONER

## 2021-11-15 PROCEDURE — 93010 ELECTROCARDIOGRAM REPORT: CPT | Performed by: INTERNAL MEDICINE

## 2021-11-15 PROCEDURE — 0064A COVID-19,PF,MODERNA (18+ YRS BOOSTER .25ML): CPT | Performed by: NURSE PRACTITIONER

## 2021-11-15 PROCEDURE — G0008 ADMIN INFLUENZA VIRUS VAC: HCPCS | Performed by: NURSE PRACTITIONER

## 2021-11-15 ASSESSMENT — MIFFLIN-ST. JEOR: SCORE: 1061.06

## 2021-11-15 NOTE — LETTER
November 16, 2021      Kimberly Hernadnez  [address] [facility] [address]        Dear ,    We are writing to inform you of your test results.    Your Dilantin level is not elevated, however it is below the usual therapeutic range.  At this time not going to suggest any changes but we should check this again in another 3 months.    Blood count is normal, no anemia.    Resulted Orders   Phenytoin level   Result Value Ref Range    Phenytoin 8.7   ug/mL      Comment:        Therapeutic Range: 10.0-20.0 ug/mL  Critical: Greater than 25.0 ug/mL   CBC with platelets   Result Value Ref Range    WBC Count 7.3 4.0 - 11.0 10e3/uL    RBC Count 4.54 3.80 - 5.20 10e6/uL    Hemoglobin 14.7 11.7 - 15.7 g/dL    Hematocrit 45.8 35.0 - 47.0 %     (H) 78 - 100 fL    MCH 32.4 26.5 - 33.0 pg    MCHC 32.1 31.5 - 36.5 g/dL    RDW 13.7 10.0 - 15.0 %    Platelet Count 282 150 - 450 10e3/uL       If you have any questions or concerns, please call the clinic at the number listed above.       Sincerely,      Luz Cedillo NP

## 2021-11-15 NOTE — PROGRESS NOTES
Internal Medicine Office Visit  [facility]   Patient Name: Kimberly Hernandez  Patient Age: 76 year old  YOB: 1945  MRN: [id number]    Date of Visit: 11/15/2021  Patient presents with:  Dizziness: dizziness-check moles on chest-covid shot/pneumo shot/ flu shot questions           Assessment / Plan / Medical Decision Making:    Problem List Items Addressed This Visit     None      Visit Diagnoses     Dizziness    -  Primary    Relevant Orders    EKG 12-lead, tracing only (Completed)    Phenytoin level (Completed)    CBC with platelets (Completed)    Comprehensive metabolic panel    Inflamed seborrheic keratosis        Relevant Orders    Adult Dermatology Referral    Vertigo        Relevant Orders    Occupational Therapy Referral    COVID-19 vaccine administered        Relevant Orders    COVID-19,PF,MODERNA (18+ YRS BOOSTER .25ML) (Completed)         - She has a difficult time explaining the dizziness episodes, some episodes fell like she is going to pass out. EKG today shows NSR with frequent PVC and nonspecific ST abnormality which is a change from the last EKG in 2018. She has cardiac risk factors of HLD and ongoing tobacco use. She will be referred for a stress ECHO, she is able to walk on a treadmill  - If labs and imaging is normal, she will proceed with vestibular therapy through OT     I am having Kimberly Hernandez maintain her multivitamin therapeutic (THERAGRAN) tablet, calcium, (ERGOCALCIFEROL, VITAMIN D2, (VITAMIN D2 ORAL)), aspirin, hypromellose, phenytoin, phenytoin, and hydrochlorothiazide.          Orders Placed This Encounter   Procedures     COVID-19,PF,MODERNA (18+ YRS BOOSTER .25ML)     SC FLU VACCINE, INCREASED ANTIGEN, PRESV FREE     Phenytoin level     CBC with platelets     Comprehensive metabolic panel     Adult Dermatology Referral     Occupational Therapy Referral     EKG 12-lead, tracing only   Followup: Return in about 3 months (around 2/15/2022) for  Follow up. earlier if needed.        Luz Cedillo, DNP, APRN, CNP           HPI:  Kimberly Hernandez is a 76 year old year old who presents to the office today for dizziness. Symptoms started about 2 months ago. If she is lying or sitting and stands up she gets a feeling that she is moving or unsteady and then this resolves within about 2 minutes. Once she was at a restaurant with friends and when she stood up she was so dizzy she fell and landed in a friend's lap; she did not lose consciousness. She does feel like she could pass out with some of the episodes. After a few minutes, the feeling passes. She also notices this feeling when she turns her head. No chest pain or SOB when it occurs. It does not happen when she is sitting or standing. She has about 4 cups of water per day, she knows she needs to drink more.     H/o epilepsy. No recent seizures.     Answers for HPI/ROS submitted by the patient on 11/15/2021  How many servings of fruits and vegetables do you eat daily?: 0-1  On average, how many sweetened beverages do you drink each day (Examples: soda, juice, sweet tea, etc.  Do NOT count diet or artificially sweetened beverages)?: 0  How many minutes a day do you exercise enough to make your heart beat faster?: 10 to 19  How many days a week do you exercise enough to make your heart beat faster?: 5  How many days per week do you miss taking your medication?: 0      Health Maintenance Review  Health Maintenance   Topic Date Due     ANNUAL REVIEW OF HM ORDERS  Never done     ADVANCE CARE PLANNING  Never done     ZOSTER IMMUNIZATION (2 of 3) 09/02/2010     MEDICARE ANNUAL WELLNESS VISIT  Never done     LUNG CANCER SCREENING  02/23/2013     FALL RISK ASSESSMENT  11/15/2022     MAMMO SCREENING  01/22/2023     DTAP/TDAP/TD IMMUNIZATION (2 - Td or Tdap) 07/15/2024     LIPID  03/11/2025     COLORECTAL CANCER SCREENING  10/25/2026     DEXA  09/30/2034     HEPATITIS C SCREENING  Completed     PHQ-2  Completed     INFLUENZA  VACCINE  Completed     Pneumococcal Vaccine: 65+ Years  Completed     COVID-19 Vaccine  Completed     IPV IMMUNIZATION  Aged Out     MENINGITIS IMMUNIZATION  Aged Out     HEPATITIS B IMMUNIZATION  Aged Out       Current Scheduled Meds:  Outpatient Encounter Medications as of 11/15/2021   Medication Sig Dispense Refill     artificial tears,hypromellose, (PURE & GENTLE) 0.3 % Drop ophthalmic drops [ARTIFICIAL TEARS,HYPROMELLOSE, (PURE & GENTLE) 0.3 % DROP OPHTHALMIC DROPS] Administer 2 drops to both eyes 4 (four) times a day as needed. 30 mL 11     aspirin 81 MG EC tablet Take 1 tablet by mouth daily       calcium carbonate (OS-EULAILA) 600 mg (1,500 mg) tablet [CALCIUM CARBONATE (OS-EULALIA) 600 MG (1,500 MG) TABLET] Take 600 mg by mouth 2 (two) times a day with meals.       ERGOCALCIFEROL, VITAMIN D2, (VITAMIN D2 ORAL) [ERGOCALCIFEROL, VITAMIN D2, (VITAMIN D2 ORAL)] Take 2 tablets by mouth daily.       hydroCHLOROthiazide (HYDRODIURIL) 25 MG tablet [HYDROCHLOROTHIAZIDE (HYDRODIURIL) 25 MG TABLET] Take 1 tablet (25 mg total) by mouth daily. 90 tablet 1     multivitamin therapeutic (THERAGRAN) tablet [MULTIVITAMIN THERAPEUTIC (THERAGRAN) TABLET] Take 1 tablet by mouth daily.       phenytoin (DILANTIN) 100 MG ER capsule [PHENYTOIN (DILANTIN) 100 MG ER CAPSULE] Take 2 capsules (200 mg total) by mouth every morning AND 1 capsule (100 mg total) every evening.  1     phenytoin (DILANTIN) 100 MG ER capsule [PHENYTOIN (DILANTIN) 100 MG ER CAPSULE] Take 2 capsules (200 mg total) by mouth every morning AND 1 capsule (100 mg total) every evening. (Patient not taking: Reported on 11/15/2021)  1     [DISCONTINUED] hydroCHLOROthiazide (HYDRODIURIL) 25 MG tablet [HYDROCHLOROTHIAZIDE (HYDRODIURIL) 25 MG TABLET] Take 1 tablet (25 mg total) by mouth daily. (Patient not taking: Reported on 11/15/2021) 90 tablet 1     [DISCONTINUED] phenytoin (DILANTIN) 100 MG capsule TAKE 2 CAPSULES BY MOUTH EVERY MORNING AND 1 CAPSULE EVERY EVENING (Patient  "not taking: Reported on 11/15/2021) 270 capsule 1     [DISCONTINUED] phenytoin (DILANTIN) 100 MG ER capsule [PHENYTOIN (DILANTIN) 100 MG ER CAPSULE] Take 2 capsules (200 mg total) by mouth every morning AND 1 capsule (100 mg total) every evening. (Patient not taking: Reported on 11/15/2021) 270 capsule      [DISCONTINUED] varenicline (CHANTIX STARTING MONTH BOX) 0.5 mg (11)- 1 mg (42) tablet [VARENICLINE (CHANTIX STARTING MONTH BOX) 0.5 MG (11)- 1 MG (42) TABLET] 1 wk before you stop smoking take 0.5mg daily on days 1-3, 0.5mg 2 times each day on days 4-7, then 1mg 2 times daily. (Patient not taking: Reported on 11/15/2021) 53 tablet 0     [DISCONTINUED] varenicline (CHANTIX) 1 mg tablet [VARENICLINE (CHANTIX) 1 MG TABLET] Take 1 tab by mouth two times a day. Take after eating with a full glass of water. NOTE: Dispense as maintenance for refills only. (Patient not taking: Reported on 11/15/2021) 60 tablet 2     No facility-administered encounter medications on file as of 11/15/2021.         Objective / Physical Examination:  Vitals:    11/15/21 1603   BP: 126/62   BP Location: Right arm   Patient Position: Sitting   Cuff Size: Adult Regular   Pulse: 76   SpO2: 100%   Weight: 58.1 kg (128 lb 1.6 oz)   Height: 1.626 m (5' 4\")     Standing BP: 140/70       Wt Readings from Last 3 Encounters:   11/15/21 58.1 kg (128 lb 1.6 oz)   06/01/21 56.2 kg (124 lb)   03/23/21 54.8 kg (120 lb 12.8 oz)         Body mass index is 21.99 kg/m .     Constitutional: In no apparent distress  Eyes: PERRL, Conjunctivae clear. Non-icteric.   ENT: Tympanic membrane clear with landmarks well visualized bilaterally  Respiratory: Clear to auscultation bilaterally. Normal inspiratory and expiratory effort  Cardiovascular: Regular rate and rhythm. No murmurs, rubs, or gallops. No edema. No carotid bruits.   Skin: Large seborrheic keratoses on the chest   Neuro: Normal gait.  CN1: grossly intact per patient recall. CN2: No funduscopic exam " performed. CN3,4 & 6: Pupillary light response, lateral and vertical gaze normal.  No nystagmus.  Visual fields are full to confrontation. CN5: Intact to touch CN7: No facial weakness, smile, facial symmetry intact. CN8: Intact to spoken voice. CN9&10: Gag reflex, uvula midline, palate rises with phonation. CN11: Shoulder shrug 5/5 intact bilaterally. CN12: Tongue midline and moves freely from side to side.  Psych: Alert and oriented x3.

## 2021-11-16 LAB
ATRIAL RATE - MUSE: 75 BPM
DIASTOLIC BLOOD PRESSURE - MUSE: NORMAL MMHG
INTERPRETATION ECG - MUSE: NORMAL
P AXIS - MUSE: 64 DEGREES
PHENYTOIN SERPL-MCNC: 8.7 UG/ML
PR INTERVAL - MUSE: 116 MS
QRS DURATION - MUSE: 86 MS
QT - MUSE: 382 MS
QTC - MUSE: 426 MS
R AXIS - MUSE: 28 DEGREES
SYSTOLIC BLOOD PRESSURE - MUSE: NORMAL MMHG
T AXIS - MUSE: 20 DEGREES
VENTRICULAR RATE- MUSE: 75 BPM

## 2021-11-17 ENCOUNTER — TELEPHONE (OUTPATIENT)
Dept: INTERNAL MEDICINE | Facility: CLINIC | Age: 76
End: 2021-11-17
Payer: COMMERCIAL

## 2021-11-17 DIAGNOSIS — R94.31 NONSPECIFIC ABNORMAL ELECTROCARDIOGRAM (ECG) (EKG): Primary | ICD-10-CM

## 2021-11-17 DIAGNOSIS — R42 DIZZINESS: ICD-10-CM

## 2021-11-17 NOTE — TELEPHONE ENCOUNTER
Called pt- she had nonspecific EKG findings and ST depression in anterior leads. Due to EKG changes and dizziness, I recommended that she undergo stress test. She is able to walk on a treadmill, will order stress ECHO

## 2021-12-28 DIAGNOSIS — G40.909 NONINTRACTABLE EPILEPSY WITHOUT STATUS EPILEPTICUS, UNSPECIFIED EPILEPSY TYPE (H): ICD-10-CM

## 2021-12-30 RX ORDER — PHENYTOIN SODIUM 100 MG/1
CAPSULE, EXTENDED RELEASE ORAL
Qty: 270 CAPSULE | Refills: 1 | Status: SHIPPED | OUTPATIENT
Start: 2021-12-30 | End: 2022-06-29

## 2021-12-30 NOTE — TELEPHONE ENCOUNTER
"  Outpatient Medication Detail     Disp Refills Start End ALIE   phenytoin (DILANTIN) 100 MG ER capsule 270 capsule 1 2/11/2021  No   Sig - Route: Take 2 capsules (200 mg total) by mouth every morning AND 1 capsule (100 mg total) every evening. - Oral   Sent to pharmacy as: phenytoin sodium extended 100 mg capsule (DILANTIN)   E-Prescribing Status: Receipt confirmed by pharmacy (2/11/2021  1:12 PM CST)       phenytoin (DILANTIN) 100 MG ER capsule [[id number]]    Electronically signed by: Luz Cedillo FNP on 02/11/21 1312 Status: Active   Ordering user: Luz Cedillo FNP 02/11/21 1312 Authorized by: Luz Cedillo FNP   Frequency:  02/11/21 - Until Discontinued Released by: Luz Cedillo FNP 02/11/21 1312   Diagnoses  Epilepsy (H) [G40.909]     Routing refill request to provider for review/approval because:  Drug level  A break in medication    Last office visit provider:  11/15/21     Requested Prescriptions   Pending Prescriptions Disp Refills     phenytoin (DILANTIN) 100 MG capsule [Pharmacy Med Name: PHENYTOIN SODIUM 100MG EXT CAPSULES] 270 capsule 1     Sig: TAKE 2 CAPSULES BY MOUTH EVERY MORNING AND 1 CAPSULE EVERY EVENING.       Anti-Seizure Meds Protocol  Failed - 12/28/2021  9:22 AM        Failed - Review Authorizing provider's last note.      Refer to last progress notes: confirm request is for original authorizing provider (cannot be through other providers).          Failed - Dilantin level within therapeutic range in past 26 months     No lab results found.    Dilantin level must be checked 2-4 weeks after dosage change.          Passed - Recent (12 mo) or future (30 days) visit within the authorizing provider's specialty     Patient has had an office visit with the authorizing provider or a provider within the authorizing providers department within the previous 12 mos or has a future within next 30 days. See \"Patient Info\" tab in inbasket, or \"Choose Columns\" in Meds & Orders " section of the refill encounter.              Passed - Normal CBC on file in past 26 months     Recent Labs   Lab Test 11/15/21  1740   WBC 7.3   RBC 4.54   HGB 14.7   HCT 45.8                    Passed - Normal ALT or AST on file in past 26 months     Recent Labs   Lab Test 06/01/21  1238   ALT 17     Recent Labs   Lab Test 06/01/21  1238   AST 19             Passed - Normal platelet count on file in past 26 months     Recent Labs   Lab Test 11/15/21  1740                  Passed - Medication is active on med list        Passed - No active pregnancy on record        Passed - No positive pregnancy test in last 12 months             Art Collazo, RN 12/30/21 10:26 AM

## 2022-02-21 ENCOUNTER — OFFICE VISIT (OUTPATIENT)
Dept: INTERNAL MEDICINE | Facility: CLINIC | Age: 77
End: 2022-02-21
Payer: COMMERCIAL

## 2022-02-21 VITALS
HEIGHT: 64 IN | SYSTOLIC BLOOD PRESSURE: 146 MMHG | HEART RATE: 76 BPM | DIASTOLIC BLOOD PRESSURE: 62 MMHG | OXYGEN SATURATION: 97 % | WEIGHT: 125 LBS | BODY MASS INDEX: 21.34 KG/M2

## 2022-02-21 DIAGNOSIS — G47.33 OSA (OBSTRUCTIVE SLEEP APNEA): Primary | ICD-10-CM

## 2022-02-21 PROCEDURE — 99213 OFFICE O/P EST LOW 20 MIN: CPT | Performed by: INTERNAL MEDICINE

## 2022-02-21 NOTE — PROGRESS NOTES
"Assessment/Plan:    Obstructive sleep apnea check sleep study.  Contact PCP thereafter.    Seborrheic keratosis right lateral breast reassured.  May ultimately require removal dermatology.    15 minutes spent on the date of the encounter doing chart review, patient visit and documentation     Subjective:  Kimberly Hernandez is a 76 year old female presents for the following health issues sleep apnea-like symptoms.  Snores stops breathing.    Seborrheic keratosis right lateral breast.    ROS:  Boyfriend present no blood in stool or urine denies chest pain shortness of breath med list reviewed reconciled.    Objective:  BP (!) 146/62   Pulse 76   Ht 1.626 m (5' 4\")   Wt 56.7 kg (125 lb)   SpO2 97%   BMI 21.46 kg/m    Limited exam seborrheic keratosis right lateral breast inspection only no other exam done    Mj Hankins MD  Internal Medicine    "

## 2022-03-21 ENCOUNTER — OFFICE VISIT (OUTPATIENT)
Dept: FAMILY MEDICINE | Facility: CLINIC | Age: 77
End: 2022-03-21
Payer: COMMERCIAL

## 2022-03-21 VITALS
RESPIRATION RATE: 20 BRPM | SYSTOLIC BLOOD PRESSURE: 172 MMHG | HEART RATE: 66 BPM | DIASTOLIC BLOOD PRESSURE: 67 MMHG | OXYGEN SATURATION: 96 % | TEMPERATURE: 97.5 F

## 2022-03-21 DIAGNOSIS — M54.31 SCIATICA OF RIGHT SIDE: Primary | ICD-10-CM

## 2022-03-21 PROCEDURE — 99213 OFFICE O/P EST LOW 20 MIN: CPT | Performed by: STUDENT IN AN ORGANIZED HEALTH CARE EDUCATION/TRAINING PROGRAM

## 2022-03-21 NOTE — PATIENT INSTRUCTIONS
Most cases of sciatica get better in about 4-6 weeksTrusted Source. However, some exercises and stretches may help the healing process while also relieving pain.  The movements below should function to increase strength and flexibility in the gluteus, piriformis, hamstring, and lower back muscles.  A person is likely to get the best results from performing these movements regularly. However, not everyone will necessarily find all of the exercises helpful because different causes of sciatica affect the sciatic nerve differently.  It is important to note that while exercises should cause a stretch and tension in the area, they should not worsen pain or cause new pain.          Gifs by [employer].  Knee to chest  This movement involves the following steps:    Lie on the back with the legs bent so that the knees point upward and the feet are flat on the floor.    Bring one knee to the chest, leaving the other foot resting on the floor.    Hold the knee to the chest for up to 30 seconds or however long is comfortable.    Slowly release the leg and repeat the process with the other leg.  Aim for 3 repetitions on each leg. As a variant to this stretch, bring both legs to the chest and hold them for 30 seconds.      Gifs by [employer].  Glute bridges  People can perform glute bridges by following these steps:    Lie on the back with the legs bent so that the knees point upward and the feet are flat on the floor about shoulder-width apart.    Push into the heels and lift the hips until the body forms a straight line from the knees to the shoulders.    Hold the position for several seconds, depending on comfort level, and then gently return the hips to the floor.  Aim for 8-10 repetitions at first, moving up to multiple sets when it is comfortable to do so.  Sitting [place] Pose      Gifs by Nicolas Farrell.  People who practice yoga may already be familiar with this movement:    Sit on the floor  and stretch the legs out straight with the feet together.    Bend the right leg and put the right ankle across the left knee.    Lean forward at the hips, allowing the upper body to come down gently toward the thigh.    Alternatively, if it is possible without discomfort, bend the left leg in, placing the hands behind the thigh, to increase the stretch.    Hold the stretch for 10-20 seconds, depending on comfort level.    Slowly release the hold and repeat the stretch on the other side.      Gifs by [employer].  Sitting trunk stretch  People can also try the following to stretch their trunk:    Sit on the ground and extend the legs straight out, flexing the toes upward.    Bend the right knee, lift the foot, and place it on the outside of the left leg by the knee.    Put the left elbow on the outside of the right knee and push into it gently, twisting toward the right side of the body.    Hold for 20-30 seconds and then release and switch sides.  Repeat this 2-3 times on each side.  Child s Pose      Gifs by [employer].  This pose, which is also popular in yoga, involves the following steps:    Start in a kneeling position, lowering the buttock onto the heels.    Separate the knees about as far apart as the hips and lie the torso down between the thighs.    Extend the arms in a relaxed position on the floor in front of the head.    Breathe into the position to relax. Do not force the buttocks onto the heels, but allow them to rest in the position so that it creates a gentle stretch.  Hold the position for up to 30 seconds if possible before gently releasing it.  Standing hamstring stretch  A person will need something low and steady to place their foot on for this stretch:        Stand upright and rest one foot on something higher than the other foot but still below hip level, such as a stool or stair step.    Flex the foot so that the toes point upward with the leg straight.    Bend  forward slightly at the hips, moving the torso down toward the leg to engage the hamstring. Keep the back straight.    Bend down as far as is possible without causing discomfort, but do not overstretch.    Hold the position for up to 30 seconds or however long is comfortable.    Gently release and repeat with the other leg.  Aim for 2-3 repetitions on each leg.      Pelvic tilts  This exercise works by strengthening the lower back, glutes, and lower abdominal muscles:    Lie on the back with the legs bent and the arms at the sides.    Tighten the stomach muscles and press the back into the floor.    Tilt the hips and pelvis slightly upward and hold the position, focusing on the breath for a few seconds.    Release the position and relax.  Aim for about 10 repetitions to start, and then build up this number over time, if possible.

## 2022-03-21 NOTE — PROGRESS NOTES
"ASSESSMENT/PLAN:  (M54.31) Sciatica of right side  (primary encounter diagnosis)  Comment: Pain and tingling radiating from right lower buttock down to posterior right knee, worse with straight leg raise on right.  No history of trauma or overexertion. Has not had this before.  History and exam overall consistent with sciatica of right side.  Mild to moderate at this juncture and think that physical therapy, Tylenol/naproxen, and home therapy should be adequate.  Plan:   -Physical Therapy Referral  -naproxen (NAPROSYN) 375 MG tablet twice daily with meals for 10 days; okay to use Tylenol too  -Provided patient education on sciatica home exercises/stretching      Appropriate PPE worn.    Options for treatment and follow-up care were reviewed with the patient and/or guardian. Kimberly Hernandez and/or guardian engaged in the decision making process and verbalized understanding of the options discussed and agreed with the final plan.    See [facility] for orders, medications, letters, patient instructions  This note was dictated with [employer].      Reynaldo Nelson MD    SUBJECTIVE:  Kimberly Hernandez is an 76 year old female who presents for \"right hip pain.\"    Says pain started a week ago spontaneously when she woke up one day.  Says the pain is originating from the right lower buttock and back and radiates down the posterior right leg to the level of the knee.  Associated with some tingling that radiates down as well.  Denies any weakness or sensory loss.  No bowel/bladder incontinence.  Denies any trauma or overexertion.  Has not had any gait imbalance, weakness, or lack of sensation or other focal neurologic deficits.  States she has not had this before.  Tried taking some Tylenol and resting hip but does not seem to be helping.  Has not done PT.    Denies any history of osteoporosis or recent bone breaks.  However chart review does show listed osteoporosis.  Denies CKD or any gastric ulcers and says that she has " not taken NSAIDs over this yet.    PMH:   has a past medical history of Abnormal levels of other serum enzymes, Alcohol abuse, Cerebral aneurysm, Cognitive decline (1970), Epilepsy (H), Hemorrhoids, Hyperlipidemia, Localized pain of knee joint, Osteoarthritis, knee, Osteopenia, Osteoporosis, Sprain of unspecified site of back, and Vitamin D deficiency.  Patient Active Problem List   Diagnosis     Hyperlipidemia     Epilepsy And Recurrent Seizures     Vitamin D Deficiency     Osteoarthritis Of The Knee     Joint Pain, Localized In The Knee     Elevated alkaline phosphatase measurement     Osteopenia- Reclast 10/17/19 restarted     DNR (do not resuscitate)     Cognitive decline     Cerebral aneurysm, nonruptured- last imaging stable 4/2021     Tobacco use     Aneurysm of carotid artery (H)     Essential hypertension     Social History     Socioeconomic History     Marital status:      Spouse name: None     Number of children: 2     Years of education: None     Highest education level: None   Occupational History     None   Tobacco Use     Smoking status: Current Every Day Smoker     Packs/day: 0.50     Years: 50.00     Pack years: 25.00     Types: Cigarettes, Cigarettes     Smokeless tobacco: Never Used   Substance and Sexual Activity     Alcohol use: Not Currently     Comment: Alcoholic Drinks/day: recovered alcoholic x 32 years      Drug use: No     Sexual activity: None   Other Topics Concern     None   Social History Narrative     None     Social Determinants of Health     Financial Resource Strain: Not on file   Food Insecurity: Not on file   Transportation Needs: Not on file   Physical Activity: Not on file   Stress: Not on file   Social Connections: Not on file   Intimate Partner Violence: Not on file   Housing Stability: Not on file     No family history on file.    ALLERGIES:  Patient has no known allergies.    Current Outpatient Medications   Medication     aspirin 81 MG EC tablet     calcium  carbonate (OS-EULALIA) 600 mg (1,500 mg) tablet     ERGOCALCIFEROL, VITAMIN D2, (VITAMIN D2 ORAL)     hydroCHLOROthiazide (HYDRODIURIL) 25 MG tablet     multivitamin therapeutic (THERAGRAN) tablet     naproxen (NAPROSYN) 375 MG tablet     phenytoin (DILANTIN) 100 MG capsule     artificial tears,hypromellose, (PURE & GENTLE) 0.3 % Drop ophthalmic drops     No current facility-administered medications for this visit.         ROS:  ROS is done and is negative for general/constitutional, eye, ENT, Respiratory, cardiovascular, GI, , Skin, musculoskeletal except as noted elsewhere.  All other review of systems negative except as noted elsewhere.    OBJECTIVE:  BP (!) 172/67 (BP Location: Right arm, Patient Position: Sitting, Cuff Size: Adult Regular)   Pulse 66   Temp 97.5  F (36.4  C) (Oral)   Resp 20   SpO2 96%   General: Sitting comfortably. No acute distress.   HEENT: EOMI.  Respiratory: No respiratory distress.   Cardiac: Warm and well perfused. Radial pulses 2+ bilaterally.  Abdominal: Abdomen is soft and non-tender without distention.  Extremities: Upper and lower extremities grossly normal. No tenderness over right greater tronchanter. Negative log roll on right. Full ROM of lumbar and thoracic spine. No sacroiliac tenderness.  No spine tenderness of the lower back.   Skin: Skin in warm without rashes.  Neurological: Motor function 5/5 in bilateral LEs; normal sensation throughout. Straight leg raise positive on right. Normal gait.   Psychiatric: Good insight and judgement.      RESULTS  No results found for any visits on 03/21/22.  No results found for this or any previous visit (from the past 48 hour(s)).

## 2022-04-25 ENCOUNTER — HOSPITAL ENCOUNTER (OUTPATIENT)
Dept: PHYSICAL THERAPY | Facility: REHABILITATION | Age: 77
Discharge: HOME | End: 2022-04-25
Payer: COMMERCIAL

## 2022-04-25 DIAGNOSIS — M54.31 SCIATICA OF RIGHT SIDE: Primary | ICD-10-CM

## 2022-04-25 PROCEDURE — 97161 PT EVAL LOW COMPLEX 20 MIN: CPT | Mod: GP | Performed by: PHYSICAL THERAPIST

## 2022-04-25 PROCEDURE — 97110 THERAPEUTIC EXERCISES: CPT | Mod: GP | Performed by: PHYSICAL THERAPIST

## 2022-04-28 NOTE — ADDENDUM NOTE
Encounter addended by: Queta Hull, PT on: 4/28/2022 10:58 AM   Actions taken: Clinical Note Signed, Flowsheet accepted, Document created, Document edited

## 2022-04-28 NOTE — PROGRESS NOTES
[facility]    OUTPATIENT PHYSICAL THERAPY ORTHOPEDIC EVALUATION  PLAN OF TREATMENT FOR OUTPATIENT REHABILITATION  (COMPLETE FOR INITIAL CLAIMS ONLY)  Patient's Last Name, First Name, M.I.  YOB: 1945  DavidKimberly  DAKSHA    Provider s Name:  [facility]   Medical Record No.  [id number]   Start of Care Date:  04/25/22   Onset Date:  03/21/22   Type:     _X__PT   ___OT   ___SLP Medical Diagnosis:  M54.31 (ICD-10-CM) - Sciatica of right side     PT Diagnosis:  sciatic nerve impingement secondary to piriformis syndrome   Visits from SOC:  1      _________________________________________________________________________________  Plan of Treatment/Functional Goals:  joint mobilization, manual therapy, neuromuscular re-education, ROM, strengthening, stretching           Goals  Goal Identifier: hep  Goal Description: Patient will verbalize and demonstrate understanding of home exercise program to reach functional goals  Target Date: 07/04/22    Goal Identifier: walking  Goal Description: Patient will be able to walk more than 5 laps around a track without pain to return to prior level of function.  Target Date: 07/04/22    Goal Identifier: weight machines  Goal Description: Patient will report being able to exercise on weight machines without pain to return to prior level of function.  Target Date: 07/04/22    Goal Identifier: symptom reduction  Goal Description: Patient will report >50% reduction in symptoms to demonstrate improved quality of life.  Target Date: 07/04/22    Therapy Frequency:  1 time/week  Predicted Duration of Therapy Intervention:  8 weeks (up to 6 visits)    Queta Hull, PT, DPT, MHA                 I CERTIFY THE NEED FOR THESE SERVICES FURNISHED UNDER        THIS PLAN OF TREATMENT AND WHILE UNDER MY CARE     (Physician co-signature of this document  indicates review and certification of the therapy plan).                     Certification Date From:  04/25/22   Certification Date To:  06/20/22    Referring Provider:  Reynaldo Nelson MD    Initial Assessment        See Epic Evaluation Start of Care Date: 04/25/22 04/25/22 0700   General Information   Type of Visit Initial OP Ortho PT Evaluation   Start of Care Date 04/25/22   Referring Physician Reynaldo Nelson MD   Orders Evaluate and Treat   Date of Order 03/21/22   Certification Required? Yes   Medical Diagnosis M54.31 (ICD-10-CM) - Sciatica of right side   Surgical/Medical history reviewed Yes   Precautions/Limitations no known precautions/limitations   Body Part(s)   Body Part(s) Hip   Presentation and Etiology   Pertinent history of current problem (include personal factors and/or comorbidities that impact the POC) Patient reports her symptoms are primarily in R buttock and leg. The pain sometimes radiates down to knee and is shooting and sharp. The symptoms increase with exertion. She is an active person and travels a lot, and this pain limits what she can do.   Impairments A. Pain;F. Decreased strength and endurance   Functional Limitations perform desired leisure / sports activities   How/Where did it occur From insidious onset   Onset date of current episode/exacerbation 03/21/22   Chronicity New   Pain rating (0-10 point scale) Best (/10);Worst (/10)   Best (/10) 1   Worst (/10) 8   Pain quality A. Sharp;E. Shooting   Frequency of pain/symptoms C. With activity   Pain/symptoms are: The same all the time   Pain/symptoms exacerbated by B. Walking   Pain/symptoms eased by C. Rest;I. OTC medication(s)   Progression of symptoms since onset: Improved   Fall Risk Screen   Fall screen completed by PT   Have you fallen 2 or more times in the past year? Yes   Have you fallen and had an injury in the past year? No   Is patient a fall risk? No   Fall screen comments tripped over obstacles, no injuries    Abuse Screen (yes response referral indicated)   Feels Unsafe at Home or Work/School no   Feels Threatened by Someone no   Does Anyone Try to Keep You From Having Contact with Others or Doing Things Outside Your Home? no   Physical Signs of Abuse Present no   Patient needs abuse support services and resources No   System Outcome Measures   Outcome Measures Low Back Pain (see Oswestry and Alissa)  (32%)   Hip Objective Findings   Gait/Locomotion normal   Hip Flexibility Comments decreased rotation R>L   Hip ROM Comments WFL bilateral   Lumbar ROM WFL no pain   Pelvic Screen level in supine   Functional Closed Chain Screen WFL   Hip/Knee Strength Comments hip flexion 4/5 B, all other mulscle groups WFL   Straight Leg Raise Test (-)   Scour Test + R   GUERITA Test + R   FADIR Test + R   Palpation tenderness to R buttock   Accessory Motion/Joint Mobility hypomoble B SI joint   Neurological Testing Comments negative slump and spring test   Posture mild forward head   Planned Therapy Interventions   Planned Therapy Interventions joint mobilization;manual therapy;neuromuscular re-education;ROM;strengthening;stretching   Clinical Impression   Criteria for Skilled Therapeutic Interventions Met yes, treatment indicated   PT Diagnosis sciatic nerve impingement secondary to piriformis syndrome   Influenced by the following impairments SI hypomobility, decreased hip flexibility, decreased hip strength   Functional limitations due to impairments decreased activity tolerance, difficulty walking more than 1/2 a mile,   Clinical Presentation Stable/Uncomplicated   Clinical Decision Making (Complexity) Low complexity   Therapy Frequency 1 time/week   Predicted Duration of Therapy Intervention (days/wks) 8 weeks  (up to 6 visits)   Risk & Benefits of therapy have been explained Yes   Patient, Family & other staff in agreement with plan of care Yes   Clinical Impression Comments Kimberly Rodriguez is a pleasant 76 year old female who presents to  physical therapy with signs and symptoms of R sciatica secondary to tight R piriformis. She reports her symptoms range from 1-8/10 and are localized to the R buttock with occasional radiation down to the knee. She has had no symptoms in the back. Her pain limits her ability to walk more than 1/2 a mile and participate in leisure activities. On exam, she demonstrated decreased hip flexor strength bilaterally, a positive R hip scour, GUERITA, FADIR, and decreased hip flexibility into external rotation as well as tenderness with palpation to R buttock. She will benefit from skilled outpatient physical therapy to improve pain, ROM and strength.   ORTHO GOALS   PT Ortho Eval Goals 1;2;3;4   Ortho Goal 1   Goal Identifier hep   Goal Description Patient will verbalize and demonstrate understanding of home exercise program to reach functional goals   Target Date 07/04/22   Ortho Goal 2   Goal Identifier walking   Goal Description Patient will be able to walk more than 5 laps around a track without pain to return to prior level of function.   Target Date 07/04/22   Ortho Goal 3   Goal Identifier weight machines   Goal Description Patient will report being able to exercise on weight machines without pain to return to prior level of function.   Target Date 07/04/22   Ortho Goal 4   Goal Identifier symptom reduction   Goal Description Patient will report >50% reduction in symptoms to demonstrate improved quality of life.   Target Date 07/04/22   Total Evaluation Time   PT Eval, Low Complexity Minutes (38890) 25   Therapy Certification   Certification date from 04/25/22   Certification date to 06/20/22   Medical Diagnosis M54.31 (ICD-10-CM) - Sciatica of right side     Queta Hull, PT, DPT, MHA

## 2022-05-17 ENCOUNTER — VIRTUAL VISIT (OUTPATIENT)
Dept: SLEEP MEDICINE | Facility: CLINIC | Age: 77
End: 2022-05-17
Attending: INTERNAL MEDICINE
Payer: COMMERCIAL

## 2022-05-17 DIAGNOSIS — G47.33 OSA (OBSTRUCTIVE SLEEP APNEA): ICD-10-CM

## 2022-05-17 PROCEDURE — 99442 PR PHYSICIAN TELEPHONE EVALUATION 11-20 MIN: CPT | Mod: 95 | Performed by: INTERNAL MEDICINE
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
## 2022-05-18 NOTE — NURSING NOTE
Attempted to reach patient via phone unsuccessful left message to call back to schedule sleep study and return visit (in person)  for results.          KAYODE Garner  [facility] [place]

## 2022-06-01 ENCOUNTER — VIRTUAL VISIT (OUTPATIENT)
Dept: NEUROLOGY | Facility: CLINIC | Age: 77
End: 2022-06-01
Attending: NURSE PRACTITIONER
Payer: COMMERCIAL

## 2022-06-01 DIAGNOSIS — I99.9 MILD VASCULAR NEUROCOGNITIVE DISORDER: Primary | ICD-10-CM

## 2022-06-01 DIAGNOSIS — F06.70 MILD VASCULAR NEUROCOGNITIVE DISORDER: Primary | ICD-10-CM

## 2022-06-01 NOTE — LETTER
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 challenging. 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     More recently, Ms. Hernandez worked at [employer] and at [employer] in [facility]. She last worked at [employer] about 3 years ago and had been there for about 2 years.     Ms. Hernandez was  to her first  for 8 years when he asked for a divorce after her aneurysm rupture. She has son from that marriage. She was  to her second  for 30 years when he passed away 3 years ago. He had a son as well. She has 4 grandchildren (2 from 2nd ) and 2 great grand-children. She has been with her partner Jerome for a little less than one year.     Ms. Hernandez has been living in her current home since 1979. She lives independently. She meets friends at [employer] most mornings.     MENTAL STATUS AND BEHAVIORAL OBSERVATIONS:   Ms. Hernandez was alert and engaged during the interview on 6/1. Her mood was euthmyic and her affect was appropriately reactive. Rapport was easily established and eye contact was unremarkable. She was pleasant and cooperative. Rate, prosody, and content of speech were grossly normal. No significant word finding difficulties or paraphasic errors were " evident. There was no evidence of a kellen thought disorder; no hallucinations or delusions were apparent. Judgment and insight appeared fair.       On 6/6 when Ms. Hernandez came for testing, she arrived 15 minutes early and accompanied by her partner Jerome. She was appropriately dressed and groomed. She appeared adequately motivated and engaged easily in the testing component of the evaluation. Her performance was fully intact on embedded measures of objective effort. She attempted all tasks presented to her and worked at a steady pace.  She did not appear overly frustrated by difficult or challenging tasks but she did appear anxious throughout most of testing. No significant barriers to testing were observed and the following is judged to be a valid representation of Ms. Hernandez's current cognitive strengths and weaknesses.    LIMITATIONS:  Due to circumstances that limit contact during in-person clinical visits, this assessment was conducted using face-to-face testing with the examiner wearing [employer] designated PPE and the patient wearing a face mask. The standard administration of these procedures involves in-person, face-to-face methods without PPE. The impact of applying non-standard administration methods has been evaluated only in part by scientific research. While every effort was made to simulate standard assessment practices, the diagnostic conclusions and recommendations for treatment provided in this report are being advanced with these limitations in mind.    TESTS ADMINISTERED:   [place] Judgment of Line Orientation- Form V (MERA), [place] Naming Test (BNT), Brief Visuospatial Memory Test - R Form 1 (BVMT-R), Category Fluency- AFV (CAT), Clock Drawing, Controlled Oral Word Association Test CFL (COWAT), Geriatric Depression Scale 30 (GDS), Francis Verbal Learning Test - R Form 1 (HVLT-R), Andreas-Osterrieth Complex Figure Test, copy only (RCFT), Stroop Color and Word Test, Trail Making Test (TMT),  WAIS-IV (Similarities, Information, Block Design, Matrix Reasoning, Digit Span), WMS-IV Logical Memory, WRAT-5 Word Reading (blue), Wisconsin Card Sorting Test-1 deck (WCST) and WMS-III Information and Orientation.    MOANS norms were used for BNT, CAT, COWAT, RCFT, Stroop, TMT    Raw scores in parentheses    DESCRIPTIVE PERFORMANCE KEY:    Labels for tests with Normal Distributions  Score Label Standard Score %ile Rank   Exceptionally high score  > 130 > 98   Above average score 120-129 91-97   High average score 110-119 75-90   Average score  25-74   Low average score 80-89 9-24   Below average score 70-79 2-8   Exceptionally low score < 70 < 2     Labels for tests with Non-Normal Distributions  Score Label %ile Rank   Within normal expectations/ limits score (WNL) > 24   Low average score 9-24   Below average score 2-8   Exceptionally low score < 2     The following test results utilize score labels as adapted from Chay James, Kimani Paiz, Tanya Galarza, ROBER Campos, Sylvia Cota, Mj Diehl & Conference Participants (2020): American Academy of Clinical Neuropsychology consensus conference statement on uniform labeling of performance test scores, The Clinical Neuropsychologist, DOI: 10.1080/[email or web address]    All scores contain some measure of error; scores are reported here as they are obtained by the individual (without reference to the range of error). These are meant as labels and not interpretation of performance. While other relevant comments regarding task performance are provided below, please see the Assessment, Impressions and Diagnostic Summary sections of this report for interpretation of the scores and the cognitive profile as a whole, including what does and does not constitute impairment.    OPTIMAL PREMORBID INTELLECT:  Optimal premorbid intellectual abilities were estimated as falling in the average to high average range based  "on Ms. Hernandez's educational and occupational histories and performance on tasks least likely to be affected by acquired brain dysfunction (i.e.,  hold tests ).    SUMMARY OF TEST RESULTS:     Orientation, Attention and Processing Speed  Mental status exam was measured as a within normal limits score for her age (14). She was oriented to person, place, time, and date and was able to correctly name the current and previous presidents.    Performance on a measure of basic attention and working memory was assessed as an average score (20). This reflected an average score for basic attention skills (LDF = 5) and an average score for working memory skills (LDB = 4, LDS = 4).    A speeded word reading task (78) was assessed as a low average to average score. A simple sequencing task (61\" ) and a speeded color naming task (48) were both assessed as an average score.    Language  Sight word reading skills (52) were assessed as a low average score. Verbal abstraction skills (26) were assessed as a high average score. Fund of general knowledge (20) was assessed as a high average score. Her performance on a measure of semantic fluency was measured as a low average to average score (32). Confrontation naming was measured as a within normal limits score (56).    Visuospatial Skills  Performance on a block construction task (24) resulted in an average score. Performance on a pattern completion task (14) was measured as a high average score. Basic visuoaptial judgement was assessed as an average score (20). Her copy of a series of geometric figures was notable for mild inattention to detail (9/12).     Learning and Memory  Immediate memory for two short stories was measured as an average score  (25). Delayed recall of these stories resulted in a below average score (2 details, 10% retention). Recognition memory was measured as an exceptionally low score (10/23). She was also administered a measure of rote auditory verbal list " "learning that required her to learn a series of 12 words over three trials and retain and recall them over a delay. Her initial rate of learning (4,4,5) reflected a below average score. She retained and recalled no words after the delay for an exceptionally low score for delayed recall.  Recognition memory was measured as a low average score as she correctly identified 9 of the original words and made no false positive errors.     Immediate nonverbal memory (1,4,4) for a series of 6 geometric figures was measured as a below average score while delayed recall of these figures (5 details) resulted in a low average score. Recognition memory was measured as a within normal limits score as she correctly identified 5 of the original figures and made no false positive errors.    Executive Functioning  Working memory skills were assessed as an average score (LDB = 4, LDS = 4). A complex sequencing and set shifting task was discontinued at 690\" (at 11). At that point, she had made 4 errors. Her performance on a measure of phonemic fluency resulted in an average to high average score (41). On a measure of deductive reasoning and problem-solving, overall performance was assessed as a low average score for her age and education in terms of the number of categories learned (1). Her performance was characterized by a slightly perseverative response style (low average score, WY= 29, NPE= 9). Her ability to inhibit a dominant response was assessed as a low average to average score (25). Her performance on this task was notable for 2 errors. Her copy of a complex figure was performed as a low average score for her age and notable for mild carelessness and difficulty integrating detail (23.5). Her drawing of a clock was unremarkable.     Mood  On the Geriatric Depression Scale-30 (GDS), a self report measure of depressive symptomatology, she obtained a score of 11, placing her in the range of mild symptoms of depression. "     EVALUATION SERVICES AND TIME:   A clinical interview/neurobehavioral status examination was conducted with the patient and documented. I thoroughly reviewed the medical record, selected the neuropsychological test battery, provided supervision to the individual who administered and scored the neuropsychological test battery, interpreted/integrated patient data and test results, engaged in clinical decision making, treatment planning, report writing/preparation and provided and documented interactive feedback of test results on June 21st. A trained examiner/technician directly administered 2+ of the neuropsychological tests and I (Psychologist) scored the neuropsychological tests (2 + tests). Please see below for a breakdown of time spent and the associated codes billed for these services. Please note, all charges are filed at the completion of the Episode of Care and associated with the final encounter date (feedback session on June 21st).    Services   Time Spent  CPT Codes   Neurobehavioral Status Exam:  (e.g., face-to-face, interpretation, report) 95 minutes 1 x 96116  1 x 96121   Neuropsychological Evaluation Services:   (e.g., integration, interpretation, treatment planning, clinical decision making, feedback)   190 minutes   1 x 96132  2 x 96133   Neuropsychological Testing by Psychologist:  (e.g., test administration, scoring, 2+ tests administered)   50 minutes   1 x 96136  1 x 96137   Neuropsychological Testing by Trained Examiner/Technician:  (e.g., test administration, scoring, 2+ tests administered)   115 minutes   1 x 96138  3 x 96139     Telephone Visit Details (6/1/2022)    Type of service:  Telephone Visit    Interview with Provider and Ms Hernandez:  Start Time: 1230pm  End Time: 140pm  And   Interview with Provider and significant other Jerome  Start Time: 140pm  End Time: 150pm    Distant Location (provider location):  [facility]    Mode of Communication:  Telephone    Kimberly Hernandez was evaluated via a billable telephone visit.    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 Feedback of results will be provided via a formal feedback appointment with me on June 21st.       Leela Rios, PhD, LP, ABPP  Clinical Neuropsychologist, [id number]  Board Certified in Clinical Neuropsychology    [facility] [address] , [address]  [place] [address]  Phone:  [phone]      Again, thank you for allowing me to participate in the care of your patient.        Sincerely,        Leela Rios, PhD LP

## 2022-06-01 NOTE — PROGRESS NOTES
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 worked at [employer] and at [employer] in [employer]. She last worked at [employer] about 3 years ago and had been there for about 2 years.     Ms. Hernandez was  to her first  for 8 years when he asked for a divorce after her aneurysm rupture. She has son from that marriage. She was  to her second  for 30 years when he passed away 3 years ago. He had a son as well. She has 4 grandchildren (2 from 2nd ) and 2 great grand-children. She has been with her partner Jerome for a little less than one year.     Ms. Hernandez has been living in her current home since 1979. She lives independently. She meets friends at [facility] most mornings.     MENTAL STATUS AND BEHAVIORAL OBSERVATIONS:   Ms. Hernandez was alert and engaged during the interview on 6/1. Her mood was euthmyic and her affect was appropriately reactive. Rapport was easily established and eye contact was unremarkable. She was pleasant and cooperative. Rate, prosody, and content of speech were grossly normal. No significant word finding difficulties or paraphasic errors were evident. There was no evidence of a kellen thought disorder; no hallucinations or delusions were apparent. Judgment and insight appeared fair.       On 6/6 when Ms. Hernandez came for testing, she arrived 15 minutes  early and accompanied by her partner Jerome. She was appropriately dressed and groomed. She appeared adequately motivated and engaged easily in the testing component of the evaluation. Her performance was fully intact on embedded measures of objective effort. She attempted all tasks presented to her and worked at a steady pace.  She did not appear overly frustrated by difficult or challenging tasks but she did appear anxious throughout most of testing. No significant barriers to testing were observed and the following is judged to be a valid representation of Ms. Hernandez's current cognitive strengths and weaknesses.    LIMITATIONS:  Due to circumstances that limit contact during in-person clinical visits, this assessment was conducted using face-to-face testing with the examiner wearing [place] designated PPE and the patient wearing a face mask. The standard administration of these procedures involves in-person, face-to-face methods without PPE. The impact of applying non-standard administration methods has been evaluated only in part by scientific research. While every effort was made to simulate standard assessment practices, the diagnostic conclusions and recommendations for treatment provided in this report are being advanced with these limitations in mind.    TESTS ADMINISTERED:   [place]pe Judgment of Line Orientation- Form V (MERA), [place] Naming Test (BNT), Brief Visuospatial Memory Test - R Form 1 (BVMT-R), Category Fluency- AFV (CAT), Clock Drawing, Controlled Oral Word Association Test CFL (COWAT), Geriatric Depression Scale 30 (GDS), Francis Verbal Learning Test - R Form 1 (HVLT-R), Andreas-Osterrieth Complex Figure Test, copy only (RCFT), Stroop Color and Word Test, Trail Making Test (TMT), WAIS-IV (Similarities, Information, Block Design, Matrix Reasoning, Digit Span), WMS-IV Logical Memory, WRAT-5 Word Reading (blue), Wisconsin Card Sorting Test-1 deck (WCST) and WMS-III Information and  Orientation.    MOANS norms were used for BNT, CAT, COWAT, RCFT, Stroop, TMT    Raw scores in parentheses    DESCRIPTIVE PERFORMANCE KEY:    Labels for tests with Normal Distributions  Score Label Standard Score %ile Rank   Exceptionally high score  > 130 > 98   Above average score 120-129 91-97   High average score 110-119 75-90   Average score  25-74   Low average score 80-89 9-24   Below average score 70-79 2-8   Exceptionally low score < 70 < 2     Labels for tests with Non-Normal Distributions  Score Label %ile Rank   Within normal expectations/ limits score (WNL) > 24   Low average score 9-24   Below average score 2-8   Exceptionally low score < 2     The following test results utilize score labels as adapted from Chay James, Kimani Paiz, Tanya Galarza, ROBER Campos, Sylvia Cota, Denzel Vela, Mj Jimenes & Conference Participants (2020): American Academy of Clinical Neuropsychology consensus conference statement on uniform labeling of performance test scores, The Clinical Neuropsychologist, DOI: 10.1080/[email or web address]    All scores contain some measure of error; scores are reported here as they are obtained by the individual (without reference to the range of error). These are meant as labels and not interpretation of performance. While other relevant comments regarding task performance are provided below, please see the Assessment, Impressions and Diagnostic Summary sections of this report for interpretation of the scores and the cognitive profile as a whole, including what does and does not constitute impairment.    OPTIMAL PREMORBID INTELLECT:  Optimal premorbid intellectual abilities were estimated as falling in the average to high average range based on Ms. Hernandez's educational and occupational histories and performance on tasks least likely to be affected by acquired brain dysfunction (i.e.,  hold tests ).    SUMMARY OF TEST RESULTS:  "    Orientation, Attention and Processing Speed  Mental status exam was measured as a within normal limits score for her age (14). She was oriented to person, place, time, and date and was able to correctly name the current and previous presidents.    Performance on a measure of basic attention and working memory was assessed as an average score (20). This reflected an average score for basic attention skills (LDF = 5) and an average score for working memory skills (LDB = 4, LDS = 4).    A speeded word reading task (78) was assessed as a low average to average score. A simple sequencing task (61\" ) and a speeded color naming task (48) were both assessed as an average score.    Language  Sight word reading skills (52) were assessed as a low average score. Verbal abstraction skills (26) were assessed as a high average score. Fund of general knowledge (20) was assessed as a high average score. Her performance on a measure of semantic fluency was measured as a low average to average score (32). Confrontation naming was measured as a within normal limits score (56).    Visuospatial Skills  Performance on a block construction task (24) resulted in an average score. Performance on a pattern completion task (14) was measured as a high average score. Basic visuoaptial judgement was assessed as an average score (20). Her copy of a series of geometric figures was notable for mild inattention to detail (9/12).     Learning and Memory  Immediate memory for two short stories was measured as an average score  (25). Delayed recall of these stories resulted in a below average score (2 details, 10% retention). Recognition memory was measured as an exceptionally low score (10/23). She was also administered a measure of rote auditory verbal list learning that required her to learn a series of 12 words over three trials and retain and recall them over a delay. Her initial rate of learning (4,4,5) reflected a below average score. She " "retained and recalled no words after the delay for an exceptionally low score for delayed recall.  Recognition memory was measured as a low average score as she correctly identified 9 of the original words and made no false positive errors.     Immediate nonverbal memory (1,4,4) for a series of 6 geometric figures was measured as a below average score while delayed recall of these figures (5 details) resulted in a low average score. Recognition memory was measured as a within normal limits score as she correctly identified 5 of the original figures and made no false positive errors.    Executive Functioning  Working memory skills were assessed as an average score (LDB = 4, LDS = 4). A complex sequencing and set shifting task was discontinued at 690\" (at 11). At that point, she had made 4 errors. Her performance on a measure of phonemic fluency resulted in an average to high average score (41). On a measure of deductive reasoning and problem-solving, overall performance was assessed as a low average score for her age and education in terms of the number of categories learned (1). Her performance was characterized by a slightly perseverative response style (low average score, MN= 29, NPE= 9). Her ability to inhibit a dominant response was assessed as a low average to average score (25). Her performance on this task was notable for 2 errors. Her copy of a complex figure was performed as a low average score for her age and notable for mild carelessness and difficulty integrating detail (23.5). Her drawing of a clock was unremarkable.     Mood  On the Geriatric Depression Scale-30 (GDS), a self report measure of depressive symptomatology, she obtained a score of 11, placing her in the range of mild symptoms of depression.     EVALUATION SERVICES AND TIME:   A clinical interview/neurobehavioral status examination was conducted with the patient and documented. I thoroughly reviewed the medical record, selected the " neuropsychological test battery, provided supervision to the individual who administered and scored the neuropsychological test battery, interpreted/integrated patient data and test results, engaged in clinical decision making, treatment planning, report writing/preparation and provided and documented interactive feedback of test results on June 21st. A trained examiner/technician directly administered 2+ of the neuropsychological tests and I (Psychologist) scored the neuropsychological tests (2 + tests). Please see below for a breakdown of time spent and the associated codes billed for these services. Please note, all charges are filed at the completion of the Episode of Care and associated with the final encounter date (feedback session on June 21st).    Services   Time Spent  CPT Codes   Neurobehavioral Status Exam:  (e.g., face-to-face, interpretation, report) 95 minutes 1 x 96116  1 x 96121   Neuropsychological Evaluation Services:   (e.g., integration, interpretation, treatment planning, clinical decision making, feedback)   190 minutes   1 x 96132  2 x 96133   Neuropsychological Testing by Psychologist:  (e.g., test administration, scoring, 2+ tests administered)   50 minutes   1 x 96136  1 x 96137   Neuropsychological Testing by Trained Examiner/Technician:  (e.g., test administration, scoring, 2+ tests administered)   115 minutes   1 x 96138  3 x 96139     Telephone Visit Details (6/1/2022)    Type of service:  Telephone Visit    Interview with Provider and Ms Hernandez:  Start Time: 1230pm  End Time: 140pm  And   Interview with Provider and significant other Jerome  Start Time: 140pm  End Time: 150pm    Distant Location (provider location):  Remote work for [facility]    Mode of Communication:  Telephone    Kimberly Hernandez was evaluated via a billable telephone visit.    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 Feedback of results will be provided via a formal feedback appointment with me on June 21st.       Leela Rios, PhD, LP, ABPP  Clinical Neuropsychologist, LP#[id number]  Board Certified in Clinical Neuropsychology    [facility] [place] [address], [address]  [place] [address]  Phone:  [phone]

## 2022-06-02 ENCOUNTER — HOSPITAL ENCOUNTER (OUTPATIENT)
Dept: PHYSICAL THERAPY | Facility: REHABILITATION | Age: 77
Discharge: HOME | End: 2022-06-02
Payer: COMMERCIAL

## 2022-06-02 DIAGNOSIS — M54.31 SCIATICA OF RIGHT SIDE: Primary | ICD-10-CM

## 2022-06-02 PROCEDURE — 97110 THERAPEUTIC EXERCISES: CPT | Mod: GP | Performed by: PHYSICAL THERAPIST

## 2022-06-02 PROCEDURE — 97140 MANUAL THERAPY 1/> REGIONS: CPT | Mod: GP | Performed by: PHYSICAL THERAPIST

## 2022-06-06 ENCOUNTER — OFFICE VISIT (OUTPATIENT)
Dept: NEUROLOGY | Facility: CLINIC | Age: 77
End: 2022-06-06
Payer: COMMERCIAL

## 2022-06-06 DIAGNOSIS — I99.9 MILD VASCULAR NEUROCOGNITIVE DISORDER: Primary | ICD-10-CM

## 2022-06-06 DIAGNOSIS — F06.70 MILD VASCULAR NEUROCOGNITIVE DISORDER: Primary | ICD-10-CM

## 2022-06-06 NOTE — LETTER
6/6/2022         RE: Kimberly Hernandez  [address] [address]        Dear Colleague,    Thank you for referring your patient, Kimberly Hernandez, to the [facility]. Please see a copy of my visit note below.    The patient was seen for a neuropsychological evaluation for the purposes of diagnostic clarification and treatment planning. 115 minutes of face-to-face testing were provided by this writer. The patient was cooperative with testing. No concerns were brought to my attention. Please see Dr. Rios's report for a detailed description of the charges and interpretation and integration of the findings.    Ms Hernandez returned on 6/6 to complete cognitive testing. Please see original evaluation date of 6/1 for full report and billing information.       Again, thank you for allowing me to participate in the care of your patient.        Sincerely,        Leela Rios, PhD LP

## 2022-06-10 NOTE — PROGRESS NOTES
The patient was seen for a neuropsychological evaluation for the purposes of diagnostic clarification and treatment planning. 115 minutes of face-to-face testing were provided by this writer. The patient was cooperative with testing. No concerns were brought to my attention. Please see Dr. Rios's report for a detailed description of the charges and interpretation and integration of the findings.

## 2022-06-13 ENCOUNTER — TELEPHONE (OUTPATIENT)
Dept: SLEEP MEDICINE | Facility: CLINIC | Age: 77
End: 2022-06-13
Payer: COMMERCIAL

## 2022-06-13 NOTE — TELEPHONE ENCOUNTER
Patient has been informed there are  No telephone visits at this time and the other option would be in office. Patient will keep virtual video visit at this time, if anything changes she will let the sleep clinic know.

## 2022-06-13 NOTE — TELEPHONE ENCOUNTER
Reason for Call:  Other call back    Detailed comments: PT wants two week f/u from in lab sleep study w/ Dr. Gold to be by phone not video, please call to advise pt.     Phone Number Patient can be reached at: Home number on file [phone] (home)    Best Time: ANY    Can we leave a detailed message on this number? YES    Call taken on 6/13/2022 at 1:58 PM by Marely Goyal

## 2022-06-21 ENCOUNTER — VIRTUAL VISIT (OUTPATIENT)
Dept: NEUROLOGY | Facility: CLINIC | Age: 77
End: 2022-06-21
Payer: COMMERCIAL

## 2022-06-21 DIAGNOSIS — F06.70 MILD VASCULAR NEUROCOGNITIVE DISORDER: Primary | ICD-10-CM

## 2022-06-21 DIAGNOSIS — I99.9 MILD VASCULAR NEUROCOGNITIVE DISORDER: Primary | ICD-10-CM

## 2022-06-21 PROCEDURE — 96121 NUBHVL XM PHY/QHP EA ADDL HR: CPT | Mod: 95 | Performed by: CLINICAL NEUROPSYCHOLOGIST

## 2022-06-21 PROCEDURE — 96132 NRPSYC TST EVAL PHYS/QHP 1ST: CPT | Mod: 95 | Performed by: CLINICAL NEUROPSYCHOLOGIST

## 2022-06-21 PROCEDURE — 96133 NRPSYC TST EVAL PHYS/QHP EA: CPT | Mod: 95 | Performed by: CLINICAL NEUROPSYCHOLOGIST

## 2022-06-21 PROCEDURE — 96136 PSYCL/NRPSYC TST PHY/QHP 1ST: CPT | Mod: 95 | Performed by: CLINICAL NEUROPSYCHOLOGIST

## 2022-06-21 PROCEDURE — 96137 PSYCL/NRPSYC TST PHY/QHP EA: CPT | Mod: 95 | Performed by: CLINICAL NEUROPSYCHOLOGIST

## 2022-06-21 PROCEDURE — 96139 PSYCL/NRPSYC TST TECH EA: CPT | Mod: 59 | Performed by: CLINICAL NEUROPSYCHOLOGIST

## 2022-06-21 PROCEDURE — 96116 NUBHVL XM PHYS/QHP 1ST HR: CPT | Mod: 95 | Performed by: CLINICAL NEUROPSYCHOLOGIST

## 2022-06-21 PROCEDURE — 96138 PSYCL/NRPSYC TECH 1ST: CPT | Mod: 59 | Performed by: CLINICAL NEUROPSYCHOLOGIST

## 2022-06-21 NOTE — PROGRESS NOTES
"NEUROPSYCHOLOGY TELE-HEALTH FEEDBACK VISIT  [facility]    Telephone Visit  Kimberly Hernandez is a 77 year old female who is being evaluated via a billable telephone visit.    The patient has been notified of the following:      \"This telephone visit will be conducted via a call between you and your provider. We have found that certain health care needs can be provided without the need for a physical exam.  This service lets us provide the care you need with a phone conversation. If during the course of the call the provider feels a telephone visit is not appropriate, you will not be charged for this service.\"     Patient has given verbal consent to a Telephone visit? Yes  Consent has been obtained for this service by 1 care team member: yes.    Visit Summary  The purpose of today s appointment was to provide feedback regarding Ms. Hernandez's recent Neuropsychological Consult completed on June 1st and June 6th. We began the session by discussing her experience during the evaluation. I provided Ms. Hernandez and her boyfriend Jerome with feedback regarding her performance on cognitive testing and her pattern of cognitive strengths and weaknesses including deficits in learning and memory in the context of an otherwise largely intact cognitive profile. I discussed my overall impressions of how this likely is related to her history of multiple aneurysms/ rupture but that I am not clear how much the current deficits reflect stability vs progressive decline. We discussed recommendations including strategies for supporting memory day to day and monitoring her cognition over time. I provided the opportunity for Ms. Hernandez and her partner to ask questions about the evaluation and results. At the end of the session, they indicated that they understood the results and that I had answered all of their questions. They were encouraged to reach out to me (contact information included in the AVS) " should any further questions or concerns arise in the future. Impressions and recommendations from the report were provided as part of the After Visit Summary which was directed to clinic staff to print and send by mail. I explained that Luz Ardon would be receiving the full report as the consulting provider.      Leela Rios, PhD, LP, [facility]  Clinical Neuropsychologist, LP#[id number]  Board Certified in Clinical Neuropsychology    [facility]  [address], [address]  [place] [address]  Phone:  [phone]    Telephone Visit Details    Type of service:  Telephone Visit  Start Time: 130pm  End Time: 200pm    Originating Location (pt. Location): Home    Distant Location (provider location):  Remote work for [facility]    Mode of Communication:  Telephone    Kimberly Hernandez was evaluated via a billable telephone visit.       For diagnostic and coding purposes, Ms. 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 Please see the 6/1/2022 evaluation for a detailed description of codes and services, including services provided today.      In brief:   1 x 96116  1 x 96121  1 x 96132  2 x 96133  1 x 96136  1 x 96137  1 x 96138  3 x 96139

## 2022-06-21 NOTE — LETTER
"    6/21/2022         RE: Kimberly Hernandez  [address] [address]        Dear Colleague,    Thank you for referring your patient, Kimberly Hernandez, to the [facility]. Please see a copy of my visit note below.    NEUROPSYCHOLOGY TELE-HEALTH FEEDBACK VISIT  [facility]    Telephone Visit  Kimberly Hernandez is a 77 year old female who is being evaluated via a billable telephone visit.    The patient has been notified of the following:      \"This telephone visit will be conducted via a call between you and your provider. We have found that certain health care needs can be provided without the need for a physical exam.  This service lets us provide the care you need with a phone conversation. If during the course of the call the provider feels a telephone visit is not appropriate, you will not be charged for this service.\"     Patient has given verbal consent to a Telephone visit? Yes  Consent has been obtained for this service by 1 care team member: yes.    Visit Summary  The purpose of today s appointment was to provide feedback regarding Ms. Hernandez's recent Neuropsychological Consult completed on June 1st and June 6th. We began the session by discussing her experience during the evaluation. I provided Ms. Hernandez and her boyfriend Jerome with feedback regarding her performance on cognitive testing and her pattern of cognitive strengths and weaknesses including deficits in learning and memory in the context of an otherwise largely intact cognitive profile. I discussed my overall impressions of how this likely is related to her history of multiple aneurysms/ rupture but that I am not clear how much the current deficits reflect stability vs progressive decline. We discussed recommendations including strategies for supporting memory day to day and monitoring her cognition over time. I provided the opportunity for Ms. Hernandez and her partner " to ask questions about the evaluation and results. At the end of the session, they indicated that they understood the results and that I had answered all of their questions. They were encouraged to reach out to me (contact information included in the AVS) should any further questions or concerns arise in the future. Impressions and recommendations from the report were provided as part of the After Visit Summary which was directed to clinic staff to print and send by mail. I explained that Luz Ardon would be receiving the full report as the consulting provider.      Leela Rios, PhD, LP, ABPP  Clinical Neuropsychologist, LP#[id number]  Board Certified in Clinical Neuropsychology    [facility]  [address], [address]  [place] [address]  Phone:  [phone]    Telephone Visit Details    Type of service:  Telephone Visit  Start Time: 130pm  End Time: 200pm    Originating Location (pt. Location): Home    Distant Location (provider location):  Remote work for [facility]    Mode of Communication:  Telephone    Kimberly Hernandez was evaluated via a billable telephone visit.       For diagnostic and coding purposes, Ms. 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 Please see the 6/1/2022 evaluation for a detailed description of codes and  services, including services provided today.      In brief:   1 x 96116  1 x 96121  1 x 96132  2 x 96133  1 x 96136  1 x 96137  1 x 96138  3 x 96139      Again, thank you for allowing me to participate in the care of your patient.        Sincerely,        Leela Rios, PhD LP

## 2022-06-21 NOTE — PROGRESS NOTES
Ms Hernandez returned on 6/6 to complete cognitive testing. Please see original evaluation date of 6/1 for full report and billing information.

## 2022-06-21 NOTE — PATIENT INSTRUCTIONS
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 Hernandez should be reassured that outside of learning and memory, her mental faculties are largely well preserved  There are no concerns with Ms. Hernandez s current ability to continue to live independently, drive and manage her finances or personal affairs.  Physical and Emotional Health  It is important that Ms. Hernandez continue to adhere to her medication treatment regimen and follow a healthy diet so as to maintain her physical health, as this can have a significant impact on her physical, emotional, and cognitive functioning.   Ms. Hernandez does not appear to be struggling with any significant emotional symptoms. Behavioral activation techniques such as regular exercise (under the guidance of her physician), recreational activities and regular social interaction (with proper social distancing when indicated) would likely be effective in helping Ms. Hernandez to continue to maintain her mood.   Memory and Organization  Ms. Hernandez is encouraged to continue to engage in stimulating activities, (i.e., reading, card games, puzzles) to keep her cognitively active.   Ms. Hernandez demonstrates intact basic attention but notable memory impairments, thus, she should not be given instructions for tasks any more than a few minutes in the future.  In her daily life, Ms. Hernandez will continue to benefit from the use of compensation techniques. That is, she may find it helpful to post reminder notes around the house, make lists, and carry a small calendar so that she  can feel more comfortable and confident in her ability to remember information. A daily planner could also be used as a memory book where important information is recorded and organized for future reference.   Ms. Hernandez should also create a system to establish set locations for certain items (i.e., keys) such that she always knows where to put them upon entering the house and where to look when she needs them. If she would like to keep certain items out of sight (i.e., a wallet), she could set up a specific hidden place to keep items and use that same place so as to ensure she can find the required item when needed.   Ms. Hernandez will do best in an environment where a lot of routines are established so that she can follow a regular pattern for her daily or weekly routines.   Follow-up  Given evidence of cognitive impairment on current testing and potential for further decline, re-evaluation in 12-18 months is recommended. The current test data can be used as a baseline to which future comparisons can be made.

## 2022-06-28 DIAGNOSIS — G40.909 NONINTRACTABLE EPILEPSY WITHOUT STATUS EPILEPTICUS, UNSPECIFIED EPILEPSY TYPE (H): ICD-10-CM

## 2022-06-29 RX ORDER — PHENYTOIN SODIUM 100 MG/1
CAPSULE, EXTENDED RELEASE ORAL
Qty: 270 CAPSULE | Refills: 1 | Status: SHIPPED | OUTPATIENT
Start: 2022-06-29 | End: 2022-12-22

## 2022-06-29 NOTE — TELEPHONE ENCOUNTER
"Routing refill request to provider for review/approval because:  Drug level    Last Written Prescription Date:  12/30/21  Last Fill Quantity: 270,  # refills: 1   Last office visit provider:  2/21/22     Requested Prescriptions   Pending Prescriptions Disp Refills     phenytoin (DILANTIN) 100 MG capsule [Pharmacy Med Name: PHENYTOIN SODIUM 100MG EXT CAPSULES] 270 capsule 1     Sig: TAKE 2 CAPSULES BY MOUTH EVERY MORNING AND 1 CAPSULE EVERY EVENING       Anti-Seizure Meds Protocol  Failed - 6/29/2022  7:04 AM        Failed - Review Authorizing provider's last note.      Refer to last progress notes: confirm request is for original authorizing provider (cannot be through other providers).          Failed - Dilantin level within therapeutic range in past 26 months     No lab results found.    Dilantin level must be checked 2-4 weeks after dosage change.          Passed - Recent (12 mo) or future (30 days) visit within the authorizing provider's specialty     Patient has had an office visit with the authorizing provider or a provider within the authorizing providers department within the previous 12 mos or has a future within next 30 days. See \"Patient Info\" tab in inbasket, or \"Choose Columns\" in Meds & Orders section of the refill encounter.              Passed - Normal CBC on file in past 26 months     Recent Labs   Lab Test 11/15/21  1740   WBC 7.3   RBC 4.54   HGB 14.7   HCT 45.8                    Passed - Normal ALT or AST on file in past 26 months     Recent Labs   Lab Test 06/01/21  1238   ALT 17     Recent Labs   Lab Test 06/01/21  1238   AST 19             Passed - Normal platelet count on file in past 26 months     Recent Labs   Lab Test 11/15/21  1740                  Passed - Medication is active on med list        Passed - No active pregnancy on record        Passed - No positive pregnancy test in last 12 months             Art Collazo RN 06/29/22 7:04 AM  "

## 2022-07-15 PROBLEM — I99.9 MILD VASCULAR NEUROCOGNITIVE DISORDER: Status: ACTIVE | Noted: 2018-12-11

## 2022-07-15 PROBLEM — F06.70 MILD VASCULAR NEUROCOGNITIVE DISORDER: Status: ACTIVE | Noted: 2018-12-11

## 2022-08-16 ENCOUNTER — OFFICE VISIT (OUTPATIENT)
Dept: INTERNAL MEDICINE | Facility: CLINIC | Age: 77
End: 2022-08-16
Payer: COMMERCIAL

## 2022-08-16 VITALS
DIASTOLIC BLOOD PRESSURE: 66 MMHG | TEMPERATURE: 98.2 F | SYSTOLIC BLOOD PRESSURE: 130 MMHG | HEART RATE: 73 BPM | BODY MASS INDEX: 21.46 KG/M2 | OXYGEN SATURATION: 100 % | RESPIRATION RATE: 18 BRPM | WEIGHT: 125 LBS

## 2022-08-16 DIAGNOSIS — R21 RASH AND NONSPECIFIC SKIN ERUPTION: Primary | ICD-10-CM

## 2022-08-16 PROCEDURE — 99213 OFFICE O/P EST LOW 20 MIN: CPT | Performed by: NURSE PRACTITIONER

## 2022-08-16 RX ORDER — HYDROCHLOROTHIAZIDE 25 MG/1
25 TABLET ORAL DAILY
Qty: 90 TABLET | Refills: 3 | Status: SHIPPED | OUTPATIENT
Start: 2022-08-16 | End: 2023-04-11

## 2022-08-16 RX ORDER — TRIAMCINOLONE ACETONIDE 5 MG/G
OINTMENT TOPICAL
Qty: 15 G | Refills: 0 | Status: SHIPPED | OUTPATIENT
Start: 2022-08-16 | End: 2023-10-17

## 2022-08-16 RX ORDER — HYDROXYZINE HYDROCHLORIDE 25 MG/1
25-50 TABLET, FILM COATED ORAL EVERY 4 HOURS PRN
Qty: 30 TABLET | Refills: 0 | Status: SHIPPED | OUTPATIENT
Start: 2022-08-16

## 2022-08-16 ASSESSMENT — PAIN SCALES - GENERAL: PAINLEVEL: SEVERE PAIN (6)

## 2022-08-16 NOTE — PROGRESS NOTES
Assessment & Plan   Problem List Items Addressed This Visit    None     Visit Diagnoses     Rash and nonspecific skin eruption    -  Primary    Relevant Medications    triamcinolone (KENALOG) 0.5 % external ointment    hydrOXYzine (ATARAX) 25 MG tablet         My initial impression was that the rash looks like bed bugs but she has not had new exposures and symptoms started before she traveled. She does have several vesicular lesions, may be a viral rash. We will plan to treat the pruritis with a combination of steroid cream and hydroxyzine particularly for use at night. I will check in with her in 1 week by telephone.        Nicotine/Tobacco Cessation:  She reports that she has been smoking cigarettes and cigarettes. She has a 25.00 pack-year smoking history. She has never used smokeless tobacco.  Nicotine/Tobacco Cessation Plan:       Return in about 1 week (around 8/23/2022) for Follow up.    Luz Cedillo NP  [facility] EVAN Harris is a 77 year old, presenting for the following health issues:  Rash    She first started to get several rashes on the right upper thigh and then it moved to the lower right leg and then to the left leg. She traveled yesterday to [place] but no other recent travel. Her boyfriend does not have a rash. The rash spares the face, palms, and bottoms of the feet.       HPI     Rash  Onset/Duration: 1 week  Description  Location: legs and feet  Character: round, raised, painful, red  Itching: severe at night  Intensity:  severe  Progression of Symptoms:  worsening  Accompanying signs and symptoms:   Fever: No  Body aches or joint pain: No  Sore throat symptoms: No  Recent cold symptoms: No  History:           Previous episodes of similar rash: None  New exposures:  None  Recent travel: YES yesterday came back from [place]  Exposure to similar rash: No  Precipitating or alleviating factors: No  Therapies tried and outcome: triam cream did nothing, OTC anti itch  and nothing          Objective    /66 (BP Location: Right arm, Patient Position: Sitting, Cuff Size: Adult Regular)   Pulse 73   Temp 98.2  F (36.8  C) (Oral)   Resp 18   Wt 56.7 kg (125 lb)   SpO2 100%   BMI 21.46 kg/m    Body mass index is 21.46 kg/m .           Physical Exam                        .  ..

## 2022-08-24 ENCOUNTER — TELEPHONE (OUTPATIENT)
Dept: INTERNAL MEDICINE | Facility: CLINIC | Age: 77
End: 2022-08-24

## 2022-08-24 NOTE — TELEPHONE ENCOUNTER
"Called pt- her rash is \"drying up\" and no longer itchy. No new lesions. No constitutional symptoms. She does not have any further questions.   "

## 2022-09-15 ENCOUNTER — THERAPY VISIT (OUTPATIENT)
Dept: SLEEP MEDICINE | Facility: CLINIC | Age: 77
End: 2022-09-15
Attending: INTERNAL MEDICINE
Payer: COMMERCIAL

## 2022-09-15 DIAGNOSIS — G47.33 OSA (OBSTRUCTIVE SLEEP APNEA): ICD-10-CM

## 2022-09-15 PROCEDURE — 95810 POLYSOM 6/> YRS 4/> PARAM: CPT | Performed by: INTERNAL MEDICINE

## 2022-09-27 ENCOUNTER — TELEPHONE (OUTPATIENT)
Dept: SLEEP MEDICINE | Facility: CLINIC | Age: 77
End: 2022-09-27

## 2022-09-27 DIAGNOSIS — I50.9 CHRONIC HEART FAILURE, UNSPECIFIED HEART FAILURE TYPE (H): Primary | ICD-10-CM

## 2022-09-27 LAB — SLPCOMP: NORMAL

## 2022-09-27 NOTE — CONFIDENTIAL NOTE
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

## 2022-09-27 NOTE — TELEPHONE ENCOUNTER
Call placed to discuss order from Dr. Gold for echocardiogram. No answer on either phone number. MyChart has not been set up.     Isabella Arreola RN on 9/27/2022 at 1:07 PM

## 2022-10-03 ENCOUNTER — TELEPHONE (OUTPATIENT)
Dept: INTERNAL MEDICINE | Facility: CLINIC | Age: 77
End: 2022-10-03

## 2022-10-03 ENCOUNTER — TELEPHONE (OUTPATIENT)
Dept: SLEEP MEDICINE | Facility: CLINIC | Age: 77
End: 2022-10-03

## 2022-10-03 NOTE — TELEPHONE ENCOUNTER
pt called and wanted info on her next apt. I was giving her the info and she told me she might not have video for her apt but shes going to ask her boyfriend if hes phone can do it. she is also okay with over the phone call apt. PT cell is okay to call for apt [phone].

## 2022-10-10 NOTE — TELEPHONE ENCOUNTER
Called [facility] Cardiology dept. And scheduled a complete Echo appointment per Dr. Gold's orders for 1 Nov.22 and scheduled a PSG follow up on 30 Nov. 22 with Dr. Gold. Called Kimberly and left a  M with all the details of both appointments.    Joey Alvarez CMA

## 2022-10-11 ENCOUNTER — OFFICE VISIT (OUTPATIENT)
Dept: FAMILY MEDICINE | Facility: CLINIC | Age: 77
End: 2022-10-11
Payer: COMMERCIAL

## 2022-10-11 VITALS
SYSTOLIC BLOOD PRESSURE: 138 MMHG | HEART RATE: 80 BPM | DIASTOLIC BLOOD PRESSURE: 2 MMHG | OXYGEN SATURATION: 96 % | TEMPERATURE: 98.1 F | RESPIRATION RATE: 20 BRPM

## 2022-10-11 DIAGNOSIS — L02.31 ABSCESS OF BUTTOCK, LEFT: Primary | ICD-10-CM

## 2022-10-11 PROCEDURE — 10060 I&D ABSCESS SIMPLE/SINGLE: CPT | Performed by: PHYSICIAN ASSISTANT

## 2022-10-11 PROCEDURE — 99213 OFFICE O/P EST LOW 20 MIN: CPT | Mod: 25 | Performed by: PHYSICIAN ASSISTANT

## 2022-10-11 RX ORDER — CEPHALEXIN 500 MG/1
500 CAPSULE ORAL 3 TIMES DAILY
Qty: 21 CAPSULE | Refills: 0 | Status: SHIPPED | OUTPATIENT
Start: 2022-10-11 | End: 2022-10-18

## 2022-10-11 RX ORDER — CLOBETASOL PROPIONATE 0.5 MG/G
OINTMENT TOPICAL
COMMUNITY
Start: 2022-08-21 | End: 2023-10-17

## 2022-10-11 NOTE — PATIENT INSTRUCTIONS
Patient was educated on the natural course of condition.  Conservative measures discussed including warm compresses, and over-the-counter analgesics (Tylenol or Ibuprofen).  Keep wound clean and dry. See your primary care provider if symptoms worsen or do not improve in 7 days. Seek emergency care if you develop fever, severe swelling, or increased redness.

## 2022-10-11 NOTE — PROGRESS NOTES
URGENT CARE VISIT:    SUBJECTIVE:   Kimberly Hernandez is a 77 year old female who presents to the clinic with an abscess located on left buttock that started 3 day(s) ago. Associated symptoms include erythema, fluctuance and pain.  Denies fever, chills and drainage. Symptoms have been worsening since onset. Treatments tried include none with no relief of symptoms.       OBJECTIVE:  The patient appears today in alert,no apparent distress distress  VITALS: BP (!) 138/2 (BP Location: Right arm, Patient Position: Sitting, Cuff Size: Adult Regular)   Pulse 80   Temp 98.1  F (36.7  C) (Oral)   Resp 20   SpO2 96%   General: WDWN in NAD  Musculoskeletal: moderate ttp over left medial inferior buttock  Skin: Erythematous, fluctuant nodule with raised head is visualized over left medial inferior buttock. It is 4 cm in size.   Neurology: Sensation to light touch intact  No images are attached to the encounter.    ASSESSMENT:    ICD-10-CM    1. Abscess of buttock, left  L02.31 cephALEXin (KEFLEX) 500 MG capsule     DRAIN SKIN ABSCESS SIMPLE/SINGLE          PLAN:  PROCEDURE NOTE:  Affected area was cleaned with betadine x 3. Area was locally infiltrated with 4 cc's of Lidocaine 1% with epinephrine with good anesthesia obtained.  Number 11 scalpel was used to make a small incision. Copious purulent drainage was released. Area was irrigated with saline. Appropriate wound care dressing applied.  Patient tolerated the procedure well.     Patient Instructions   Patient was educated on the natural course of condition.  Conservative measures discussed including warm compresses, and over-the-counter analgesics (Tylenol or Ibuprofen).  Keep wound clean and dry. See your primary care provider if symptoms worsen or do not improve in 7 days. Seek emergency care if you develop fever, severe swelling, or increased redness.         Alaina Farooq PA-C ....................  10/11/2022   12:53 PM

## 2022-10-19 ENCOUNTER — NURSE TRIAGE (OUTPATIENT)
Dept: NURSING | Facility: CLINIC | Age: 77
End: 2022-10-19

## 2022-10-19 NOTE — TELEPHONE ENCOUNTER
[place].  She is needing vaccinations for Malaria and Yellow Fever.  Patient is wondering how she can get these?  Can she come to clinic for these?    Will route to clinic to follow up with patient.    Nadine Telles RN   10/19/22 5:30 PM  [facility] Nurse Advisor    Reason for Disposition    [1] Caller requesting NON-URGENT health information AND [2] PCP's office is the best resource    Additional Information    Negative: [1] Caller is not with the adult (patient) AND [2] reporting urgent symptoms    Negative: Lab result questions    Negative: Medication questions    Negative: Caller can't be reached by phone    Negative: Caller has already spoken to PCP or another triager    Negative: RN needs further essential information from caller in order to complete triage    Negative: Requesting regular office appointment    Protocols used: INFORMATION ONLY CALL - NO TRIAGE-A-

## 2022-10-19 NOTE — TELEPHONE ENCOUNTER
We do not have the yellow fever vaccine so I recommend seeing the travel clinic for a travel consult.

## 2022-10-20 NOTE — TELEPHONE ENCOUNTER
Detailed message left for patient at phone number [phone] in regards to vaccines she is needing for travel.     Message included information to the [facility] [place] for travel immunizations.

## 2022-11-01 ENCOUNTER — HOSPITAL ENCOUNTER (OUTPATIENT)
Dept: CARDIOLOGY | Facility: HOSPITAL | Age: 77
Discharge: HOME | End: 2022-11-01
Attending: INTERNAL MEDICINE | Admitting: INTERNAL MEDICINE
Payer: COMMERCIAL

## 2022-11-01 DIAGNOSIS — I50.9 CHRONIC HEART FAILURE, UNSPECIFIED HEART FAILURE TYPE (H): ICD-10-CM

## 2022-11-01 LAB — LVEF ECHO: NORMAL

## 2022-11-01 PROCEDURE — 93306 TTE W/DOPPLER COMPLETE: CPT

## 2022-11-01 PROCEDURE — 93306 TTE W/DOPPLER COMPLETE: CPT | Mod: 26 | Performed by: INTERNAL MEDICINE

## 2022-11-28 DIAGNOSIS — G47.31 CENTRAL SLEEP APNEA: Primary | ICD-10-CM

## 2022-11-28 NOTE — CONFIDENTIAL NOTE
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  Patient is interested in pursuing adaptive servo ventilation for management of sleep apnea and will be scheduled for overnight titration study with ASV.  The purpose of ASV would be to improve her nonrestorative sleep and sleep disruption.    Overnight ASV titration study ordered    Patient will be rescheduled for 2 months    Con MD Alla

## 2022-11-28 NOTE — Clinical Note
Orders placed for overnight titration study Please cancel appointment in November 30 and reschedule approximately 2 months from now

## 2022-12-21 DIAGNOSIS — G40.909 NONINTRACTABLE EPILEPSY WITHOUT STATUS EPILEPTICUS, UNSPECIFIED EPILEPSY TYPE (H): ICD-10-CM

## 2022-12-22 RX ORDER — PHENYTOIN SODIUM 100 MG/1
200 CAPSULE, EXTENDED RELEASE ORAL 2 TIMES DAILY
Qty: 120 CAPSULE | Refills: 0 | Status: SHIPPED | OUTPATIENT
Start: 2022-12-22 | End: 2023-03-21

## 2022-12-22 NOTE — TELEPHONE ENCOUNTER
Call pt- she is due for an appointment and lab work related to her recent medication refill Dilantin. Please schedule within the next 1 month

## 2022-12-22 NOTE — TELEPHONE ENCOUNTER
"Routing refill request to provider for review/approval because:  Review Authorizing provider's last note.  Dilantin level not within therapeutic range.  Please verify dose. Discrepancy.  Pt reported not taking 10/11/22     Last Written Prescription Date:  6/29/22  Last Fill Quantity: 270,  # refills: 1   Last office visit provider:   8/16/22    Requested Prescriptions   Pending Prescriptions Disp Refills     phenytoin (DILANTIN) 100 MG ER capsule [Pharmacy Med Name: Phenytoin Sodium Extended Oral Capsule 100 MG] 360 capsule 0     Sig: TAKE TWO CAPSULES BY MOUTH TWICE DAILY       Anti-Seizure Meds Protocol  Failed - 12/21/2022 11:40 AM        Failed - Review Authorizing provider's last note.      Refer to last progress notes: confirm request is for original authorizing provider (cannot be through other providers).          Failed - Dilantin level within therapeutic range in past 26 months     No lab results found.    Dilantin level must be checked 2-4 weeks after dosage change.          Passed - Recent (12 mo) or future (30 days) visit within the authorizing provider's specialty     Patient has had an office visit with the authorizing provider or a provider within the authorizing providers department within the previous 12 mos or has a future within next 30 days. See \"Patient Info\" tab in inbasket, or \"Choose Columns\" in Meds & Orders section of the refill encounter.              Passed - Normal CBC on file in past 26 months     Recent Labs   Lab Test 11/15/21  1740   WBC 7.3   RBC 4.54   HGB 14.7   HCT 45.8                    Passed - Normal ALT or AST on file in past 26 months     Recent Labs   Lab Test 06/01/21  1238   ALT 17     Recent Labs   Lab Test 06/01/21  1238   AST 19             Passed - Normal platelet count on file in past 26 months     Recent Labs   Lab Test 11/15/21  1740                  Passed - Medication is active on med list        Passed - No active pregnancy on record        " Passed - No positive pregnancy test in last 12 months             Halie Gage, VIVIANA 12/22/22 2:35 PM

## 2023-01-05 NOTE — ADDENDUM NOTE
Encounter addended by: Queta Hull, PT on: 1/5/2023 4:14 PM   Actions taken: Clinical Note Signed, Episode resolved

## 2023-01-05 NOTE — PROGRESS NOTES
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  She has been getting radiating pain now into the R leg which started 2-3 weeks ago.  This occurs when she walks, especially longer distances.  All of her pain is in the hip and leg, nothing in the lower back.  It is just on the R side.  She has been trying to go to the [facility] and walking as well as doing machines.  She is doing her PT exercises 1x/day.    Objective Measurements:  Objective Measure: Lumbar ROM  Details: WNL without pain  Objective Measure: Palpation  Details: Tenderness/TPs noted in hip musculature R     Goals:  Goal Identifier hep   Goal Description Patient will verbalize and demonstrate understanding of home exercise program to reach functional goals   Target Date 22   Date Met      Progress (detail required for progress note):  Not Met     Goal Identifier walking   Goal Description Patient will be able to walk more than 5 laps around a track without pain to return to prior level of function.   Target Date 22   Date Met      Progress (detail required for progress note):  Not Met     Goal Identifier weight machines   Goal Description Patient will report being able to exercise on weight machines without pain to return to prior level of function.   Target Date 22   Date Met      Progress (detail required for progress note):  Not Met     Goal Identifier symptom reduction   Goal Description Patient will report >50% reduction in symptoms to demonstrate improved quality of life.   Target Date 22   Date  Met      Progress (detail required for progress note):  Not Met     Plan:  Discharge from therapy.    Discharge:    Reason for Discharge: The patient cancelled visits d/t having covid and then did not return to PT.  She is appropriate for discharge from skilled PT services at this time.    Equipment Issued: Resistance band    Discharge Plan: Patient to continue home program.    Queta Hull, PT, DPT, MHA  1/5/2023

## 2023-03-21 DIAGNOSIS — G40.909 NONINTRACTABLE EPILEPSY WITHOUT STATUS EPILEPTICUS, UNSPECIFIED EPILEPSY TYPE (H): ICD-10-CM

## 2023-03-21 RX ORDER — PHENYTOIN SODIUM 100 MG/1
CAPSULE, EXTENDED RELEASE ORAL
Qty: 180 CAPSULE | Refills: 0 | Status: SHIPPED | OUTPATIENT
Start: 2023-03-21 | End: 2023-04-11

## 2023-03-21 NOTE — TELEPHONE ENCOUNTER
Call pt- she is due for a follow up appointment and lab work. Please schedule to avoid delays in her medication refills.

## 2023-03-21 NOTE — TELEPHONE ENCOUNTER
"Routing refill request to provider for review/approval because:  Will defer to provider as she has not had a level done recently    Last Written Prescription Date:  12/22/22  Last Fill Quantity: 120,  # refills: 0   Last office visit provider:  8/16/22     Requested Prescriptions   Pending Prescriptions Disp Refills     phenytoin (DILANTIN) 100 MG ER capsule [Pharmacy Med Name: PHENYTOIN SODIUM 100MG EXT CAPSULES] 270 capsule      Sig: TAKE 2 CAPSULES BY MOUTH EVERY MORNING AND 1 CAPSULE EVERY EVENING.       Anti-Seizure Meds Protocol  Failed - 3/21/2023  3:54 AM        Failed - Review Authorizing provider's last note.      Refer to last progress notes: confirm request is for original authorizing provider (cannot be through other providers).          Failed - Dilantin level within therapeutic range in past 26 months     No lab results found.    Dilantin level must be checked 2-4 weeks after dosage change.          Passed - Recent (12 mo) or future (30 days) visit within the authorizing provider's specialty     Patient has had an office visit with the authorizing provider or a provider within the authorizing providers department within the previous 12 mos or has a future within next 30 days. See \"Patient Info\" tab in inbasket, or \"Choose Columns\" in Meds & Orders section of the refill encounter.              Passed - Normal CBC on file in past 26 months     Recent Labs   Lab Test 11/15/21  1740   WBC 7.3   RBC 4.54   HGB 14.7   HCT 45.8                    Passed - Normal ALT or AST on file in past 26 months     Recent Labs   Lab Test 06/01/21  1238   ALT 17     Recent Labs   Lab Test 06/01/21  1238   AST 19             Passed - Normal platelet count on file in past 26 months     Recent Labs   Lab Test 11/15/21  1740                  Passed - Medication is active on med list        Passed - No active pregnancy on record        Passed - No positive pregnancy test in last 12 months             Nadine" VIVIANA Campos 03/21/23 2:03 PM

## 2023-04-11 ENCOUNTER — OFFICE VISIT (OUTPATIENT)
Dept: INTERNAL MEDICINE | Facility: CLINIC | Age: 78
End: 2023-04-11
Payer: COMMERCIAL

## 2023-04-11 VITALS
OXYGEN SATURATION: 96 % | WEIGHT: 123 LBS | HEART RATE: 59 BPM | SYSTOLIC BLOOD PRESSURE: 142 MMHG | DIASTOLIC BLOOD PRESSURE: 60 MMHG | BODY MASS INDEX: 21.11 KG/M2

## 2023-04-11 DIAGNOSIS — I73.9 CLAUDICATION OF BOTH LOWER EXTREMITIES (H): Primary | ICD-10-CM

## 2023-04-11 DIAGNOSIS — I10 ESSENTIAL HYPERTENSION: ICD-10-CM

## 2023-04-11 DIAGNOSIS — Z78.0 POST-MENOPAUSAL: ICD-10-CM

## 2023-04-11 DIAGNOSIS — E83.52 HYPERCALCEMIA: ICD-10-CM

## 2023-04-11 DIAGNOSIS — G40.909 NONINTRACTABLE EPILEPSY WITHOUT STATUS EPILEPTICUS, UNSPECIFIED EPILEPSY TYPE (H): ICD-10-CM

## 2023-04-11 DIAGNOSIS — Z13.220 LIPID SCREENING: ICD-10-CM

## 2023-04-11 DIAGNOSIS — L82.1 SEBORRHEIC KERATOSES: ICD-10-CM

## 2023-04-11 DIAGNOSIS — R74.8 ELEVATED ALKALINE PHOSPHATASE LEVEL: ICD-10-CM

## 2023-04-11 DIAGNOSIS — F06.70 MILD VASCULAR NEUROCOGNITIVE DISORDER: ICD-10-CM

## 2023-04-11 DIAGNOSIS — I99.9 MILD VASCULAR NEUROCOGNITIVE DISORDER: ICD-10-CM

## 2023-04-11 DIAGNOSIS — I67.1 CEREBRAL ANEURYSM, NONRUPTURED: ICD-10-CM

## 2023-04-11 PROBLEM — L40.9 PSORIASIS: Status: ACTIVE | Noted: 2023-04-11

## 2023-04-11 LAB
ALBUMIN SERPL BCG-MCNC: 4.4 G/DL (ref 3.5–5.2)
ALP SERPL-CCNC: 141 U/L (ref 35–104)
ALT SERPL W P-5'-P-CCNC: 18 U/L (ref 10–35)
ANION GAP SERPL CALCULATED.3IONS-SCNC: 15 MMOL/L (ref 7–15)
AST SERPL W P-5'-P-CCNC: 26 U/L (ref 10–35)
BILIRUB SERPL-MCNC: 0.2 MG/DL
BUN SERPL-MCNC: 18.9 MG/DL (ref 8–23)
CALCIUM SERPL-MCNC: 10.3 MG/DL (ref 8.8–10.2)
CHLORIDE SERPL-SCNC: 104 MMOL/L (ref 98–107)
CHOLEST SERPL-MCNC: 229 MG/DL
CREAT SERPL-MCNC: 0.86 MG/DL (ref 0.51–0.95)
DEPRECATED HCO3 PLAS-SCNC: 25 MMOL/L (ref 22–29)
ERYTHROCYTE [DISTWIDTH] IN BLOOD BY AUTOMATED COUNT: 13.2 % (ref 10–15)
GFR SERPL CREATININE-BSD FRML MDRD: 70 ML/MIN/1.73M2
GLUCOSE SERPL-MCNC: 107 MG/DL (ref 70–99)
HCT VFR BLD AUTO: 45.9 % (ref 35–47)
HDLC SERPL-MCNC: 88 MG/DL
HGB BLD-MCNC: 15.1 G/DL (ref 11.7–15.7)
LDLC SERPL CALC-MCNC: 123 MG/DL
MCH RBC QN AUTO: 33.1 PG (ref 26.5–33)
MCHC RBC AUTO-ENTMCNC: 32.9 G/DL (ref 31.5–36.5)
MCV RBC AUTO: 101 FL (ref 78–100)
NONHDLC SERPL-MCNC: 141 MG/DL
PHENYTOIN SERPL-MCNC: 13.1 UG/ML
PLATELET # BLD AUTO: 243 10E3/UL (ref 150–450)
POTASSIUM SERPL-SCNC: 5 MMOL/L (ref 3.4–5.3)
PROT SERPL-MCNC: 7.6 G/DL (ref 6.4–8.3)
RBC # BLD AUTO: 4.56 10E6/UL (ref 3.8–5.2)
SODIUM SERPL-SCNC: 144 MMOL/L (ref 136–145)
TRIGL SERPL-MCNC: 90 MG/DL
WBC # BLD AUTO: 8.1 10E3/UL (ref 4–11)

## 2023-04-11 PROCEDURE — 36415 COLL VENOUS BLD VENIPUNCTURE: CPT | Performed by: NURSE PRACTITIONER

## 2023-04-11 PROCEDURE — 83970 ASSAY OF PARATHORMONE: CPT | Performed by: NURSE PRACTITIONER

## 2023-04-11 PROCEDURE — 80185 ASSAY OF PHENYTOIN TOTAL: CPT | Performed by: NURSE PRACTITIONER

## 2023-04-11 PROCEDURE — 85027 COMPLETE CBC AUTOMATED: CPT | Performed by: NURSE PRACTITIONER

## 2023-04-11 PROCEDURE — 82977 ASSAY OF GGT: CPT | Performed by: NURSE PRACTITIONER

## 2023-04-11 PROCEDURE — 80061 LIPID PANEL: CPT | Performed by: NURSE PRACTITIONER

## 2023-04-11 PROCEDURE — 99214 OFFICE O/P EST MOD 30 MIN: CPT | Performed by: NURSE PRACTITIONER

## 2023-04-11 PROCEDURE — 80053 COMPREHEN METABOLIC PANEL: CPT | Performed by: NURSE PRACTITIONER

## 2023-04-11 RX ORDER — HYDROCHLOROTHIAZIDE 25 MG/1
25 TABLET ORAL DAILY
Qty: 90 TABLET | Refills: 3 | Status: SHIPPED | OUTPATIENT
Start: 2023-04-11 | End: 2024-03-14

## 2023-04-11 RX ORDER — PHENYTOIN SODIUM 100 MG/1
CAPSULE, EXTENDED RELEASE ORAL
Qty: 270 CAPSULE | Refills: 1 | Status: SHIPPED | OUTPATIENT
Start: 2023-04-11 | End: 2023-06-14

## 2023-04-11 RX ORDER — AMLODIPINE BESYLATE 5 MG/1
5 TABLET ORAL DAILY
Qty: 90 TABLET | Refills: 0 | Status: SHIPPED | OUTPATIENT
Start: 2023-04-11 | End: 2023-05-08

## 2023-04-11 ASSESSMENT — PAIN SCALES - GENERAL: PAINLEVEL: NO PAIN (0)

## 2023-04-11 NOTE — PROGRESS NOTES
Assessment & Plan   Problem List Items Addressed This Visit        Nervous and Auditory    Epilepsy And Recurrent Seizures     Stable with phenytoin.  Phenytoin level today.         Relevant Medications    phenytoin (DILANTIN) 100 MG ER capsule    Other Relevant Orders    CBC with platelets (Completed)    Comprehensive metabolic panel (Completed)    Phenytoin level (Completed)    Mild vascular neurocognitive disorder     Continue aggressive vascular risk factor management.  She is on a high intensity statin, atorvastatin 40 mg.  Encouraged tobacco cessation but she is not ready for this.  She will continue with aspirin 81 mg.  Blood pressure is elevated today, we will follow-up on this in 6 weeks.  If readings remain elevated, consider increasing amlodipine to 10 mg.            Circulatory    Cerebral aneurysm, nonruptured- last imaging stable 4/2021     Last imaging was in April 2021.  Discussed follow-up imaging with patient at next visit in 6 weeks.         Essential hypertension     Not at goal.  Repeat blood pressure in 6 weeks, consider increasing her dose of amlodipine         Relevant Medications    hydrochlorothiazide (HYDRODIURIL) 25 MG tablet    amLODIPine (NORVASC) 5 MG tablet       Musculoskeletal and Integumentary    Seborrheic keratoses     Many large lesions noted on the chest.  She will be referred to dermatology.         Relevant Orders    Adult Dermatology Referral   Other Visit Diagnoses     Claudication of both lower extremities (H)    -  Primary    Relevant Orders    US ANNA Doppler with Exercise Bilateral    Post-menopausal        Relevant Orders    DX Hip/Pelvis/Spine    Lipid screening        Relevant Orders    Lipid panel reflex to direct LDL Non-fasting (Completed)    Elevated alkaline phosphatase level        Relevant Orders    GGT (Completed)    Hypercalcemia        Relevant Orders    Parathyroid Hormone Intact (Completed)         See Patient Instructions    Luz Cedillo NP  [facility]  [facility] EVAN Harris is a 77 year old, presenting for the following health issues:  Bilateral Leg Pain and Right Breast seborrheic keratosis    HPI     Bilateral Leg Pain  Patient reports bilateral leg pain onset 6 months ago. Reports pain is dull, aggravated by exercise or physical exertion, and relieved by rest. Reports not being able to walk the same distance or speed as previously. Denies radiating pain or numbness and tingling to feet or toes. Denies previous similar symptoms. Reports pain 8/10 and pain negatively impacts patients life.     Right Breast seborrheic keratosis  Patient reports Right breat seborrheic keratosis has been present for many years but has recently grown in size over the past few months. Patient denies seborrheic keratosis or surrounding skin pain, itching, scaling, or bleeding. Denies change in seborrheic keratosis color. Denies history of skin cancer or family history of melanoma.     Review of Systems   CONSTITUTIONAL: NEGATIVE for fever, chills, change in weight  INTEGUMENTARY/SKIN: POSITIVE for Right Breast change in seborrheic keratosis size  BREAST: NEGATIVE for masses, tenderness or discharge  CV: NEGATIVE for chest pain, palpitations or peripheral edema  MUSCULOSKELETAL: NEGATIVE for significant arthralgias. POSITIVE for bilateral leg pain.  NEURO: NEGATIVE for weakness, dizziness or paresthesias      Objective    BP (!) 142/60 (BP Location: Right arm, Patient Position: Sitting, Cuff Size: Adult Regular)   Pulse 59   Wt 55.8 kg (123 lb)   SpO2 96%   BMI 21.11 kg/m    Body mass index is 21.11 kg/m .     Physical Exam   GENERAL: healthy, alert and no distress  RESP: lungs clear to auscultation - no rales, rhonchi or wheezes  CV: regular rate and rhythm, normal S1 S2, no S3 or S4, no murmur, click or rub, no peripheral edema, right DP pulse 1+, left DP pulse 2+, PT pulses 2+. Toes are cool but not cold, capillary refill is brisk. No dependent rubor.    ABDOMEN: soft, nontender, no hepatosplenomegaly, no masses and bowel sounds normal  MS: no gross musculoskeletal defects noted, no edema. Bilateral legs full ROM, non-tender to palpation.   SKIN: bilateral lower extremities warm, dry, and scaly, with decreased hair growth. No LE ulcerations or wounds. No dependent rubor. Right lower outer breast brown, raised, verrucous lesion with a well-demarcated round border, 1 inch in diameter c/w seborrheic keratosis. Several other large similar appearing lesions are noted on the chest, abdomen, and breasts.  NEURO: Normal strength and tone, mentation intact and speech normal

## 2023-04-12 DIAGNOSIS — R74.8 ELEVATED ALKALINE PHOSPHATASE LEVEL: Primary | ICD-10-CM

## 2023-04-12 DIAGNOSIS — E78.2 MIXED HYPERLIPIDEMIA: ICD-10-CM

## 2023-04-12 LAB
GGT SERPL-CCNC: 120 U/L (ref 5–36)
PTH-INTACT SERPL-MCNC: 29 PG/ML (ref 15–65)

## 2023-04-12 RX ORDER — ATORVASTATIN CALCIUM 40 MG/1
40 TABLET, FILM COATED ORAL DAILY
Qty: 90 TABLET | Refills: 0 | Status: SHIPPED | OUTPATIENT
Start: 2023-04-12 | End: 2023-05-08

## 2023-04-13 ENCOUNTER — TELEPHONE (OUTPATIENT)
Dept: INTERNAL MEDICINE | Facility: CLINIC | Age: 78
End: 2023-04-13
Payer: COMMERCIAL

## 2023-04-13 NOTE — TELEPHONE ENCOUNTER
----- Message from Luz Cedillo NP sent at 4/12/2023  3:17 PM CDT -----  Call patient: There are 2 abnormalities on her lab test that I would like to have her follow-up on.  The first is that she has an elevated alkaline phosphatase level.  This appears to relate to a liver issue based on a separate lab test.  I would like to have her do a liver ultrasound.  This test has been ordered, she will receive a phone call to schedule or she can call [phone].    The second issue is her cholesterol is very elevated.  She used to take atorvastatin but I am not certain why she stopped this medication.  I sent this to her pharmacy to restart.  If she has any concerns or questions, please let me know.  We should retest her cholesterol in 2 months.

## 2023-04-13 NOTE — TELEPHONE ENCOUNTER
Called patient and informed her of the results/message below. Patient has her ultrasound scheduled and will call back to get her lab only appointment scheduled.

## 2023-04-14 NOTE — ASSESSMENT & PLAN NOTE
Last imaging was in April 2021.  Discussed follow-up imaging with patient at next visit in 6 weeks.

## 2023-04-14 NOTE — ASSESSMENT & PLAN NOTE
Continue aggressive vascular risk factor management.  She is on a high intensity statin, atorvastatin 40 mg.  Encouraged tobacco cessation but she is not ready for this.  She will continue with aspirin 81 mg.  Blood pressure is elevated today, we will follow-up on this in 6 weeks.  If readings remain elevated, consider increasing amlodipine to 10 mg.

## 2023-04-26 ENCOUNTER — HOSPITAL ENCOUNTER (OUTPATIENT)
Dept: ULTRASOUND IMAGING | Facility: HOSPITAL | Age: 78
Discharge: HOME | End: 2023-04-26
Attending: NURSE PRACTITIONER
Payer: COMMERCIAL

## 2023-04-26 ENCOUNTER — HOSPITAL ENCOUNTER (OUTPATIENT)
Dept: BONE DENSITY | Facility: HOSPITAL | Age: 78
Discharge: HOME | End: 2023-04-26
Attending: NURSE PRACTITIONER
Payer: COMMERCIAL

## 2023-04-26 DIAGNOSIS — R74.8 ELEVATED ALKALINE PHOSPHATASE LEVEL: ICD-10-CM

## 2023-04-26 DIAGNOSIS — Z78.0 POST-MENOPAUSAL: ICD-10-CM

## 2023-04-26 PROCEDURE — 76705 ECHO EXAM OF ABDOMEN: CPT

## 2023-04-26 PROCEDURE — 77080 DXA BONE DENSITY AXIAL: CPT

## 2023-04-27 DIAGNOSIS — M85.851 OSTEOPENIA OF BOTH HIPS: Primary | ICD-10-CM

## 2023-04-27 DIAGNOSIS — R74.8 ELEVATED ALKALINE PHOSPHATASE MEASUREMENT: Primary | ICD-10-CM

## 2023-04-27 DIAGNOSIS — M85.852 OSTEOPENIA OF BOTH HIPS: Primary | ICD-10-CM

## 2023-04-27 RX ORDER — EPINEPHRINE 1 MG/ML
0.3 INJECTION, SOLUTION, CONCENTRATE INTRAVENOUS EVERY 5 MIN PRN
Status: CANCELLED | OUTPATIENT
Start: 2023-04-27

## 2023-04-27 RX ORDER — HEPARIN SODIUM,PORCINE 10 UNIT/ML
5 VIAL (ML) INTRAVENOUS
Status: CANCELLED | OUTPATIENT
Start: 2023-04-27

## 2023-04-27 RX ORDER — MEPERIDINE HYDROCHLORIDE 25 MG/ML
25 INJECTION INTRAMUSCULAR; INTRAVENOUS; SUBCUTANEOUS EVERY 30 MIN PRN
Status: CANCELLED | OUTPATIENT
Start: 2023-04-27

## 2023-04-27 RX ORDER — ZOLEDRONIC ACID 5 MG/100ML
5 INJECTION, SOLUTION INTRAVENOUS ONCE
Status: CANCELLED
Start: 2023-04-27

## 2023-04-27 RX ORDER — METHYLPREDNISOLONE SODIUM SUCCINATE 125 MG/2ML
125 INJECTION, POWDER, LYOPHILIZED, FOR SOLUTION INTRAMUSCULAR; INTRAVENOUS
Status: CANCELLED
Start: 2023-04-27

## 2023-04-27 RX ORDER — ALBUTEROL SULFATE 0.83 MG/ML
2.5 SOLUTION RESPIRATORY (INHALATION)
Status: CANCELLED | OUTPATIENT
Start: 2023-04-27

## 2023-04-27 RX ORDER — ALBUTEROL SULFATE 90 UG/1
1-2 AEROSOL, METERED RESPIRATORY (INHALATION)
Status: CANCELLED
Start: 2023-04-27

## 2023-04-27 RX ORDER — DIPHENHYDRAMINE HYDROCHLORIDE 50 MG/ML
50 INJECTION INTRAMUSCULAR; INTRAVENOUS
Status: CANCELLED
Start: 2023-04-27

## 2023-04-27 RX ORDER — ACETAMINOPHEN 325 MG/1
650 TABLET ORAL ONCE
Status: CANCELLED | OUTPATIENT
Start: 2023-04-27

## 2023-04-27 RX ORDER — HEPARIN SODIUM (PORCINE) LOCK FLUSH IV SOLN 100 UNIT/ML 100 UNIT/ML
5 SOLUTION INTRAVENOUS
Status: CANCELLED | OUTPATIENT
Start: 2023-04-27

## 2023-05-02 ENCOUNTER — TRANSFERRED RECORDS (OUTPATIENT)
Dept: HEALTH INFORMATION MANAGEMENT | Facility: CLINIC | Age: 78
End: 2023-05-02

## 2023-05-02 ENCOUNTER — INFUSION THERAPY VISIT (OUTPATIENT)
Dept: INFUSION THERAPY | Facility: HOSPITAL | Age: 78
End: 2023-05-02
Attending: NURSE PRACTITIONER
Payer: COMMERCIAL

## 2023-05-02 VITALS
OXYGEN SATURATION: 99 % | RESPIRATION RATE: 20 BRPM | SYSTOLIC BLOOD PRESSURE: 149 MMHG | TEMPERATURE: 97.9 F | HEIGHT: 64 IN | DIASTOLIC BLOOD PRESSURE: 65 MMHG | HEART RATE: 82 BPM | WEIGHT: 123 LBS | BODY MASS INDEX: 21 KG/M2

## 2023-05-02 DIAGNOSIS — M85.852 OSTEOPENIA OF BOTH HIPS: Primary | ICD-10-CM

## 2023-05-02 DIAGNOSIS — M85.851 OSTEOPENIA OF BOTH HIPS: Primary | ICD-10-CM

## 2023-05-02 PROCEDURE — 96365 THER/PROPH/DIAG IV INF INIT: CPT

## 2023-05-02 PROCEDURE — 250N000013 HC RX MED GY IP 250 OP 250 PS 637: Performed by: NURSE PRACTITIONER

## 2023-05-02 PROCEDURE — 258N000003 HC RX IP 258 OP 636: Performed by: NURSE PRACTITIONER

## 2023-05-02 PROCEDURE — 250N000011 HC RX IP 250 OP 636: Performed by: NURSE PRACTITIONER

## 2023-05-02 RX ORDER — HEPARIN SODIUM,PORCINE 10 UNIT/ML
5 VIAL (ML) INTRAVENOUS
Status: CANCELLED | OUTPATIENT
Start: 2023-05-02

## 2023-05-02 RX ORDER — EPINEPHRINE 1 MG/ML
0.3 INJECTION, SOLUTION INTRAMUSCULAR; SUBCUTANEOUS EVERY 5 MIN PRN
Status: CANCELLED | OUTPATIENT
Start: 2023-05-02

## 2023-05-02 RX ORDER — HEPARIN SODIUM (PORCINE) LOCK FLUSH IV SOLN 100 UNIT/ML 100 UNIT/ML
5 SOLUTION INTRAVENOUS
Status: CANCELLED | OUTPATIENT
Start: 2023-05-02

## 2023-05-02 RX ORDER — METHYLPREDNISOLONE SODIUM SUCCINATE 125 MG/2ML
125 INJECTION, POWDER, LYOPHILIZED, FOR SOLUTION INTRAMUSCULAR; INTRAVENOUS
Status: DISCONTINUED | OUTPATIENT
Start: 2023-05-02 | End: 2023-05-02

## 2023-05-02 RX ORDER — ALBUTEROL SULFATE 0.83 MG/ML
2.5 SOLUTION RESPIRATORY (INHALATION)
Status: DISCONTINUED | OUTPATIENT
Start: 2023-05-02 | End: 2023-05-02

## 2023-05-02 RX ORDER — ALBUTEROL SULFATE 0.83 MG/ML
2.5 SOLUTION RESPIRATORY (INHALATION)
Status: CANCELLED | OUTPATIENT
Start: 2023-05-02

## 2023-05-02 RX ORDER — ALBUTEROL SULFATE 90 UG/1
1-2 AEROSOL, METERED RESPIRATORY (INHALATION)
Status: DISCONTINUED | OUTPATIENT
Start: 2023-05-02 | End: 2023-05-02

## 2023-05-02 RX ORDER — MEPERIDINE HYDROCHLORIDE 25 MG/ML
25 INJECTION INTRAMUSCULAR; INTRAVENOUS; SUBCUTANEOUS EVERY 30 MIN PRN
Status: DISCONTINUED | OUTPATIENT
Start: 2023-05-02 | End: 2023-05-02

## 2023-05-02 RX ORDER — DIPHENHYDRAMINE HYDROCHLORIDE 50 MG/ML
50 INJECTION INTRAMUSCULAR; INTRAVENOUS
Status: CANCELLED
Start: 2023-05-02

## 2023-05-02 RX ORDER — ACETAMINOPHEN 325 MG/1
650 TABLET ORAL ONCE
Status: CANCELLED | OUTPATIENT
Start: 2023-05-02

## 2023-05-02 RX ORDER — EPINEPHRINE 1 MG/ML
0.3 INJECTION, SOLUTION INTRAMUSCULAR; SUBCUTANEOUS EVERY 5 MIN PRN
Status: DISCONTINUED | OUTPATIENT
Start: 2023-05-02 | End: 2023-05-02

## 2023-05-02 RX ORDER — ACETAMINOPHEN 325 MG/1
650 TABLET ORAL ONCE
Status: COMPLETED | OUTPATIENT
Start: 2023-05-02 | End: 2023-05-02

## 2023-05-02 RX ORDER — ZOLEDRONIC ACID 5 MG/100ML
5 INJECTION, SOLUTION INTRAVENOUS ONCE
Status: CANCELLED
Start: 2023-05-02

## 2023-05-02 RX ORDER — ZOLEDRONIC ACID 5 MG/100ML
5 INJECTION, SOLUTION INTRAVENOUS ONCE
Status: COMPLETED | OUTPATIENT
Start: 2023-05-02 | End: 2023-05-02

## 2023-05-02 RX ORDER — MEPERIDINE HYDROCHLORIDE 25 MG/ML
25 INJECTION INTRAMUSCULAR; INTRAVENOUS; SUBCUTANEOUS EVERY 30 MIN PRN
Status: CANCELLED | OUTPATIENT
Start: 2023-05-02

## 2023-05-02 RX ORDER — DIPHENHYDRAMINE HYDROCHLORIDE 50 MG/ML
50 INJECTION INTRAMUSCULAR; INTRAVENOUS
Status: DISCONTINUED | OUTPATIENT
Start: 2023-05-02 | End: 2023-05-02

## 2023-05-02 RX ORDER — METHYLPREDNISOLONE SODIUM SUCCINATE 125 MG/2ML
125 INJECTION, POWDER, LYOPHILIZED, FOR SOLUTION INTRAMUSCULAR; INTRAVENOUS
Status: CANCELLED
Start: 2023-05-02

## 2023-05-02 RX ORDER — ALBUTEROL SULFATE 90 UG/1
1-2 AEROSOL, METERED RESPIRATORY (INHALATION)
Status: CANCELLED
Start: 2023-05-02

## 2023-05-02 RX ADMIN — ACETAMINOPHEN 650 MG: 325 TABLET ORAL at 13:04

## 2023-05-02 RX ADMIN — SODIUM CHLORIDE 250 ML: 9 INJECTION, SOLUTION INTRAVENOUS at 13:15

## 2023-05-02 RX ADMIN — ZOLEDRONIC ACID 5 MG: 5 INJECTION, SOLUTION INTRAVENOUS at 13:18

## 2023-05-02 ASSESSMENT — PAIN SCALES - GENERAL: PAINLEVEL: NO PAIN (0)

## 2023-05-02 NOTE — PROGRESS NOTES
Infusion Nursing Note:  Kimberly Hernandez presents today for Reclast.    Patient seen by provider today: No   present during visit today: Not Applicable.    Note: Patient arrives today via ambulatory for her Reclast infusion.  Patient has reviewed this in the past and her labs were completed on 4-11-23.  Patient is alert and VSS.  .      Intravenous Access:  Peripheral IV placed.    Treatment Conditions:  Lab Results   Component Value Date     04/11/2023    POTASSIUM 5.0 04/11/2023    CR 0.86 04/11/2023    EULALIA 10.3 (H) 04/11/2023    BILITOTAL 0.2 04/11/2023    ALBUMIN 4.4 04/11/2023    ALT 18 04/11/2023    AST 26 04/11/2023     Results reviewed, labs MET treatment parameters, ok to proceed with treatment.        Post Infusion Assessment:  Patient tolerated infusion without incident.  Site patent and intact, free from redness, edema or discomfort.  No evidence of extravasations.  Access discontinued per protocol.       Discharge Plan:   Discharge instructions reviewed with: Patient.  Patient and/or family verbalized understanding of discharge instructions and all questions answered.  Copy of AVS reviewed with patient and/or family.  Patient will return in one year for next appointment.      TITA CHURCHILL RN

## 2023-05-03 ENCOUNTER — HOSPITAL ENCOUNTER (OUTPATIENT)
Dept: ULTRASOUND IMAGING | Facility: HOSPITAL | Age: 78
Discharge: HOME | End: 2023-05-03
Attending: NURSE PRACTITIONER | Admitting: NURSE PRACTITIONER
Payer: COMMERCIAL

## 2023-05-03 DIAGNOSIS — I73.9 CLAUDICATION OF BOTH LOWER EXTREMITIES (H): ICD-10-CM

## 2023-05-03 PROCEDURE — 93923 UPR/LXTR ART STDY 3+ LVLS: CPT

## 2023-05-04 ENCOUNTER — OFFICE VISIT (OUTPATIENT)
Dept: INTERNAL MEDICINE | Facility: CLINIC | Age: 78
End: 2023-05-04
Payer: COMMERCIAL

## 2023-05-04 VITALS
SYSTOLIC BLOOD PRESSURE: 138 MMHG | HEIGHT: 64 IN | BODY MASS INDEX: 21.05 KG/M2 | OXYGEN SATURATION: 100 % | RESPIRATION RATE: 16 BRPM | WEIGHT: 123.3 LBS | DIASTOLIC BLOOD PRESSURE: 64 MMHG | HEART RATE: 82 BPM | TEMPERATURE: 97.9 F

## 2023-05-04 DIAGNOSIS — J32.9 RHINOSINUSITIS: Primary | ICD-10-CM

## 2023-05-04 DIAGNOSIS — R05.1 ACUTE COUGH: ICD-10-CM

## 2023-05-04 PROCEDURE — 99213 OFFICE O/P EST LOW 20 MIN: CPT | Performed by: NURSE PRACTITIONER

## 2023-05-04 RX ORDER — CODEINE PHOSPHATE AND GUAIFENESIN 10; 100 MG/5ML; MG/5ML
1-2 SOLUTION ORAL EVERY 4 HOURS PRN
Qty: 236 ML | Refills: 0 | Status: SHIPPED | OUTPATIENT
Start: 2023-05-04 | End: 2023-10-17

## 2023-05-04 ASSESSMENT — PAIN SCALES - GENERAL: PAINLEVEL: NO PAIN (0)

## 2023-05-04 NOTE — PROGRESS NOTES
"  Assessment & Plan   Problem List Items Addressed This Visit    None  Visit Diagnoses     Rhinosinusitis    -  Primary    Relevant Medications    guaiFENesin-codeine (ROBITUSSIN AC) 100-10 MG/5ML solution    Acute cough        Relevant Medications    guaiFENesin-codeine (ROBITUSSIN AC) 100-10 MG/5ML solution         She will start Augmentin 1 tablet twice daily x7 days for rhinosinusitis.  Robitussin-AC was prescribed for cough as needed.  Advised not to take the medication with alcohol and avoid driving after taking the medication.  Increase fluid intake, saline rinses.  Follow-up symptoms worsen or fail to improve within the next 7 to 10 days.        Luz Cedillo NP  [facility] EVAN Harris is a 77 year old, presenting for the following health issues:  Cough (Congestion)        5/4/2023    10:36 AM   Additional Questions   Roomed by ION Bynum   Accompanied by JESSICA GUDINO     Acute Illness  Acute illness concerns: Cough  Onset/Duration: 2 and a half weeks.   Symptoms:  Fever: No  Chills/Sweats: No  Headache (location?): No  Sinus Pressure: YES  Conjunctivitis:  No  Ear Pain: no  Rhinorrhea: YES  Congestion: YES  Sore Throat: No  Cough: YES-productive of clear sputum  Wheeze: YES  Decreased Appetite: No  Nausea: No  Vomiting: No  Diarrhea: No  Dysuria/Freq.: No  Dysuria or Hematuria: No  Fatigue/Achiness: YES- fatigue  Sick/Strep Exposure: No  Therapies tried and outcome: Tylenol, cough syrup-didn't do much.     She is having a difficult time sleeping because of the cough. She has tried OTC products for the cough as well as benzonatate without improvement. She only sleeps about 3 hours because the cough wakes her up.           Objective    /64 (BP Location: Right arm, Patient Position: Sitting, Cuff Size: Adult Regular)   Pulse 82   Temp 97.9  F (36.6  C) (Oral)   Resp 16   Ht 1.626 m (5' 4\")   Wt 55.9 kg (123 lb 4.8 oz)   LMP  (LMP Unknown)   SpO2 100%   " Breastfeeding No   BMI 21.16 kg/m    Body mass index is 21.16 kg/m .     Physical Exam   GENERAL: alert and no distress. Appears ill but not toxic   EYES: Eyes grossly normal to inspection, conjunctivae and sclerae normal  HENT: ear canals and TM's normal, mouth without ulcers or lesions  NECK: no adenopathy, no asymmetry, masses, or scars and thyroid normal to palpation  RESP: lungs clear to auscultation - no rales, rhonchi or wheezes  CV: regular rate and rhythm, normal S1 S2, no S3 or S4, no murmur

## 2023-05-08 ENCOUNTER — TELEPHONE (OUTPATIENT)
Dept: INTERNAL MEDICINE | Facility: CLINIC | Age: 78
End: 2023-05-08
Payer: COMMERCIAL

## 2023-05-08 DIAGNOSIS — E78.2 MIXED HYPERLIPIDEMIA: ICD-10-CM

## 2023-05-08 DIAGNOSIS — I73.9 PAD (PERIPHERAL ARTERY DISEASE) (H): ICD-10-CM

## 2023-05-08 DIAGNOSIS — I10 ESSENTIAL HYPERTENSION: Primary | ICD-10-CM

## 2023-05-08 RX ORDER — AMLODIPINE BESYLATE 10 MG/1
10 TABLET ORAL DAILY
Qty: 90 TABLET | Refills: 0 | Status: SHIPPED | OUTPATIENT
Start: 2023-05-08 | End: 2023-10-17

## 2023-05-08 RX ORDER — ATORVASTATIN CALCIUM 40 MG/1
40 TABLET, FILM COATED ORAL DAILY
Qty: 90 TABLET | Refills: 0 | Status: SHIPPED | OUTPATIENT
Start: 2023-05-08 | End: 2023-06-19

## 2023-05-08 NOTE — TELEPHONE ENCOUNTER
Please initiate prior auth for codeine-guaifenesin       Thank you,  Jackson Sen Jr., CMA on 5/8/2023 at 1:16 PM

## 2023-05-09 DIAGNOSIS — I73.9 PAD (PERIPHERAL ARTERY DISEASE) (H): Primary | ICD-10-CM

## 2023-05-12 NOTE — TELEPHONE ENCOUNTER
PRIOR AUTHORIZATION DENIED    Medication: Codeine-Guaifensin     Denial Date: 5/12/2023    Denial Rational: Medication is excluded

## 2023-05-12 NOTE — TELEPHONE ENCOUNTER
Central Prior Authorization Team   Phone: [phone]    PA Initiation    Medication: Codeine-Guaifensin   Insurance Company: [employer] - Phone [phone] Fax [phone]  Pharmacy Filling the Rx: [id number] [place], [place] - [facility] AT [facility] [address]  Filling Pharmacy Phone: [phone]  Filling Pharmacy Fax:    Start Date: 5/12/2023

## 2023-06-07 ENCOUNTER — TELEPHONE (OUTPATIENT)
Dept: INTERNAL MEDICINE | Facility: CLINIC | Age: 78
End: 2023-06-07

## 2023-06-07 NOTE — TELEPHONE ENCOUNTER
Patient calling to get a call back from Luz and her team     Patient didn't state specifically what it was about     Call Back  [phone] (Mobile)

## 2023-06-07 NOTE — TELEPHONE ENCOUNTER
Called to gain more information regarding call below. Pt wanted to update PCP that her leg is not getting any better- still having pain. Pt states she was never notified about her US from 5/3- per note, it appears pt was informed. Writer reviewed PCP's note with pt from that US. Pt states [facility] has not reached out to pt to schedule an appt. Writer informed pt that it looks like she already has an appt scheduled with Vascular on 6/19. Writer provided pt with appt time and info.     Pt thankful for call and the information as she was unsure. No other questions.

## 2023-06-13 DIAGNOSIS — G40.909 NONINTRACTABLE EPILEPSY WITHOUT STATUS EPILEPTICUS, UNSPECIFIED EPILEPSY TYPE (H): ICD-10-CM

## 2023-06-14 RX ORDER — PHENYTOIN SODIUM 100 MG/1
CAPSULE, EXTENDED RELEASE ORAL
Qty: 270 CAPSULE | Refills: 1 | Status: SHIPPED | OUTPATIENT
Start: 2023-06-14 | End: 2023-12-06

## 2023-06-14 NOTE — TELEPHONE ENCOUNTER
"Routing refill request to provider for review/approval because:  Needs provider review to make sure it is at therapeutic level    Last Written Prescription Date:  4/11/23  Last Fill Quantity: 270,  # refills: 1   Last office visit provider:   5/4/23    Requested Prescriptions   Pending Prescriptions Disp Refills     phenytoin (DILANTIN) 100 MG ER capsule 270 capsule 1     Sig: Take 2 capsules (200 mg) by mouth every morning AND 1 capsule (100 mg) every evening.       Anti-Seizure Meds Protocol  Failed - 6/14/2023  9:34 AM        Failed - Review Authorizing provider's last note.      Refer to last progress notes: confirm request is for original authorizing provider (cannot be through other providers).          Failed - Dilantin level within therapeutic range in past 26 months     Recent Labs   Lab Test 04/11/23  1610   DILANTIN 13.1       Dilantin level must be checked 2-4 weeks after dosage change.          Passed - Recent (12 mo) or future (30 days) visit within the authorizing provider's specialty     Patient has had an office visit with the authorizing provider or a provider within the authorizing providers department within the previous 12 mos or has a future within next 30 days. See \"Patient Info\" tab in inbasket, or \"Choose Columns\" in Meds & Orders section of the refill encounter.              Passed - Normal CBC on file in past 26 months     Recent Labs   Lab Test 04/11/23  1610   WBC 8.1   RBC 4.56   HGB 15.1   HCT 45.9                    Passed - Normal ALT or AST on file in past 26 months     Recent Labs   Lab Test 04/11/23  1610   ALT 18     Recent Labs   Lab Test 04/11/23  1610   AST 26             Passed - Normal platelet count on file in past 26 months     Recent Labs   Lab Test 04/11/23  1610                  Passed - Medication is active on med list        Passed - No active pregnancy on record        Passed - No positive pregnancy test in last 12 months             KAM LAWRENCE RN " 06/14/23 9:34 AM

## 2023-06-16 ENCOUNTER — ANCILLARY PROCEDURE (OUTPATIENT)
Dept: VASCULAR ULTRASOUND | Facility: CLINIC | Age: 78
End: 2023-06-16
Attending: SURGERY
Payer: COMMERCIAL

## 2023-06-16 ENCOUNTER — HOSPITAL ENCOUNTER (OUTPATIENT)
Dept: CT IMAGING | Facility: HOSPITAL | Age: 78
Discharge: HOME | End: 2023-06-16
Attending: SURGERY
Payer: COMMERCIAL

## 2023-06-16 DIAGNOSIS — I73.9 PAD (PERIPHERAL ARTERY DISEASE) (H): ICD-10-CM

## 2023-06-16 LAB
CREAT BLD-MCNC: 1 MG/DL (ref 0.6–1.1)
GFR SERPL CREATININE-BSD FRML MDRD: 58 ML/MIN/1.73M2

## 2023-06-16 PROCEDURE — 75635 CT ANGIO ABDOMINAL ARTERIES: CPT

## 2023-06-16 PROCEDURE — 82565 ASSAY OF CREATININE: CPT

## 2023-06-16 PROCEDURE — 93925 LOWER EXTREMITY STUDY: CPT

## 2023-06-16 PROCEDURE — 250N000011 HC RX IP 250 OP 636: Performed by: SURGERY

## 2023-06-16 RX ORDER — IOPAMIDOL 755 MG/ML
67 INJECTION, SOLUTION INTRAVASCULAR ONCE
Status: COMPLETED | OUTPATIENT
Start: 2023-06-16 | End: 2023-06-16

## 2023-06-16 RX ADMIN — IOPAMIDOL 67 ML: 755 INJECTION, SOLUTION INTRAVENOUS at 12:13

## 2023-06-19 ENCOUNTER — OFFICE VISIT (OUTPATIENT)
Dept: VASCULAR SURGERY | Facility: CLINIC | Age: 78
End: 2023-06-19
Attending: NURSE PRACTITIONER
Payer: COMMERCIAL

## 2023-06-19 VITALS
BODY MASS INDEX: 21.85 KG/M2 | DIASTOLIC BLOOD PRESSURE: 60 MMHG | RESPIRATION RATE: 16 BRPM | TEMPERATURE: 98.1 F | WEIGHT: 127.3 LBS | HEART RATE: 58 BPM | SYSTOLIC BLOOD PRESSURE: 130 MMHG

## 2023-06-19 DIAGNOSIS — I70.211 ATHEROSCLEROSIS OF NATIVE ARTERIES OF EXTREMITIES WITH INTERMITTENT CLAUDICATION, RIGHT LEG (H): ICD-10-CM

## 2023-06-19 DIAGNOSIS — E78.2 MIXED HYPERLIPIDEMIA: ICD-10-CM

## 2023-06-19 DIAGNOSIS — I73.9 PAD (PERIPHERAL ARTERY DISEASE) (H): ICD-10-CM

## 2023-06-19 PROCEDURE — 99204 OFFICE O/P NEW MOD 45 MIN: CPT | Performed by: SURGERY

## 2023-06-19 PROCEDURE — G0463 HOSPITAL OUTPT CLINIC VISIT: HCPCS | Performed by: SURGERY

## 2023-06-19 RX ORDER — ATORVASTATIN CALCIUM 40 MG/1
40 TABLET, FILM COATED ORAL DAILY
Qty: 90 TABLET | Refills: 0 | Status: SHIPPED | OUTPATIENT
Start: 2023-06-19 | End: 2023-10-17

## 2023-06-19 NOTE — PATIENT INSTRUCTIONS
Kimberly CROOKS David,    Your visit to [facility] for your procedure is coming soon and we look forward to seeing you! This friendly reminder and pre-procedure checklist will help to ensure your procedure goes smoothly and meets your expectations. At [facility], our goal is to provide you with a great patient experience and to deliver genuine, professional care to every patient.     Please complete all the steps in advance of your visit. If you have any questions about the items listed below, please give our office a call. We can be reached at [phone] or visit our website at [email or web address] for more information.     Procedure: Right  Leg  Angiogram     Procedure Date :  TBD    Procedure Time :  TBD    Arrival Time: TBD    2 week Post Procedure Appointment with   and also ultrasound: TBD      Admission Type: Outpatient    Surgeon:     Procedure Location: [facility]:  [address] [place] [address] (Phone: [phone], Fax: [phone])      If you take blood thinners: SEE SPECIFIC INSTRUCTIONS BELOW    PLEASE DO NOT STOP YOUR ASPIRIN OR PLAVIX UNLESS SPECIFICALLY DIRECTED BY THE VASCULAR SURGEON TO STOP!  In most cases Vascular providers want you to continue these. This is different from most NON vascular surgeries and may not be well known by your Primary Care Provider Aspirin: PLEASE DO NOT STOP THIS MEDICATION PRIOR TO SURGERY/ PROCEDURE.       Prepare for the peripheral angiography as follows:  Do not eat 8 hours before your procedure. You may have clear liquids up to 1 hour before surgery.  Tell your healthcare provider about all medicines you take and any allergies you may have.  Arrange for a family member or friend to drive you home.  If you are on Metformin, please HOLD for 48 hours after the procedure.  Please be sure to address your diabetic medications with your Primary  Care Physician prior to your angiogram.    Peripheral Angiography    Peripheral angiography is an outpatient procedure that makes a  map  of the vessels (arteries) in your lower body, legs, and arms, using X-ray and dye.This map can show where blood flow may be blocked.    An angiogram is commonly performed under sedation with the use of local anesthesia.    The procedure usually starts with a needle put into the femoral (groin) artery. From one treatment site, areas all over the body can be treated.  After access is established, catheters (thin tubes) and wires are threaded through the arterial system to a specific area of interest or throughout the entire body.  As a contrast agent (iodine dye) is injected, X-ray images are taken to let your provider view the flow of the dye and identify blockages. The surgeon can then choose the best mode of therapy for you - whether during or following the angiogram. This decision depends on your symptoms and the severity and characteristics of the blockages.  Two common therapies that can be provided during the angiogram are balloon angioplasty and stent placement.                   Angioplasty can be used to open arterial blockages. Guided by X-ray, your provider navigates through the blockage with a wire and introduces a special device equipped with an inflatable balloon. After positioning the balloon device across the blocked portion of the artery, the provider inflates the balloon to expand the artery and compress the blockage. The balloon is then deflated and removed while keeping the wire in place across the area that has been treated. Next, contrast dye is injected to assess the result. Treatment is considered a success if blood flow is improved and less than 30% of the blockage remains. If the vessel is still considerably narrowed, placing a stent may be the next step.    Stents are used to prop open an artery at the site of a narrowing. Stents are generally placed  after balloon angioplasty when there is residual narrowing or insufficient blood flow in a treated vessel. Stents are considered a permanent implant and cannot be used if you have a metal allergy. Stents that are used in the leg are constructed of a nickel-titanium alloy (Nitinol), a memory-shaped metal. This alloy has a predetermined size and shape at body temperature and expands to this size and shape after being introduced through a catheter. These stents resist kinking and are flexible so that damage from activities that involve your legs is minimized.  Your procedure may require other techniques to address the problem or plaque.     If surgery is felt to be a better option, your vascular provider will obtain any additional X-ray images needed to plan a surgical bypass of the blocked vessel/s and will then conclude the angiogram.      During the procedure  Here is what to expect:  You may get medicine through an IV (intravenous) line to relax you. You re given an injection to numb the insertion site. Then, a tiny skin cut (incision) is made near an artery in your groin.  Your provider inserts a thin tube (catheter) through the incision. He or she then threads the catheter into an artery while looking at a video monitor.  Contrast  dye  is injected into the catheter to confirm position. You may feel warmth or pressure in your legs and back. You lie still as X-rays are taken. The catheter is then taken out.  After the procedure  You ll be taken to a recovery area. A healthcare provider will apply pressure to the site for about 10 minutes. Your healthcare provider will tell you how long to lie down and keep the insertion site still. Your healthcare provider will discuss the results with you soon after the procedure.      Angiogram Procedure Discharge Instructions:     1. If you received sedation for your procedure: Do not drive or operate heavy machinery for the rest of the day.  2. Avoid strenuous activity for 72  hours (3 days):                        - Do not lift greater than 10 pounds.                        - Excessive exercise                        - Straining                        - Return to your normal activities as you tolerate after the 3 days restriction  3. Avoid tub baths, Jacuzzis, hot tubs and pools for 72 hours (3 days) or until puncture site is will healed.  4. You may shower beginning tomorrow. Do not scrub puncture site(s) until well healed, pat dry.  5. You can expect to return to work 1-2 days after your procedure - depending on the nature of your profession.  6. It is normal to have some tenderness and minimal swelling at puncture site. A small area of discoloration may be present. Tenderness typically subsides in 24-48 hours. A small knot may also be present at puncture site for 6-8 weeks, this can be a normal part of the healing process.       After the angiogram If you:      1. Experience any bleeding or active swelling from puncture site: Lie down, firmly apply pressure to puncture site and CALL 9-1-1  2. Fever greater than 101 degrees Fahrenheit.  3. Redness, swelling, warmth to touch, or purulent (yellow/green/foul smelling) drainage from the puncture site.  4. Increasing pain, tenderness or swelling at puncture site OR of arm/leg near puncture site.  5. Feeling weak or faint.  6. Change in color, temperature, or sensation of arm/leg where puncture was made.  7. You can t feel your thrill (pulse at your dialysis fistula site) or it feels weak (If you had fistulogram done).     Call us with any other questions or concerns after your procedure: [phone]      All invasive procedures can have complications. While the risk of an angiogram is low it is not zero. The most common complications are related to the arterial access site.       Risks/ Complications   Bruising is Common  You will likely have bruising (ecchymosis) where the artery was entered.    Pain and Bleeding  Less commonly, patients  "experience pain and bleeding that may include blood collecting under the skin (hematoma).    Blockage and Leakage   In rare cases, the access artery can become blocked. Infrequently, patients experience persistent leakage of blood where the artery was entered, which can result in the formation of a pseudoaneurysm--a blood-filled sac--that may require further treatment.  Other complications related to an angiogram include:   Allergic reaction to the iodine contrast dye, which can lead to the development of kidney failure.  Very rarely during balloon angioplasty and/or stent placement, part of the arterial blockage can break off (embolism) and travel to more distant arteries. This can worsen blood flow.    Pre-Procedure checklist:    [] A Pre-op physical within 30 days of the procedure is required. You will need to set up an appointment with your primary care provider.  [] Contact your insurance regarding coverage  If you would like a Good [place] Estimate for your upcoming procedure at [facility], contact Cost of Care Estimates   Advocates are available Monday through Friday 8am - 5pm     You may also submit a request online at [email or web address]  - Complete the secure online form found under \"Services and Procedure Pricing\"   If your procedure is at [facility], please contact the numbers below for Cost of Care Estimates.   - Facility Charge: [phone]    Anesthesiology charge:  [phone]   [] DO NOT BRING FMLA WITH TO SURGERY.  These should be sent to the provider's office by fax to .     [] Day of Surgery  Medications - Take as indicated with sips of water.   Wear comfortable loose-fitting clothes. Wear your glasses-Not contacts. Do not wear jewelry and remove body piercings. Surgery may be cancelled if they are not removed.   You may have 1 family member wait in the lobby during your surgery. Visitor restrictions are subject to change. Please verify with the " surgery nurse when they call.   If same day surgery-Have a someone come with you to surgery that can help you understand the surgeon's instructions, drive you home and stay with you overnight the first night.    [] You will receive a call from a nurse 1-3 days prior to the procedure. They will go over more details with you    Notify our office right away, if you have any changes in your health status, or if you develop a cold, flu, diarrhea, infection, fever or sore throat before your scheduled surgery date. We can be reached at  [phone] Monday-Friday 8 am-4:30 pm if you have any questions.   Thank you for choosing [facility]

## 2023-06-19 NOTE — PROGRESS NOTES
[facility]        Patient is here for a consult to discuss PAD. Patient has no symptoms.    Pt is currently taking Aspirin. Not taking statin.    /60   Temp 98.1  F (36.7  C)   Resp 16   Wt 127 lb 4.8 oz (57.7 kg)   LMP  (LMP Unknown)   BMI 21.85 kg/m      The provider has been notified that the patient has no concerns.     Questions patient would like addressed today are: N/A.    Refills are needed: No    Has homecare services and agency name:  Dorothy TRAMMELL LPN
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
## 2023-06-19 NOTE — Clinical Note
Needs RLE angio scheduled, July ok. Patient asked to be called tomorrow to schedule.Reviewed other instructions in clinic today

## 2023-07-18 ENCOUNTER — TELEPHONE (OUTPATIENT)
Dept: VASCULAR SURGERY | Facility: CLINIC | Age: 78
End: 2023-07-18
Payer: COMMERCIAL

## 2023-07-18 NOTE — TELEPHONE ENCOUNTER
Procedure: RLE angiogram    Date: 7/19/23    Surgeon: MD Lanette Butt    Called patient to review upcoming procedure. Reviewed visitor allowance of 1 person at this time.     Discussed procedure with patients and instructions: Yes    Reviewed Post Procedure Follow up plan and Appts made: Yes    Reviewed Covid Test Scheduled/Completed: NA    Reviewed Blood Thinners and plan for holding, continuing and/or bridging:Not Applicable   Aspirin ok    Reviewed Pre Op Appointment Required and Completed: N/A    Reviewed Allergies and if Contrast Allergy-Instructions and plan: no contrast allergy    Reviewed Arrival Time of 7am and procedure time of 8am and location of procedure [facility]:  [address] [place] [address] (phone: [phone], Fax: [phone])        All questions answered and number provided if any further questions arise or changes in patient status.

## 2023-07-18 NOTE — TELEPHONE ENCOUNTER
Spoke with patient, she was unaware her angiogram was scheduled for tomorrow and is currently in [place]. Angiogram will be rescheduled.

## 2023-07-21 ENCOUNTER — TELEPHONE (OUTPATIENT)
Dept: VASCULAR SURGERY | Facility: CLINIC | Age: 78
End: 2023-07-21
Payer: COMMERCIAL

## 2023-07-21 NOTE — TELEPHONE ENCOUNTER
LMTCB to review angiogram schedule.    Angiogram rescheduled:    Date: 8/1/23    Time: 8:00 AM (7:00 AM arrival)    Location: [facility]    Surgeon: Dr. Butt    Follow ups:   8/18/2023 9:00 AM (Arrive by 8:45 AM) [facility] [address] [facility]   8/18/2023 10:00 AM (Arrive by 9:45 AM) [facility] [address] [facility]   8/21/2023 10:40 AM (Arrive by 10:25 AM) Lanette Butt MD [facility]

## 2023-07-28 RX ORDER — HEPARIN SODIUM 200 [USP'U]/100ML
1 INJECTION, SOLUTION INTRAVENOUS CONTINUOUS PRN
Status: DISCONTINUED | OUTPATIENT
Start: 2023-08-01 | End: 2023-08-02 | Stop reason: HOSPADM

## 2023-07-28 RX ORDER — NALOXONE HYDROCHLORIDE 0.4 MG/ML
0.2 INJECTION, SOLUTION INTRAMUSCULAR; INTRAVENOUS; SUBCUTANEOUS
Status: DISCONTINUED | OUTPATIENT
Start: 2023-08-01 | End: 2023-08-02 | Stop reason: HOSPADM

## 2023-07-28 RX ORDER — FLUMAZENIL 0.1 MG/ML
0.2 INJECTION, SOLUTION INTRAVENOUS
Status: DISCONTINUED | OUTPATIENT
Start: 2023-08-01 | End: 2023-08-02 | Stop reason: HOSPADM

## 2023-07-28 RX ORDER — NALOXONE HYDROCHLORIDE 0.4 MG/ML
0.4 INJECTION, SOLUTION INTRAMUSCULAR; INTRAVENOUS; SUBCUTANEOUS
Status: DISCONTINUED | OUTPATIENT
Start: 2023-08-01 | End: 2023-08-02 | Stop reason: HOSPADM

## 2023-07-28 RX ORDER — FENTANYL CITRATE 50 UG/ML
25-50 INJECTION, SOLUTION INTRAMUSCULAR; INTRAVENOUS EVERY 5 MIN PRN
Status: DISCONTINUED | OUTPATIENT
Start: 2023-08-01 | End: 2023-08-02 | Stop reason: HOSPADM

## 2023-07-28 RX ORDER — ONDANSETRON 2 MG/ML
4 INJECTION INTRAMUSCULAR; INTRAVENOUS
Status: DISCONTINUED | OUTPATIENT
Start: 2023-08-01 | End: 2023-08-02 | Stop reason: HOSPADM

## 2023-07-28 RX ORDER — NICOTINE POLACRILEX 4 MG
15-30 LOZENGE BUCCAL
Status: DISCONTINUED | OUTPATIENT
Start: 2023-08-01 | End: 2023-08-02 | Stop reason: HOSPADM

## 2023-07-28 RX ORDER — DEXTROSE MONOHYDRATE 25 G/50ML
25-50 INJECTION, SOLUTION INTRAVENOUS
Status: DISCONTINUED | OUTPATIENT
Start: 2023-08-01 | End: 2023-08-02 | Stop reason: HOSPADM

## 2023-07-31 NOTE — TELEPHONE ENCOUNTER
Called patient and left vm to call back.     Need to review over prep instructions for angiogram tomorrow with Dr. Butt.     Procedure: Right leg angiogram    Date: 8/1/23    Time: 8:00 AM (7:00 AM arrival)    Location: [facility]     Surgeon: Dr. Butt    NPO after midnight tonight. Ok to take sip of water with medications. Ok to take aspirin. Need a  to take her home. Expect to be at hospital for 6-8 hours.         Called again at 1244 and reviewed instructions with her.

## 2023-08-01 ENCOUNTER — HOSPITAL ENCOUNTER (OUTPATIENT)
Dept: INTERVENTIONAL RADIOLOGY/VASCULAR | Facility: HOSPITAL | Age: 78
Discharge: HOME | End: 2023-08-01
Attending: SURGERY | Admitting: SURGERY
Payer: COMMERCIAL

## 2023-08-01 VITALS
RESPIRATION RATE: 18 BRPM | DIASTOLIC BLOOD PRESSURE: 65 MMHG | HEART RATE: 63 BPM | SYSTOLIC BLOOD PRESSURE: 143 MMHG | OXYGEN SATURATION: 98 % | TEMPERATURE: 98.2 F

## 2023-08-01 DIAGNOSIS — I70.211 ATHEROSCLEROSIS OF NATIVE ARTERIES OF EXTREMITIES WITH INTERMITTENT CLAUDICATION, RIGHT LEG (H): ICD-10-CM

## 2023-08-01 DIAGNOSIS — I73.9 PAD (PERIPHERAL ARTERY DISEASE) (H): ICD-10-CM

## 2023-08-01 LAB
CREAT SERPL-MCNC: 0.81 MG/DL (ref 0.51–0.95)
GFR SERPL CREATININE-BSD FRML MDRD: 74 ML/MIN/1.73M2
HGB BLD-MCNC: 14.9 G/DL (ref 11.7–15.7)
INR PPP: 0.99 (ref 0.85–1.15)
PLATELET # BLD AUTO: 232 10E3/UL (ref 150–450)

## 2023-08-01 PROCEDURE — C1769 GUIDE WIRE: HCPCS

## 2023-08-01 PROCEDURE — C1887 CATHETER, GUIDING: HCPCS

## 2023-08-01 PROCEDURE — 37226 HC REVASCULARIZE FEM-POP ARTERY ANGIOPLASTY-STENT: CPT

## 2023-08-01 PROCEDURE — C1760 CLOSURE DEV, VASC: HCPCS

## 2023-08-01 PROCEDURE — 272N000500 HC NEEDLE CR2

## 2023-08-01 PROCEDURE — 76937 US GUIDE VASCULAR ACCESS: CPT

## 2023-08-01 PROCEDURE — 255N000002 HC RX 255 OP 636: Performed by: SURGERY

## 2023-08-01 PROCEDURE — 85610 PROTHROMBIN TIME: CPT | Performed by: SURGERY

## 2023-08-01 PROCEDURE — 36415 COLL VENOUS BLD VENIPUNCTURE: CPT | Performed by: SURGERY

## 2023-08-01 PROCEDURE — 250N000009 HC RX 250: Performed by: STUDENT IN AN ORGANIZED HEALTH CARE EDUCATION/TRAINING PROGRAM

## 2023-08-01 PROCEDURE — 250N000013 HC RX MED GY IP 250 OP 250 PS 637: Performed by: SURGERY

## 2023-08-01 PROCEDURE — 37226 PR REVASCULARIZE FEM/POP ARTERY, ANGIOPLASTY/STENT: CPT | Mod: RT | Performed by: SURGERY

## 2023-08-01 PROCEDURE — 99152 MOD SED SAME PHYS/QHP 5/>YRS: CPT | Performed by: SURGERY

## 2023-08-01 PROCEDURE — 82565 ASSAY OF CREATININE: CPT | Performed by: SURGERY

## 2023-08-01 PROCEDURE — 85049 AUTOMATED PLATELET COUNT: CPT | Performed by: SURGERY

## 2023-08-01 PROCEDURE — 75710 ARTERY X-RAYS ARM/LEG: CPT | Mod: 26 | Performed by: SURGERY

## 2023-08-01 PROCEDURE — 272N000302 HC DEVICE INFLATION CR5

## 2023-08-01 PROCEDURE — 76937 US GUIDE VASCULAR ACCESS: CPT | Mod: 26 | Performed by: SURGERY

## 2023-08-01 PROCEDURE — 272N000570 HC SHEATH CR7

## 2023-08-01 PROCEDURE — 272N000566 HC SHEATH CR3

## 2023-08-01 PROCEDURE — 250N000011 HC RX IP 250 OP 636: Performed by: STUDENT IN AN ORGANIZED HEALTH CARE EDUCATION/TRAINING PROGRAM

## 2023-08-01 PROCEDURE — 75710 ARTERY X-RAYS ARM/LEG: CPT | Mod: XU,RT

## 2023-08-01 PROCEDURE — C1876 STENT, NON-COA/NON-COV W/DEL: HCPCS

## 2023-08-01 PROCEDURE — 99153 MOD SED SAME PHYS/QHP EA: CPT

## 2023-08-01 PROCEDURE — 85018 HEMOGLOBIN: CPT | Performed by: SURGERY

## 2023-08-01 PROCEDURE — C1725 CATH, TRANSLUMIN NON-LASER: HCPCS

## 2023-08-01 PROCEDURE — 99152 MOD SED SAME PHYS/QHP 5/>YRS: CPT

## 2023-08-01 PROCEDURE — 250N000011 HC RX IP 250 OP 636: Performed by: SURGERY

## 2023-08-01 PROCEDURE — C2623 CATH, TRANSLUMIN, DRUG-COAT: HCPCS

## 2023-08-01 RX ORDER — SODIUM CHLORIDE 9 MG/ML
INJECTION, SOLUTION INTRAVENOUS CONTINUOUS
Status: DISCONTINUED | OUTPATIENT
Start: 2023-08-01 | End: 2023-08-02 | Stop reason: HOSPADM

## 2023-08-01 RX ORDER — CLOPIDOGREL BISULFATE 75 MG/1
75 TABLET ORAL DAILY
Qty: 42 TABLET | Refills: 0 | Status: SHIPPED | OUTPATIENT
Start: 2023-08-01 | End: 2024-03-14

## 2023-08-01 RX ORDER — IODIXANOL 320 MG/ML
200 INJECTION, SOLUTION INTRAVASCULAR ONCE
Status: COMPLETED | OUTPATIENT
Start: 2023-08-01 | End: 2023-08-01

## 2023-08-01 RX ORDER — HEPARIN SODIUM 1000 [USP'U]/ML
2000 INJECTION, SOLUTION INTRAVENOUS; SUBCUTANEOUS ONCE
Status: COMPLETED | OUTPATIENT
Start: 2023-08-01 | End: 2023-08-01

## 2023-08-01 RX ORDER — ACETAMINOPHEN 325 MG/1
650 TABLET ORAL EVERY 6 HOURS PRN
Status: DISCONTINUED | OUTPATIENT
Start: 2023-08-01 | End: 2023-08-02 | Stop reason: HOSPADM

## 2023-08-01 RX ORDER — LIDOCAINE 40 MG/G
CREAM TOPICAL
Status: DISCONTINUED | OUTPATIENT
Start: 2023-08-01 | End: 2023-08-02 | Stop reason: HOSPADM

## 2023-08-01 RX ORDER — CLOPIDOGREL BISULFATE 75 MG/1
300 TABLET ORAL ONCE
Status: COMPLETED | OUTPATIENT
Start: 2023-08-01 | End: 2023-08-01

## 2023-08-01 RX ORDER — OXYCODONE HYDROCHLORIDE 5 MG/1
5 TABLET ORAL EVERY 4 HOURS PRN
Status: DISCONTINUED | OUTPATIENT
Start: 2023-08-01 | End: 2023-08-02 | Stop reason: HOSPADM

## 2023-08-01 RX ORDER — HEPARIN SODIUM 1000 [USP'U]/ML
6000 INJECTION, SOLUTION INTRAVENOUS; SUBCUTANEOUS ONCE
Status: COMPLETED | OUTPATIENT
Start: 2023-08-01 | End: 2023-08-01

## 2023-08-01 RX ADMIN — MIDAZOLAM HYDROCHLORIDE 0.5 MG: 1 INJECTION, SOLUTION INTRAMUSCULAR; INTRAVENOUS at 08:54

## 2023-08-01 RX ADMIN — MIDAZOLAM HYDROCHLORIDE 0.5 MG: 1 INJECTION, SOLUTION INTRAMUSCULAR; INTRAVENOUS at 08:37

## 2023-08-01 RX ADMIN — FENTANYL CITRATE 50 MCG: 50 INJECTION, SOLUTION INTRAMUSCULAR; INTRAVENOUS at 08:28

## 2023-08-01 RX ADMIN — FENTANYL CITRATE 25 MCG: 50 INJECTION, SOLUTION INTRAMUSCULAR; INTRAVENOUS at 09:02

## 2023-08-01 RX ADMIN — FENTANYL CITRATE 25 MCG: 50 INJECTION, SOLUTION INTRAMUSCULAR; INTRAVENOUS at 08:44

## 2023-08-01 RX ADMIN — HEPARIN SODIUM 6000 UNITS: 1000 INJECTION INTRAVENOUS; SUBCUTANEOUS at 09:01

## 2023-08-01 RX ADMIN — HEPARIN SODIUM 3 BAG: 200 INJECTION, SOLUTION INTRAVENOUS at 10:04

## 2023-08-01 RX ADMIN — MIDAZOLAM HYDROCHLORIDE 0.5 MG: 1 INJECTION, SOLUTION INTRAMUSCULAR; INTRAVENOUS at 09:08

## 2023-08-01 RX ADMIN — HEPARIN SODIUM 2000 UNITS: 1000 INJECTION INTRAVENOUS; SUBCUTANEOUS at 09:56

## 2023-08-01 RX ADMIN — MIDAZOLAM HYDROCHLORIDE 1 MG: 1 INJECTION, SOLUTION INTRAMUSCULAR; INTRAVENOUS at 08:25

## 2023-08-01 RX ADMIN — LIDOCAINE HYDROCHLORIDE 5 ML: 10 INJECTION, SOLUTION INFILTRATION; PERINEURAL at 10:03

## 2023-08-01 RX ADMIN — CLOPIDOGREL BISULFATE 300 MG: 75 TABLET ORAL at 12:37

## 2023-08-01 RX ADMIN — IODIXANOL 90 ML: 320 INJECTION, SOLUTION INTRAVASCULAR at 10:21

## 2023-08-01 NOTE — OP NOTE
VASCULAR SURGERY ANGIOGRAM REPORT      [facility]    DATE: August 1, 2023    PROCEDURES PERFORMED:     1.  Ultrasound-guided access of left common femoral artery  2.  Aortogram  3.  Selective cannulation of the right common iliac artery, external iliac artery, and common femoral artery with nonselective right lower extremity angiogram  4.  Moderate sedation  5.  Arteriotomy closure with Angio-Seal device  6.  Balloon angioplasty of the right SFA using 5 mm x 150 mm balloon  7.  Placement of a stent into the distal SFA, using 5 mm x 80 mm Misago stent  8.  Placement of 5 tack devices within mid SFA  9.  Subsequent balloon angioplasty of the right SFA using 5 mm x 80 mm balloon    PROVIDER:     Lanette Butt MD    INDICATION:     78-year-old female with lifestyle-limiting claudication.  Preoperatively patient was found to have an SFA occlusion with limited outflow.  Patient scheduled for right lower extremity angiogram with possible intervention    SEDATION:     The procedure was performed with administration of intravenous conscious sedation with appropriate preoperative, intraoperative, and postoperative evaluation.   82 minutes of supervised face to face intraservice time was provided by a radiology nurse under my direct supervision.     ANTIBIOTICS: None  FLUOROSCOPIC TIME: 19.3 minutes  RADIATION DOSE: 432 mGy  CONTRAST: 90 cc       PROCEDURE:     Ultrasound was used to evaluate the left common femoral artery. The selected vessel was patent. Ultrasound was then used for real-time ultrasound guided needle entry of the  left common femoral artery. Permanent images were recorded and saved to the patient's medical record.  Micropuncture sheath was inserted.  A glide advantage wire was advanced into the aorta.  5 Setswana sheath was placed.  An Omni Flush catheter was advanced over the wire and positioned just below bilateral renal arteries at the level of L2.  Aortogram was performed.  Then using up and  over maneuver I was able to advance the wire into the right femoral artery.  Over the wire the Omni Flush catheter was advanced and positioned in the  right common femoral artery.  right extremity angiogram was performed.  The sheath was exchanged to 6 Kyrgyz by 45 cm sheath which was advanced over the wire and positioned in the right common femoral artery.  Then the wire and the exchange catheter were advanced distally, and I was able to cross a total occlusion of the SFA.  The catheter was advanced into the popliteal artery.  Focal angiogram of the popliteal artery did show intra-arterial placement.  Then the wire was exchanged to a 018 glide advantage wire.  Over the wire we proceeded with a balloon angioplasty of the right SFA.  Repeat angiogram did show a flow-limiting dissection in the distal SFA and and mid SFA.  The distal dissection I felt, was not amenable for tack device placement, however, it would be treatable with a stent placement.  Over the wire, a 5 mm x 80 mm stent was advanced and deployed with a distal SFA.  Then we proceeded with drug-coated balloon angioplasty of the entire SFA.  Using the same balloon, I proceeded with a post stent balloon angioplasty of the SFA stent.  Then, we proceeded with a tack device placement with immediate SFA.  Total of 5 devices were placed.  Subsequent balloon angioplasty of the this segment was performed using 5 mm x 80 mm balloon.  Repeat angiogram did show excellent flow through the SFA without flow limitation.  There is a nonflow limiting dissection within the proximal SFA which I left alone.  At that point the procedure was concluded.  The arteriotomy closure was performed using an Angio-Seal device.  Patient tolerated procedure well and was transferred to recovery area in stable condition.    FINDINGS     Infrarenal aorta is patent without hemodynamically significant stenosis.  There is a 40% stenosis of the proximal right common iliac artery.  Right external  leg arteries patent without hemodynamically significant stenosis.  Right common femoral artery is patent without hemodynamically significant stenosis.  Right profunda femoris is patent without hemodynamically significant stenosis.  There is a total occlusion of SFA with reconstitution of the level of distal/proximal popliteal artery.  Right popliteal artery is patent without hemodynamically significant stenosis.  There is a significant disease involving multiple vessels.  There is a total occlusion of right posterior tibial artery.  Right TP trunk is severely diseased with several segments of 90% stenosis.  Right anterior tibial artery is the main vessel runoff.    IMPRESSION   Successful treatment of right SFA occlusion.  Patient will require 6 weeks of Plavix.  Continue aspirin and statin indefinitely.    Lanette Butt MD, [facility]  VASCULAR SURGERY

## 2023-08-01 NOTE — SEDATION DOCUMENTATION
Noted pt snoring at times and would snore self awake where pt would then move arms and needed to be reminded to not move and she was in a procedure right now.

## 2023-08-01 NOTE — DISCHARGE INSTRUCTIONS
Angiogram Discharge Instructions:    You had an angiogram procedure. An angiogram is a procedure thatuses x-rays to take pictures of your blood vessels. A long, flexible tube or catheter is inserted through the blood stream (through the procedure site) to help deliver contrast (dye) into the arteries so they can be visibleon the x-ray. Angiograms are used to evaluate and treat possible blockages or other disease in the arterial system. Please follow the below instructions after your angiogram, including monitoring of your procedure site.    Care instructions after angiogram procedure:    - If you received sedation for your procedure, do not drive or operate heavy machinery for the rest of the day.    - Do not lift objects greater than 10 poundsfor 2 days following angiogram procedure.    - Avoid excessive exercise and straining for 2 days.    - Avoid tub baths, pools, hot tubs and Jacuzzis for 3 days or until procedure site is well healed.    - Youmay shower beginning tomorrow. Do not scrub procedure site until well healed; pat dry.    - Return to your normal activities as you tolerate after the 2 day restriction.    - You can expect to return to work 1-2days after your procedure - depending on the nature of your profession.    - It is normal to have some tenderness and minimal swelling at procedure puncture site. A small area of discoloration may be present.Tenderness typically subsides in 1-2 days. A small knot may also be present at puncture site for 6-8 weeks. This can be a normal part of the healing process.    Medications and other post-procedure care:    -If you had a blockage opened please make sure to fill your prescription for any new medication, such as Plavix, that you may have been prescribed and take it everyday    - If you have kidney function issues, please makesure to hydrate yourself well for the next two days by drinking lots of fluids to help clear your body of the dye used. If you have heart  problems such as heart failure, this may have to be moderated.    - If you are onMetformin, please do not resume it for 48hrs.    Follow up:    - Follow up with your vascular surgery team at the [facility] vascular center A follow up appointment should already be arranged for you.If you are unsure of your appointment or do not have a follow up appointment in the next 2-3 weeks, please call our office at [phone]. You will need to have an ultrasound 2-3 weeks after your angiogram and should bescheduled at the time of your follow up appt.    Further follow up will be based on ultrasound results. Typical follow up is every 3 months for the first year, then every 6 months to one year thereafter.    seek medical evaluation for:    - If you develop fevers (greater than 101 F (38.3C)).    - If you develop increasing pain, redness, purulent drainage, tenderness, or swelling at procedure site.    If you experience any bleeding from procedure/puncture site: lie down, firmly apply pressure to puncture site and call 911.    - Seek emergent evaluation if you experience any new leg/arm pain, discoloration ornumbness.    Call the vascular center at [phone] with questions/concerns or if you have any of the above symptoms.

## 2023-08-01 NOTE — PROGRESS NOTES
Reviewed discharge instructions with pt and ride. Verbalized understanding. Groin site CDI after being up sitting in a chair. Pt assisted to front lobby via W/C.

## 2023-08-22 ENCOUNTER — ANCILLARY PROCEDURE (OUTPATIENT)
Dept: VASCULAR ULTRASOUND | Facility: CLINIC | Age: 78
End: 2023-08-22
Attending: SURGERY
Payer: COMMERCIAL

## 2023-08-22 DIAGNOSIS — I73.9 PAD (PERIPHERAL ARTERY DISEASE) (H): ICD-10-CM

## 2023-08-22 PROCEDURE — 93926 LOWER EXTREMITY STUDY: CPT | Mod: RT

## 2023-08-22 PROCEDURE — 93923 UPR/LXTR ART STDY 3+ LVLS: CPT

## 2023-08-26 DIAGNOSIS — I10 ESSENTIAL HYPERTENSION: ICD-10-CM

## 2023-08-26 RX ORDER — HYDROCHLOROTHIAZIDE 25 MG/1
25 TABLET ORAL DAILY
Qty: 90 TABLET | Refills: 3 | OUTPATIENT
Start: 2023-08-26

## 2023-08-28 ENCOUNTER — VIRTUAL VISIT (OUTPATIENT)
Dept: VASCULAR SURGERY | Facility: CLINIC | Age: 78
End: 2023-08-28
Payer: COMMERCIAL

## 2023-08-28 VITALS — HEIGHT: 63 IN | WEIGHT: 118 LBS | BODY MASS INDEX: 20.91 KG/M2

## 2023-08-28 DIAGNOSIS — I70.211 ATHEROSCLEROSIS OF NATIVE ARTERIES OF EXTREMITIES WITH INTERMITTENT CLAUDICATION, RIGHT LEG (H): ICD-10-CM

## 2023-08-28 DIAGNOSIS — I73.9 PAD (PERIPHERAL ARTERY DISEASE) (H): Primary | ICD-10-CM

## 2023-08-28 PROCEDURE — 99213 OFFICE O/P EST LOW 20 MIN: CPT | Mod: 95

## 2023-08-28 ASSESSMENT — PAIN SCALES - GENERAL: PAINLEVEL: NO PAIN (0)

## 2023-08-28 NOTE — PROGRESS NOTES
Spoke with Kimberly and scheduled 3 month follow up. Will mail AVS and pt to call sooner if any concerns.

## 2023-08-28 NOTE — PATIENT INSTRUCTIONS
"Maribel Harris,    Thank you for entrusting your care with us today. After your visit today with MD Eva Camp this is the plan that was discussed at your appointment.    Follow up in 3 months with repeat imaging done prior to your follow up. This is scheduled for 11/27/23.     I am including additional information on these things and our contact information if you have any questions or concerns.   Please do not hesitate to reach out to us if you felt we did not answer your questions or you are unsure of the treatment plan after your visit today. Our number is [phone].Thank you for trusting us with your care.         Again thank you for your time.     Patient Education   Peripheral Arterial Disease (PAD): Care Instructions  Overview  Peripheral arterial disease (PAD) occurs when the blood vessels (arteries) that supply blood to the legs, belly, pelvis, arms, or neck get narrow or blocked. This reduces blood flow to that area. The legs are affected most often.  PAD is often caused by fatty buildup (plaque) in the arteries. This buildup is also called \"hardening\" of the arteries. Your risk of PAD increases if you smoke or have high cholesterol, high blood pressure, diabetes, or a family history of PAD.  Many people don't have symptoms. If you do have symptoms, you may have weak or tired legs, difficulty walking or balancing, or pain. If you have pain, you might feel a tight, aching, or squeezing pain in the calf, foot, thigh, or buttock that occurs during exercise. The pain usually gets worse during exercise and goes away when you rest. If PAD gets worse, you may have symptoms of poor blood flow, such as leg pain when you rest.  Medicines and lifestyle changes may help your symptoms and lower your risk of heart attack and stroke. In some cases, surgery or other treatment is needed. It is important that you follow up with your doctor.  Follow-up care is a key part of your treatment and safety. Be sure to make and go " to all appointments, and call your doctor if you are having problems. It's also a good idea to know your test results and keep a list of the medicines you take.  How can you care for yourself at home?  Do not smoke. Smoking can make PAD worse. If you need help quitting, talk to your doctor about stop-smoking programs and medicines. These can increase your chances of quitting for good.  Take your medicines exactly as prescribed. Call your doctor if you think you are having a problem with your medicine.  If you take a blood thinner, such as aspirin, be sure to get instructions about how to take your medicine safely. Blood thinners can cause serious bleeding problems.  Ask your doctor if a cardiac rehab program is right for you. Cardiac rehab can help you make lifestyle changes. In cardiac rehab, a team of health professionals provides education and support to help you make new, healthy habits.  Eat heart-healthy foods such as fruits, vegetables, whole grains, fish, lean meats, and low-fat or nonfat dairy foods. Limit sodium, sugar, and alcohol.  If your doctor recommends it, get more exercise. Walking is a good choice. Bit by bit, increase the amount you walk every day. Try for at least 30 minutes on most days of the week. If you have symptoms when you exercise, ask your doctor about a special exercise program that may help relieve your symptoms.  Stay at a healthy weight. Lose weight if you need to.  Take good care of your feet.  Treat cuts and scrapes on your legs right away. Poor blood flow prevents (or slows) quick healing of even small cuts or scrapes. This is even more important if you have diabetes.  Avoid shoes that are too tight or that rub your feet. Shoes should be comfortable and fit well.  Avoid socks or stockings that are tight enough to leave elastic-band marks on your legs. Tight socks can make circulation problems worse.  Keep your feet clean and moisturized to prevent drying and cracking. Place  cotton or lamb's wool between your toes to prevent rubbing and to absorb moisture.  If you have a sore on your leg or foot, keep it dry and cover it with a nonstick bandage until you see your doctor.  Avoid infections such as COVID-19, colds, and the flu. Get the flu vaccine every year. Get a pneumococcal vaccine. If you have had one before, ask your doctor whether you need another dose. Stay up to date on your COVID-19 vaccines.  When should you call for help?   Call 911 anytime you think you may need emergency care. For example, call if:    You have symptoms of a heart attack. These may include:  Chest pain or pressure, or a strange feeling in the chest.  Sweating.  Shortness of breath.  Nausea or vomiting.  Pain, pressure, or a strange feeling in the back, neck, jaw, or upper belly or in one or both shoulders or arms.  Lightheadedness or sudden weakness.  A fast or irregular heartbeat.   After you call 911, the  may tell you to chew 1 adult-strength or 2 to 4 low-dose aspirin. Wait for an ambulance. Do not try to drive yourself.    You have sudden, severe leg pain, and your leg is cool and pale.     You have symptoms of a stroke. These may include:  Sudden numbness, tingling, weakness, or loss of movement in your face, arm, or leg, especially on only one side of your body.  Sudden vision changes.  Sudden trouble speaking.  Sudden confusion or trouble understanding simple statements.  Sudden problems with walking or balance.  A sudden, severe headache that is different from past headaches.   Call your doctor now or seek immediate medical care if:    You have leg pain that does not go away even if you rest.     Your leg pain changes or gets worse. For example, if you have more pain with normal activity or the same pain with decreased activity, you should call.     You have cold or numb feet or toes.     You have leg or foot sores that are slow to heal.     The skin on your legs or feet changes color.      "You have an open sore on your leg or foot that is infected. Signs of infection include:  Increased pain, swelling, warmth, or redness.  Red streaks leading from the sore.  Pus draining from the sore.  A fever.   Watch closely for changes in your health, and be sure to contact your doctor if you have any problems.  Where can you learn more?  Go to https://www."[employer]net/patiented  Enter H735 in the search box to learn more about \"Peripheral Arterial Disease (PAD): Care Instructions.\"  Current as of: September 7, 2022               Content Version: 13.7    2121-0242 [employer].   Care instructions adapted under license by your healthcare professional. If you have questions about a medical condition or this instruction, always ask your healthcare professional. [employer] disclaims any warranty or liability for your use of this information.               "

## 2023-08-28 NOTE — NURSING NOTE
Patient confirms medications and allergies are accurate via patients echeck in completion, and or denies any changes since last reviewed/verified.     Samra Rees, Virtual Facilitator    Is the patient currently in the state of [place]? YES    Visit mode:TELEPHONE    If the visit is dropped, the patient can be reconnected by: TELEPHONE VISIT: Phone number: [phone]    Will anyone else be joining the visit? NO  (If patient encounters technical issues they should call [phone]    How would you like to obtain your AVS? Mail a copy    Are changes needed to the allergy or medication list? No    Reason for visit: RECHECK    Samra Rees VVF

## 2023-08-28 NOTE — PROGRESS NOTES
Phone visit note:    Patient was verified using 2-patient identifiers.    Patient is doing well with no claudication, rest pain, or new wounds. She has been walking well.    ANNA and duplex results were discussed with the patient and they look improved without in-stent stenosis.    Discussed continuation of aspirin, plavix (at least 6 weeks), and statin along with other medical management.    Patient will follow-up with us in 3 months with another set of non-invasive studies.    D/w Dr. Butt    Platform: [facility]

## 2023-08-28 NOTE — PROGRESS NOTES
"[facility]      Type of Visit: Virtual: Telephone    Patient is here for a return visit to discuss  4 week follow up .  .     Ht 1.6 m (5' 3\")   Wt 53.5 kg (118 lb)   LMP  (LMP Unknown)   BMI 20.90 kg/m      Questions patient would like addressed today are: Patient verbalized no questions/concerns, this has been communicated to the provider.     Refills are needed: No    How would you like to obtain your AVS? Mail a copy    Virtual Visit Details    Type of service:  Telephone Visit   Phone call duration: 5 minutes     Samra Rees  "

## 2023-10-17 ENCOUNTER — OFFICE VISIT (OUTPATIENT)
Dept: INTERNAL MEDICINE | Facility: CLINIC | Age: 78
End: 2023-10-17
Payer: COMMERCIAL

## 2023-10-17 VITALS
HEART RATE: 76 BPM | SYSTOLIC BLOOD PRESSURE: 130 MMHG | BODY MASS INDEX: 23.21 KG/M2 | RESPIRATION RATE: 20 BRPM | DIASTOLIC BLOOD PRESSURE: 80 MMHG | WEIGHT: 131 LBS

## 2023-10-17 DIAGNOSIS — Z72.0 TOBACCO USE: ICD-10-CM

## 2023-10-17 DIAGNOSIS — I99.9 MILD VASCULAR NEUROCOGNITIVE DISORDER: ICD-10-CM

## 2023-10-17 DIAGNOSIS — R74.8 ELEVATED ALKALINE PHOSPHATASE MEASUREMENT: ICD-10-CM

## 2023-10-17 DIAGNOSIS — L82.1 SEBORRHEIC KERATOSES: ICD-10-CM

## 2023-10-17 DIAGNOSIS — I10 ESSENTIAL HYPERTENSION: ICD-10-CM

## 2023-10-17 DIAGNOSIS — E78.2 MIXED HYPERLIPIDEMIA: ICD-10-CM

## 2023-10-17 DIAGNOSIS — Z87.891 PERSONAL HISTORY OF TOBACCO USE: ICD-10-CM

## 2023-10-17 DIAGNOSIS — M54.50 ACUTE RIGHT-SIDED LOW BACK PAIN WITHOUT SCIATICA: ICD-10-CM

## 2023-10-17 DIAGNOSIS — I73.9 PAD (PERIPHERAL ARTERY DISEASE) (H): Primary | ICD-10-CM

## 2023-10-17 DIAGNOSIS — F06.70 MILD VASCULAR NEUROCOGNITIVE DISORDER: ICD-10-CM

## 2023-10-17 LAB
ALBUMIN SERPL BCG-MCNC: 4.5 G/DL (ref 3.5–5.2)
ALP SERPL-CCNC: 111 U/L (ref 35–104)
ALT SERPL W P-5'-P-CCNC: 15 U/L (ref 0–50)
ANION GAP SERPL CALCULATED.3IONS-SCNC: 11 MMOL/L (ref 7–15)
AST SERPL W P-5'-P-CCNC: 27 U/L (ref 0–45)
BILIRUB DIRECT SERPL-MCNC: <0.2 MG/DL (ref 0–0.3)
BILIRUB SERPL-MCNC: 0.2 MG/DL
BUN SERPL-MCNC: 17.8 MG/DL (ref 8–23)
CALCIUM SERPL-MCNC: 9.5 MG/DL (ref 8.8–10.2)
CHLORIDE SERPL-SCNC: 105 MMOL/L (ref 98–107)
CHOLEST SERPL-MCNC: 175 MG/DL
CREAT SERPL-MCNC: 0.71 MG/DL (ref 0.51–0.95)
DEPRECATED HCO3 PLAS-SCNC: 26 MMOL/L (ref 22–29)
EGFRCR SERPLBLD CKD-EPI 2021: 87 ML/MIN/1.73M2
GLUCOSE SERPL-MCNC: 94 MG/DL (ref 70–99)
HDLC SERPL-MCNC: 99 MG/DL
LDLC SERPL CALC-MCNC: 59 MG/DL
NONHDLC SERPL-MCNC: 76 MG/DL
POTASSIUM SERPL-SCNC: 5 MMOL/L (ref 3.4–5.3)
PROT SERPL-MCNC: 7.1 G/DL (ref 6.4–8.3)
SODIUM SERPL-SCNC: 142 MMOL/L (ref 135–145)
TRIGL SERPL-MCNC: 86 MG/DL

## 2023-10-17 PROCEDURE — G0296 VISIT TO DETERM LDCT ELIG: HCPCS | Performed by: NURSE PRACTITIONER

## 2023-10-17 PROCEDURE — 36415 COLL VENOUS BLD VENIPUNCTURE: CPT | Performed by: NURSE PRACTITIONER

## 2023-10-17 PROCEDURE — 99214 OFFICE O/P EST MOD 30 MIN: CPT | Performed by: NURSE PRACTITIONER

## 2023-10-17 PROCEDURE — 86381 MITOCHONDRIAL ANTIBODY EACH: CPT | Performed by: NURSE PRACTITIONER

## 2023-10-17 PROCEDURE — 82248 BILIRUBIN DIRECT: CPT | Performed by: NURSE PRACTITIONER

## 2023-10-17 PROCEDURE — 80053 COMPREHEN METABOLIC PANEL: CPT | Performed by: NURSE PRACTITIONER

## 2023-10-17 PROCEDURE — 80061 LIPID PANEL: CPT | Performed by: NURSE PRACTITIONER

## 2023-10-17 RX ORDER — ATORVASTATIN CALCIUM 40 MG/1
40 TABLET, FILM COATED ORAL DAILY
Qty: 90 TABLET | Refills: 0 | Status: SHIPPED | OUTPATIENT
Start: 2023-10-17 | End: 2023-10-19

## 2023-10-17 RX ORDER — AMLODIPINE BESYLATE 10 MG/1
10 TABLET ORAL DAILY
Qty: 90 TABLET | Refills: 0 | Status: SHIPPED | OUTPATIENT
Start: 2023-10-17 | End: 2023-10-19

## 2023-10-17 RX ORDER — VARENICLINE TARTRATE 1 MG/1
1 TABLET, FILM COATED ORAL 2 TIMES DAILY
Qty: 60 TABLET | Refills: 0 | Status: SHIPPED | OUTPATIENT
Start: 2023-10-17 | End: 2024-03-14

## 2023-10-17 RX ORDER — CYCLOBENZAPRINE HCL 5 MG
5-10 TABLET ORAL 3 TIMES DAILY PRN
Qty: 20 TABLET | Refills: 1 | Status: SHIPPED | OUTPATIENT
Start: 2023-10-17 | End: 2024-07-17

## 2023-10-17 RX ORDER — VARENICLINE TARTRATE 0.5 (11)-1
KIT ORAL
Qty: 53 TABLET | Refills: 0 | Status: SHIPPED | OUTPATIENT
Start: 2023-10-17 | End: 2024-03-14

## 2023-10-17 ASSESSMENT — PAIN SCALES - GENERAL: PAINLEVEL: NO PAIN (0)

## 2023-10-17 NOTE — ASSESSMENT & PLAN NOTE
Interested in quitting. Would like to try Chantix. Reviewed directions for use and possible side effects    Lung Cancer Screening Shared Decision Making Visit     Kimberly Hernandez, a 78 year old female, is eligible for lung cancer screening    History   Smoking Status    Every Day    Packs/day: 0.50    Years: 50.00    Types: Cigarettes   Smokeless Tobacco    Never     I have discussed with patient the risks and benefits of screening for lung cancer with low-dose CT.     The risks include:    radiation exposure: one low dose chest CT has as much ionizing radiation as about 15 chest x-rays, or 6 months of background radiation living in [place]      false positives: most findings/nodules are NOT cancer, but some might still require additional diagnostic evaluation, including biopsy    over-diagnosis: some slow growing cancers that might never have been clinically significant will be detected and treated unnecessarily     The benefit of early detection of lung cancer is contingent upon adherence to annual screening or more frequent follow up if indicated.     Furthermore, to benefit from screening, Kimberly must be willing and able to undergo diagnostic procedures, if indicated. Although no specific guide is available for determining severity of comorbidities, it is reasonable to withhold screening in patients who have greater mortality risk from other diseases.     We did discuss that the best way to prevent lung cancer is to not smoke.

## 2023-10-17 NOTE — LETTER
October 18, 2023      Kimberly Hernandez  [address] [address]        Dear ,    We are writing to inform you of your test results.    Your cholesterol levels look excellent.  Continue to take the atorvastatin (Lipitor) 40 mg daily.    Electrolytes and kidney function labs are normal.    One of the liver enzymes that we have been monitoring, alkaline phosphatase, is mildly elevated but improved in comparison to the last 2 readings.  We will continue to monitor this.  Resulted Orders   Lipid panel reflex to direct LDL Fasting   Result Value Ref Range    Cholesterol 175 <200 mg/dL    Triglycerides 86 <150 mg/dL    Direct Measure HDL 99 >=50 mg/dL    LDL Cholesterol Calculated 59 <=100 mg/dL    Non HDL Cholesterol 76 <130 mg/dL    Narrative    Cholesterol  Desirable:  <200 mg/dL    Triglycerides  Normal:  Less than 150 mg/dL  Borderline High:  150-199 mg/dL  High:  200-499 mg/dL  Very High:  Greater than or equal to 500 mg/dL    Direct Measure HDL  Female:  Greater than or equal to 50 mg/dL   Male:  Greater than or equal to 40 mg/dL    LDL Cholesterol  Desirable:  <100mg/dL  Above Desirable:  100-129 mg/dL   Borderline High:  130-159 mg/dL   High:  160-189 mg/dL   Very High:  >= 190 mg/dL    Non HDL Cholesterol  Desirable:  130 mg/dL  Above Desirable:  130-159 mg/dL  Borderline High:  160-189 mg/dL  High:  190-219 mg/dL  Very High:  Greater than or equal to 220 mg/dL   BASIC METABOLIC PANEL   Result Value Ref Range    Sodium 142 135 - 145 mmol/L      Comment:      Reference intervals for this test were updated on 09/26/2023 to more accurately reflect our healthy population. There may be differences in the flagging of prior results with similar values performed with this method. Interpretation of those prior results can be made in the context of the updated reference intervals.     Potassium 5.0 3.4 - 5.3 mmol/L    Chloride 105 98 - 107 mmol/L    Carbon Dioxide (CO2) 26 22 - 29 mmol/L     Anion Gap 11 7 - 15 mmol/L    Urea Nitrogen 17.8 8.0 - 23.0 mg/dL    Creatinine 0.71 0.51 - 0.95 mg/dL    GFR Estimate 87 >60 mL/min/1.73m2    Calcium 9.5 8.8 - 10.2 mg/dL    Glucose 94 70 - 99 mg/dL   Hepatic panel (Albumin, ALT, AST, Bili, Alk Phos, TP)   Result Value Ref Range    Protein Total 7.1 6.4 - 8.3 g/dL    Albumin 4.5 3.5 - 5.2 g/dL    Bilirubin Total 0.2 <=1.2 mg/dL    Alkaline Phosphatase 111 (H) 35 - 104 U/L    AST 27 0 - 45 U/L      Comment:      Reference intervals for this test were updated on 6/12/2023 to more accurately reflect our healthy population. There may be differences in the flagging of prior results with similar values performed with this method. Interpretation of those prior results can be made in the context of the updated reference intervals.    ALT 15 0 - 50 U/L      Comment:      Reference intervals for this test were updated on 6/12/2023 to more accurately reflect our healthy population. There may be differences in the flagging of prior results with similar values performed with this method. Interpretation of those prior results can be made in the context of the updated reference intervals.      Bilirubin Direct <0.20 0.00 - 0.30 mg/dL       If you have any questions or concerns, please call the clinic at the number listed above.       Sincerely,      Luz Cedillo NP

## 2023-10-17 NOTE — PATIENT INSTRUCTIONS
Lung Cancer Screening   Frequently Asked Questions  If you are at high-risk for lung cancer, getting screened with low-dose computed tomography (LDCT) every year can help save your life. This handout offers answers to some of the most common questions about lung cancer screening. If you have other questions, please call [phone] ([phone]).     What is it?  Lung cancer screening uses special X-ray technology to create an image of your lung tissue. The exam is quick and easy and takes less than 10 seconds. We don t give you any medicine or use any needles. You can eat before and after the exam. You don t need to change your clothes as long as the clothing on your chest doesn t contain metal. But, you do need to be able to hold your breath for at least 6 seconds during the exam.    What is the goal of lung cancer screening?  The goal of lung cancer screening is to save lives. Many times, lung cancer is not found until a person starts having physical symptoms. Lung cancer screening can help detect lung cancer in the earliest stages when it may be easier to treat.    Who should be screened for lung cancer?  We suggest lung cancer screening for anyone who is at high-risk for lung cancer. You are in the high-risk group if you:      are between the ages of 55 and 79, and    have smoked at least 1 pack of cigarettes a day for 20 or more years, and    still smoke or have quit within the past 15 years.    However, if you have a new cough or shortness of breath, you should talk to your doctor before being screened.    Why does it matter if I have symptoms?  Certain symptoms can be a sign that you have a condition in your lungs that should be checked and treated by your doctor. These symptoms include fever, chest pain, a new or changing cough, shortness of breath that you have never felt before, coughing up blood or unexplained weight loss. Having any of these symptoms can greatly affect the results of lung  cancer screening.       Should all smokers get an LDCT lung cancer screening exam?  It depends. Lung cancer screening is for a very specific group of men and women who have a history of heavy smoking over a long period of time (see  Who should be screened for lung cancer  above).  I am in the high-risk group, but have been diagnosed with cancer in the past. Is LDCT lung cancer screening right for me?  In some cases, you should not have LDCT lung screening, such as when your doctor is already following your cancer with CT scan studies. Your doctor will help you decide if LDCT lung screening is right for you.  Do I need to have a screening exam every year?  Yes. If you are in the high-risk group described earlier, you should get an LDCT lung cancer screening exam every year until you are 79, or are no longer willing or able to undergo screening and possible procedures to diagnose and treat lung cancer.  How effective is LDCT at preventing death from lung cancer?  Studies have shown that LDCT lung cancer screening can lower the risk of death from lung cancer by 20 percent in people who are at high-risk.  What are the risks?  There are some risks and limitations of LDCT lung cancer screening. We want to make sure you understand the risks and benefits, so please let us know if you have any questions. Your doctor may want to talk with you more about these risks.    Radiation exposure: As with any exam that uses radiation, there is a very small increased risk of cancer. The amount of radiation in LDCT is small--about the same amount a person would get from a mammogram. Your doctor orders the exam when he or she feels the potential benefits outweigh the risks.    False negatives: No test is perfect, including LDCT. It is possible that you may have a medical condition, including lung cancer, that is not found during your exam. This is called a false negative result.    False positives and more testing: LDCT very often finds  something in the lung that could be cancer, but in fact is not. This is called a false positive result. False positive tests often cause anxiety. To make sure these findings are not cancer, you may need to have more tests. These tests will be done only if you give us permission. Sometimes patients need a treatment that can have side effects, such as a biopsy. For more information on false positives, see  What can I expect from the results?     Findings not related to lung cancer: Your LDCT exam also takes pictures of areas of your body next to your lungs. In a very small number of cases, the CT scan will show an abnormal finding in one of these areas, such as your kidneys, adrenal glands, liver or thyroid. This finding may not be serious, but you may need more tests. Your doctor can help you decide what other tests you may need, if any.  What can I expect from the results?  About 1 out of 4 LDCT exams will find something that may need more tests. Most of the time, these findings are lung nodules. Lung nodules are very small collections of tissue in the lung. These nodules are very common, and the vast majority--more than 97 percent--are not cancer (benign). Most are normal lymph nodes or small areas of scarring from past infections.  But, if a small lung nodule is found to be cancer, the cancer can be cured more than 90 percent of the time. To know if the nodule is cancer, we may need to get more images before your next yearly screening exam. If the nodule has suspicious features (for example, it is large, has an odd shape or grows over time), we will refer you to a specialist for further testing.  Will my doctor also get the results?  Yes. Your doctor will get a copy of your results.  Is it okay to keep smoking now that there s a cancer screening exam?  No. Tobacco is one of the strongest cancer-causing agents. It causes not only lung cancer, but other cancers and cardiovascular (heart) diseases as well. The damage  caused by smoking builds over time. This means that the longer you smoke, the higher your risk of disease. While it is never too late to quit, the sooner you quit, the better.  Where can I find help to quit smoking?  The best way to prevent lung cancer is to stop smoking. If you have already quit smoking, congratulations and keep it up! For help on quitting smoking, please call [employer] at 9-366-QUITNOW ([phone]) or the [employer] at 1-655.211.1633 to find local resources near you.  One-on-one health coaching:  If you d prefer to work individually with a health care provider on tobacco cessation, we offer:      Medication Therapy Management:  Our specially trained pharmacists work closely with you and your doctor to help you quit smoking.  Call [phone] or [phone] (toll free).

## 2023-10-17 NOTE — PROGRESS NOTES
Assessment & Plan   Problem List Items Addressed This Visit       Hyperlipidemia     Lipid profile today  Continue statin         Relevant Medications    atorvastatin (LIPITOR) 40 MG tablet    Elevated alkaline phosphatase measurement     Repeat  hepatic profile today          Mild vascular neurocognitive disorder     She has concerns regarding her memory and she is frequently forgetful.  She had a neuropsychology evaluation which showed mild impairment.  Recommended follow-up was 12 to 18 months, referral to neuropsychology evaluation is placed today.         Relevant Orders    Adult Mental Health  Referral    Tobacco use     Interested in quitting. Would like to try Chantix. Reviewed directions for use and possible side effects         Relevant Medications    varenicline (CHANTIX CECILIO) 0.5 MG X 11 & 1 MG X 42 tablet    varenicline (CHANTIX) 1 MG tablet    Essential hypertension     Normotensive         Relevant Medications    amLODIPine (NORVASC) 10 MG tablet    Other Relevant Orders    BASIC METABOLIC PANEL (Completed)    Seborrheic keratoses     Referral is placed to dermatology located in this building, [facility] dermatology.         Relevant Orders    Adult Dermatology  Referral    PAD (peripheral artery disease) (H24) - Primary     Continue statin, aspirin  Strongly encouraged tobacco cessation.         Relevant Orders    Hepatic panel (Albumin, ALT, AST, Bili, Alk Phos, TP) (Completed)    Lipid panel reflex to direct LDL Fasting     Other Visit Diagnoses       Acute right-sided low back pain without sciatica        No red flag findings.  Cyclobenzaprine prescribed for use as needed.  Referral placed to physical therapy.  Follow-up in 4 to 6 weeks if needed    Relevant Medications    cyclobenzaprine (FLEXERIL) 5 MG tablet    Other Relevant Orders    Physical Therapy Referral    Personal history of tobacco use        Relevant Orders    Prof fee: Shared Decision Making for Lung Cancer Screening  (Completed)    CT Chest Lung Cancer Scrn Low Dose wo              Nicotine/Tobacco Cessation:  She reports that she has been smoking cigarettes. She has a 25.00 pack-year smoking history. She has never used smokeless tobacco.  Nicotine/Tobacco Cessation Plan:   Pharmacotherapies : varenicline (Chantix)    NADIYA Sargent [facility] EVAN Harris is a 78 year old, presenting for the following health issues:  Follow Up        10/17/2023     2:10 PM   Additional Questions   Roomed by BRANDON Moncada     Hyperlipidemia Follow-Up    Are you regularly taking any medication or supplement to lower your cholesterol?   Yes-    Are you having muscle aches or other side effects that you think could be caused by your cholesterol lowering medication?  No    Hypertension Follow-up    Do you check your blood pressure regularly outside of the clinic? Yes   Are you following a low salt diet? No  Are your blood pressures ever more than 140 on the top number (systolic) OR more   than 90 on the bottom number (diastolic), for example 140/90? No    1.5 months ago she took a road trip to [place] with her partner. She had pain throughout the day which was on the right low back and into the right buttock. No loss of bowel or bladder control. The pain then resolved. About 2 weeks ago she again felt the pain again. Sometimes at night she has the pain when she first lays down.     Tobacco use-she is interested in tobacco cessation and is open to discussing pharmacologic agents to help her in this.           Objective    /80   Pulse 76   Resp 20   Wt 59.4 kg (131 lb)   LMP  (LMP Unknown)   BMI 23.21 kg/m    Body mass index is 23.21 kg/m .    Wt Readings from Last 5 Encounters:   10/17/23 59.4 kg (131 lb)   08/28/23 53.5 kg (118 lb)   06/19/23 57.7 kg (127 lb 4.8 oz)   05/04/23 55.9 kg (123 lb 4.8 oz)   05/02/23 55.8 kg (123 lb)     Physical Exam   GENERAL: Alert and no distress  RESP: lungs clear  to auscultation - no rales, rhonchi or wheezes  CV: regular rate and rhythm, normal S1 S2  MS: no spinous process or paraspinal tenderness. Normal gait

## 2023-10-18 RX ORDER — VARENICLINE TARTRATE 1 MG/1
1 TABLET, FILM COATED ORAL 2 TIMES DAILY
Qty: 180 TABLET | OUTPATIENT
Start: 2023-10-18

## 2023-10-19 RX ORDER — AMLODIPINE BESYLATE 10 MG/1
10 TABLET ORAL DAILY
Qty: 90 TABLET | Refills: 1 | Status: SHIPPED | OUTPATIENT
Start: 2023-10-19 | End: 2024-03-14

## 2023-10-19 RX ORDER — ATORVASTATIN CALCIUM 40 MG/1
40 TABLET, FILM COATED ORAL DAILY
Qty: 90 TABLET | Refills: 3 | Status: SHIPPED | OUTPATIENT
Start: 2023-10-19 | End: 2024-09-16

## 2023-10-19 NOTE — ASSESSMENT & PLAN NOTE
Referral is placed to dermatology located in this building, [facility] dermatology.

## 2023-10-19 NOTE — ASSESSMENT & PLAN NOTE
She has concerns regarding her memory and she is frequently forgetful.  She had a neuropsychology evaluation which showed mild impairment.  Recommended follow-up was 12 to 18 months, referral to neuropsychology evaluation is placed today.

## 2023-10-20 LAB — MITOCHONDRIA M2 IGG SER-ACNC: 1.8 U/ML

## 2023-10-23 ENCOUNTER — HOSPITAL ENCOUNTER (OUTPATIENT)
Dept: CT IMAGING | Facility: HOSPITAL | Age: 78
Discharge: HOME | End: 2023-10-23
Attending: NURSE PRACTITIONER | Admitting: NURSE PRACTITIONER
Payer: COMMERCIAL

## 2023-10-23 DIAGNOSIS — Z87.891 PERSONAL HISTORY OF TOBACCO USE: ICD-10-CM

## 2023-10-23 PROCEDURE — 71271 CT THORAX LUNG CANCER SCR C-: CPT

## 2023-11-14 ENCOUNTER — THERAPY VISIT (OUTPATIENT)
Dept: PHYSICAL THERAPY | Facility: REHABILITATION | Age: 78
End: 2023-11-14
Attending: NURSE PRACTITIONER
Payer: COMMERCIAL

## 2023-11-14 DIAGNOSIS — M54.50 ACUTE RIGHT-SIDED LOW BACK PAIN WITHOUT SCIATICA: ICD-10-CM

## 2023-11-14 PROCEDURE — 97161 PT EVAL LOW COMPLEX 20 MIN: CPT | Mod: GP | Performed by: PHYSICAL THERAPIST

## 2023-11-14 PROCEDURE — 97110 THERAPEUTIC EXERCISES: CPT | Mod: GP | Performed by: PHYSICAL THERAPIST

## 2023-11-14 NOTE — PROGRESS NOTES
PHYSICAL THERAPY EVALUATION  Type of Visit: Evaluation    See electronic medical record for Abuse and Falls Screening details.    Subjective       Presenting condition or subjective complaint:    Date of onset: 10/17/23    Relevant medical history:     Dates & types of surgery:      Prior diagnostic imaging/testing results:       Prior therapy history for the same diagnosis, illness or injury:        Patient reports walking short distances (20 yards) will have increased lower back pain. This happened 4 or 5 times. Currently not having pain, seems to worsen with exertion. Symptoms began about 1 year ago, no injury or trauma. Symptoms are mostly right lower thoracic and upper lumbar. Currently able to walk 4 laps around indoor track at the [facility] without pain (unsure how long this took). 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 Reports tylenol is not providing relief. Also reports last week was able to walk 1 mile without difficulty.       Prior Level of Function  Previously able to walk for miles. Currently limited some days to 20 yards, or 1/4 mile. Some days able to walk 1 mile.     Living Environment  Social support:     Type of home:     Stairs to enter the home:         Ramp:     Stairs inside the home:         Help at home:    Equipment owned:       Employment:    retired NP  Hobbies/Interests:  walking, traveling, cruise    Patient goals for therapy:  walk longer distances, walk at least 1 mile without being limited due to pain         Objective   LUMBAR SPINE EVALUATION  PAIN: Pain Level at Rest: 0/10  Pain Level with Use: 8/10  Pain Location: right lower thoracic  Pain Quality: unable to describe  Pain Frequency: intermittent  Pain is Worst: daytime  Pain is Exacerbated By: walking 20 yards  Pain is Relieved By: difficult to get relief, sitting  Pain Progression: Unchanged  INTEGUMENTARY (edema, incisions):   POSTURE: Standing Posture: Rounded shoulders, Forward head,  Thoracic kyphosis increased  GAIT:   Weightbearing Status:   Assistive Device(s):   Gait Deviations:   BALANCE/PROPRIOCEPTION:   WEIGHTBEARING ALIGNMENT:   NON-WEIGHTBEARING ALIGNMENT:    ROM:  lumbar ROM flexion and extension limited by 25%, non-painful  PELVIC/SI SCREEN:   STRENGTH:  prone trunk extension 4/5    MYOTOMES: WNL  DTR S: WNL  CORD SIGNS:   DERMATOMES:   NEURAL TENSION:   FLEXIBILITY:   LUMBAR/HIP Special Tests:    PELVIS/SI SPECIAL TESTS:   FUNCTIONAL TESTS:   PALPATION:  pain to lower thoracic right costovertebral joints  SPINAL SEGMENTAL CONCLUSIONS:       30 second sit to stand: 12 reps    Assessment & Plan   CLINICAL IMPRESSIONS  Medical Diagnosis: M54.50 (ICD-10-CM) - Acute right-sided low back pain without sciatica    Treatment Diagnosis: low back pain with postural strain through thoracolumbar region   Impression/Assessment: Patient is a 78 year old female with chief complaints of back pain.  The following significant findings have been identified: Pain, Impaired muscle performance, Decreased activity tolerance, and Impaired posture. These impairments interfere with their ability to perform recreational activities, household chores, and community mobility as compared to previous level of function.     Clinical Decision Making (Complexity):  Clinical Presentation: Stable/Uncomplicated  Clinical Presentation Rationale: based on medical and personal factors listed in PT evaluation  Clinical Decision Making (Complexity): Low complexity    PLAN OF CARE  Treatment Interventions:  Interventions: Manual Therapy, Neuromuscular Re-education, Therapeutic Activity, Therapeutic Exercise, Self-Care/Home Management    Long Term Goals     PT Goal 1  Goal Identifier: HEP  Goal Description: Patient will demonstrate >50% compliance with home exercise program to promote independence and progress towards  Target Date: 02/07/24      Frequency of Treatment: every other week  Duration of Treatment: 12  weeks    Recommended Referrals to Other Professionals:   Education Assessment:   Learner/Method: Patient    Risks and benefits of evaluation/treatment have been explained.   Patient/Family/caregiver agrees with Plan of Care.     Evaluation Time:     PT Eval, Low Complexity Minutes (18711): 25       Signing Clinician: Yasmany Lyn PT      [facility]                                                                                   OUTPATIENT PHYSICAL THERAPY      PLAN OF TREATMENT FOR OUTPATIENT REHABILITATION   Patient's Last Name, First Name, Kimberly Holman YOB: 1945   Provider's Name   [facility]   Medical Record No.  [id number]     Onset Date: 10/17/23  Start of Care Date: 11/14/23     Medical Diagnosis:  M54.50 (ICD-10-CM) - Acute right-sided low back pain without sciatica      PT Treatment Diagnosis:  low back pain with postural strain through thoracolumbar region Plan of Treatment  Frequency/Duration: every other week/ 12 weeks    Certification date from 11/14/23 to 02/07/24         See note for plan of treatment details and functional goals     Yasmany Lyn PT                         I CERTIFY THE NEED FOR THESE SERVICES FURNISHED UNDER        THIS PLAN OF TREATMENT AND WHILE UNDER MY CARE     (Physician attestation of this document indicates review and certification of the therapy plan).              Referring Provider:  Luz Cedillo    Initial Assessment  See Epic Evaluation- Start of Care Date: 11/14/23

## 2023-12-06 DIAGNOSIS — G40.909 NONINTRACTABLE EPILEPSY WITHOUT STATUS EPILEPTICUS, UNSPECIFIED EPILEPSY TYPE (H): ICD-10-CM

## 2023-12-06 RX ORDER — PHENYTOIN SODIUM 100 MG/1
CAPSULE, EXTENDED RELEASE ORAL
Qty: 270 CAPSULE | Refills: 1 | Status: SHIPPED | OUTPATIENT
Start: 2023-12-06 | End: 2024-06-12

## 2023-12-07 DIAGNOSIS — G40.909 NONINTRACTABLE EPILEPSY WITHOUT STATUS EPILEPTICUS, UNSPECIFIED EPILEPSY TYPE (H): ICD-10-CM

## 2023-12-07 RX ORDER — PHENYTOIN SODIUM 100 MG/1
CAPSULE, EXTENDED RELEASE ORAL
Qty: 270 CAPSULE | Refills: 1 | OUTPATIENT
Start: 2023-12-07

## 2023-12-26 DIAGNOSIS — E78.2 MIXED HYPERLIPIDEMIA: ICD-10-CM

## 2023-12-27 ENCOUNTER — THERAPY VISIT (OUTPATIENT)
Dept: PHYSICAL THERAPY | Facility: REHABILITATION | Age: 78
End: 2023-12-27
Payer: COMMERCIAL

## 2023-12-27 DIAGNOSIS — M54.50 ACUTE RIGHT-SIDED LOW BACK PAIN WITHOUT SCIATICA: Primary | ICD-10-CM

## 2023-12-27 PROCEDURE — 97110 THERAPEUTIC EXERCISES: CPT | Mod: GP | Performed by: PHYSICAL THERAPIST

## 2023-12-27 RX ORDER — ATORVASTATIN CALCIUM 40 MG/1
40 TABLET, FILM COATED ORAL DAILY
Qty: 90 TABLET | Refills: 3 | OUTPATIENT
Start: 2023-12-27

## 2023-12-27 NOTE — PROGRESS NOTES
"    DISCHARGE  Reason for Discharge: Patient has met all goals.    Equipment Issued: theraband    Discharge Plan: Patient to continue home program.    Referring Provider:  Luz Cedillo       12/27/23 0500   Appointment Info   Signing clinician's name / credentials Yasmany Lyn PT   Total/Authorized Visits 6   Visits Used 2   Medical Diagnosis M54.50 (ICD-10-CM) - Acute right-sided low back pain without sciatica   PT Tx Diagnosis low back pain with postural strain through thoracolumbar region   Precautions/Limitations none   Quick Adds Certification   Progress Note/Certification   Start of Care Date 11/14/23   Onset of illness/injury or Date of Surgery 10/17/23   Therapy Frequency every other week   Predicted Duration 12 weeks   Certification date from 11/14/23   Certification date to 02/07/24   Progress Note Due Date 02/07/24   PT Goal 1   Goal Identifier HEP   Goal Description Patient will demonstrate >50% compliance with home exercise program to promote independence and progress towards   Target Date 02/07/24   Subjective Report   Subjective Report patient feels good. has been consistent with her exercises. reports back is feeling a lot better, gets \"twinges\" every once and a while but goes away with continued activity going on a cruise in late February.   Treatment Interventions (PT)   Interventions Therapeutic Procedure/Exercise   Therapeutic Procedure/Exercise   Therapeutic Procedures: strength, endurance, ROM, flexibillity minutes (85174) 40   Skilled Intervention Patient was instructed in their home exercise program. Patient performed at least 1 set of each exercise during the session. Cues were provided to ensure proper movement and control. Patient reported appropriate tolerance with each exercise. Encouraged patient to resume and perform walking of 1 mile consistently to improve pain and function   Ther Proc 1 - Details treadmill 2.0-2.5mph using bilateral railings for support x6 minutes. standing lumbar " flexion and extension A/ROM. tandem balance x10 sec B using railing intermittently for balance. standing hip abduction with railing support x 10 reps B. standing hip extension x 10 reps B using railing for balance. bridge x10 reps (cues to achieve neutral hip extension. supine shoulder flexion towel roll across mid back x 10 reps. seated lat pulldown L5 RB x15 reps. shoulder ER L2 RB x15 reps. seated thoracic extension x10 reps   Education   Learner/Method Patient   Plan   Plan for next session Discharged   Total Session Time   Timed Code Treatment Minutes 40   Total Treatment Time (sum of timed and untimed services) 40

## 2024-01-05 NOTE — PATIENT INSTRUCTIONS
Maribel Harris,    Thank you for entrusting your care with us today. After your visit today with BINTA Saleem this is the plan that was discussed at your appointment.    We will contact you in a couple if months to schedule 6 month follow-up with studies      I am including additional information on these things and our contact information if you have any questions or concerns.   Please do not hesitate to reach out to us if you felt we did not answer your questions or you are unsure of the treatment plan after your visit today. Our number is [phone].Thank you for trusting us with your care.         Again thank you for your time.      Lower Extremity Arterial Ultrasound    Description  Ultrasound examinations are painless and easy for the patient. The vascular laboratory will contain a bed and just two or three pieces of equipment. You will be asked to remove pants or shorts and gowns will be provided. It usually takes about 30 minutes.  The technician will tuck a towel under your underpants in the groin. The gel is water-soluble and will not stain your skin or clothes.  Ultrasound gel, usually warmed for your comfort, will be placed on the inner side of your legs.    Through the gel, the technician will apply to your legs a small hand-held device that emits sound waves.  When the test is completed, the technician will remove excess gel from your legs.    Risks  There are typically no side effects or complications associated with a lower extremity arterial duplex ultrasound.  How to Prepare  Eat and take medications as usual.  There is no preparation required for a lower extremity arterial duplex ultrasound.  What Can I Expect After the Test?  The technician will send the ultrasound images to your vascular surgeon for evaluation. Typically, a report is available in 2-3 days. If anything critical is found, it is standard practice to notify the vascular surgeon immediately.  Reference:  https://vascular.org/patient-resources/vascular-tests     Ankle-Brachial Index (ANNA) or Physiologic Test    Description  An ankle-brachial index test is relatively pain free. Blood pressure cuffs of various sizes are placed on your thigh, calf, foot and toes.  Similar to having your blood pressure checked with an arm cuff, as the technician inflates the cuffs, they progressively tighten and are then quickly released.  You may feel some discomfort, but generally for less than 60 seconds for each measurement. You will be asked to remove your socks and shoes and possibly your pants or shorts. Gowns will be provided. It usually takes about 30-60 minutes.   Depending on the initial readings and patient symptoms, you may be asked to perform a light walk on a treadmill.  The technician will apply ultrasound gel, usually warmed for your comfort, to your ankles and wrists. Through the gel, the technician will use a small hand-held device that emits sound waves.  Risks  There are typically no side effects or complications associated with a physiologic study.  How to Prepare  Eat and take medications as usual.  There is no preparation required for an ankle-brachial index (ANNA) or physiologic exam.  What Can I Expect After the Test?  The technician will send the ultrasound images to your vascular surgeon for evaluation. Typically, a report is available in 2-3 days. If anything critical is found, it is standard practice to notify the vascular surgeon immediately.  Reference: https://vascular.org/patient-resources/vascular-tests

## 2024-01-05 NOTE — PROGRESS NOTES
[facility]    Patient is here for a 3 month follow up  to discuss PAD. RLE angiogram 8/1/23. Pt states that she is having no issues with her legs.     Pt is currently taking Aspirin and Statin, Plavix    The provider has been notified that the patient has no concerns.     Questions patient would like addressed today are: N/A.    Refills are needed: No    Has homecare services and agency name:  No

## 2024-01-11 ENCOUNTER — ANCILLARY PROCEDURE (OUTPATIENT)
Dept: VASCULAR ULTRASOUND | Facility: CLINIC | Age: 79
End: 2024-01-11
Attending: PHYSICIAN ASSISTANT
Payer: COMMERCIAL

## 2024-01-11 ENCOUNTER — OFFICE VISIT (OUTPATIENT)
Dept: VASCULAR SURGERY | Facility: CLINIC | Age: 79
End: 2024-01-11
Attending: PHYSICIAN ASSISTANT
Payer: COMMERCIAL

## 2024-01-11 VITALS — HEART RATE: 59 BPM | SYSTOLIC BLOOD PRESSURE: 138 MMHG | DIASTOLIC BLOOD PRESSURE: 66 MMHG | OXYGEN SATURATION: 100 %

## 2024-01-11 DIAGNOSIS — I73.9 PAD (PERIPHERAL ARTERY DISEASE) (H): Primary | ICD-10-CM

## 2024-01-11 DIAGNOSIS — I70.211 ATHEROSCLEROSIS OF NATIVE ARTERIES OF EXTREMITIES WITH INTERMITTENT CLAUDICATION, RIGHT LEG (H): ICD-10-CM

## 2024-01-11 DIAGNOSIS — I73.9 PAD (PERIPHERAL ARTERY DISEASE) (H): ICD-10-CM

## 2024-01-11 PROCEDURE — G0463 HOSPITAL OUTPT CLINIC VISIT: HCPCS | Mod: 25 | Performed by: PHYSICIAN ASSISTANT

## 2024-01-11 PROCEDURE — 93926 LOWER EXTREMITY STUDY: CPT | Mod: 26 | Performed by: SURGERY

## 2024-01-11 PROCEDURE — 93923 UPR/LXTR ART STDY 3+ LVLS: CPT | Mod: 26 | Performed by: SURGERY

## 2024-01-11 PROCEDURE — 99214 OFFICE O/P EST MOD 30 MIN: CPT | Performed by: PHYSICIAN ASSISTANT

## 2024-01-11 PROCEDURE — 93926 LOWER EXTREMITY STUDY: CPT | Mod: RT

## 2024-01-11 PROCEDURE — 93923 UPR/LXTR ART STDY 3+ LVLS: CPT
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
## 2024-03-14 ENCOUNTER — OFFICE VISIT (OUTPATIENT)
Dept: INTERNAL MEDICINE | Facility: CLINIC | Age: 79
End: 2024-03-14
Payer: COMMERCIAL

## 2024-03-14 VITALS
RESPIRATION RATE: 20 BRPM | TEMPERATURE: 97.4 F | DIASTOLIC BLOOD PRESSURE: 60 MMHG | SYSTOLIC BLOOD PRESSURE: 136 MMHG | HEART RATE: 72 BPM | OXYGEN SATURATION: 92 %

## 2024-03-14 DIAGNOSIS — R74.8 ELEVATED ALKALINE PHOSPHATASE MEASUREMENT: ICD-10-CM

## 2024-03-14 DIAGNOSIS — M85.851 OSTEOPENIA OF BOTH HIPS: ICD-10-CM

## 2024-03-14 DIAGNOSIS — Z29.11 NEED FOR VACCINATION AGAINST RESPIRATORY SYNCYTIAL VIRUS: ICD-10-CM

## 2024-03-14 DIAGNOSIS — F06.70 MILD VASCULAR NEUROCOGNITIVE DISORDER: ICD-10-CM

## 2024-03-14 DIAGNOSIS — I72.0 ANEURYSM OF CAROTID ARTERY (H): ICD-10-CM

## 2024-03-14 DIAGNOSIS — I99.9 MILD VASCULAR NEUROCOGNITIVE DISORDER: ICD-10-CM

## 2024-03-14 DIAGNOSIS — Z23 NEED FOR SHINGLES VACCINE: ICD-10-CM

## 2024-03-14 DIAGNOSIS — Z87.891 FORMER TOBACCO USE: ICD-10-CM

## 2024-03-14 DIAGNOSIS — M85.852 OSTEOPENIA OF BOTH HIPS: ICD-10-CM

## 2024-03-14 DIAGNOSIS — Z00.00 ENCOUNTER FOR MEDICARE ANNUAL WELLNESS EXAM: Primary | ICD-10-CM

## 2024-03-14 DIAGNOSIS — I73.9 PAD (PERIPHERAL ARTERY DISEASE) (H): ICD-10-CM

## 2024-03-14 DIAGNOSIS — G40.909 NONINTRACTABLE EPILEPSY WITHOUT STATUS EPILEPTICUS, UNSPECIFIED EPILEPSY TYPE (H): ICD-10-CM

## 2024-03-14 DIAGNOSIS — E78.2 MIXED HYPERLIPIDEMIA: ICD-10-CM

## 2024-03-14 DIAGNOSIS — I10 ESSENTIAL HYPERTENSION: ICD-10-CM

## 2024-03-14 DIAGNOSIS — I67.1 CEREBRAL ANEURYSM, NONRUPTURED: ICD-10-CM

## 2024-03-14 DIAGNOSIS — R06.83 SNORING: ICD-10-CM

## 2024-03-14 LAB
ERYTHROCYTE [DISTWIDTH] IN BLOOD BY AUTOMATED COUNT: 12.1 % (ref 10–15)
HCT VFR BLD AUTO: 42.6 % (ref 35–47)
HGB BLD-MCNC: 13.6 G/DL (ref 11.7–15.7)
MCH RBC QN AUTO: 32.1 PG (ref 26.5–33)
MCHC RBC AUTO-ENTMCNC: 31.9 G/DL (ref 31.5–36.5)
MCV RBC AUTO: 101 FL (ref 78–100)
PLATELET # BLD AUTO: 214 10E3/UL (ref 150–450)
RBC # BLD AUTO: 4.24 10E6/UL (ref 3.8–5.2)
WBC # BLD AUTO: 6.7 10E3/UL (ref 4–11)

## 2024-03-14 PROCEDURE — 80076 HEPATIC FUNCTION PANEL: CPT | Performed by: NURSE PRACTITIONER

## 2024-03-14 PROCEDURE — 80185 ASSAY OF PHENYTOIN TOTAL: CPT | Performed by: NURSE PRACTITIONER

## 2024-03-14 PROCEDURE — 99214 OFFICE O/P EST MOD 30 MIN: CPT | Mod: 25 | Performed by: NURSE PRACTITIONER

## 2024-03-14 PROCEDURE — 36415 COLL VENOUS BLD VENIPUNCTURE: CPT | Performed by: NURSE PRACTITIONER

## 2024-03-14 PROCEDURE — 91320 SARSCV2 VAC 30MCG TRS-SUC IM: CPT | Performed by: NURSE PRACTITIONER

## 2024-03-14 PROCEDURE — G0438 PPPS, INITIAL VISIT: HCPCS | Performed by: NURSE PRACTITIONER

## 2024-03-14 PROCEDURE — 90480 ADMN SARSCOV2 VAC 1/ONLY CMP: CPT | Performed by: NURSE PRACTITIONER

## 2024-03-14 PROCEDURE — 85027 COMPLETE CBC AUTOMATED: CPT | Performed by: NURSE PRACTITIONER

## 2024-03-14 RX ORDER — HYDROCHLOROTHIAZIDE 25 MG/1
25 TABLET ORAL DAILY
Qty: 90 TABLET | Refills: 1 | Status: SHIPPED | OUTPATIENT
Start: 2024-03-14 | End: 2024-09-10

## 2024-03-14 RX ORDER — AMLODIPINE BESYLATE 10 MG/1
10 TABLET ORAL DAILY
Qty: 90 TABLET | Refills: 1 | Status: SHIPPED | OUTPATIENT
Start: 2024-03-14 | End: 2024-09-10

## 2024-03-14 RX ORDER — RESPIRATORY SYNCYTIAL VIRUS VACCINE 120MCG/0.5
0.5 KIT INTRAMUSCULAR ONCE
Qty: 1 EACH | Refills: 0 | Status: CANCELLED | OUTPATIENT
Start: 2024-03-14 | End: 2024-03-14

## 2024-03-14 SDOH — HEALTH STABILITY: PHYSICAL HEALTH: ON AVERAGE, HOW MANY DAYS PER WEEK DO YOU ENGAGE IN MODERATE TO STRENUOUS EXERCISE (LIKE A BRISK WALK)?: 3 DAYS

## 2024-03-14 SDOH — HEALTH STABILITY: PHYSICAL HEALTH: ON AVERAGE, HOW MANY MINUTES DO YOU ENGAGE IN EXERCISE AT THIS LEVEL?: 60 MIN

## 2024-03-14 ASSESSMENT — SOCIAL DETERMINANTS OF HEALTH (SDOH): HOW OFTEN DO YOU GET TOGETHER WITH FRIENDS OR RELATIVES?: MORE THAN THREE TIMES A WEEK

## 2024-03-14 NOTE — PROGRESS NOTES
Preventive Care Visit  [facility]  Luz Cedillo NP  Mar 14, 2024    Assessment & Plan   Problem List Items Addressed This Visit       Hyperlipidemia     LDL at goal, 59  Continue statin          Epilepsy And Recurrent Seizures     Stable with phenytoin.  Phenytoin level today.         Relevant Orders    CBC with Platelets (Completed)    Phenytoin level    Elevated alkaline phosphatase measurement     Repeat hepatic profile          Relevant Orders    Hepatic panel (Albumin, ALT, AST, Bili, Alk Phos, TP)    Osteopenia- Reclast 10/17/19 restarted     Repeat reclast infusion 5/2024         Mild vascular neurocognitive disorder     Mild impairments, forgetful. Managing well independently          Cerebral aneurysm, nonruptured- last imaging stable 4/2021     Follow up imaging ordered to monitor for stability          Relevant Orders    CTA Head with Contrast    Former tobacco use, quit 11/2023     She is congratulated on tobacco cessation!          Aneurysm of carotid artery (H24)     Follow up imaging ordered          Essential hypertension     Normotensive         Relevant Medications    amLODIPine (NORVASC) 10 MG tablet    hydrochlorothiazide (HYDRODIURIL) 25 MG tablet    PAD (peripheral artery disease) (H24)     Continue statin, aspirin  Strongly commended for tobacco cessation.          Other Visit Diagnoses       Encounter for Medicare annual wellness exam    -  Primary    Need for shingles vaccine        Need for vaccination against respiratory syncytial virus        Snoring        Relevant Orders    Adult Sleep Eval & Management  Referral        - RSV and shingles vaccine recommended through the pharmacy       Counseling  Appropriate preventive services were discussed with this patient, including applicable screening as appropriate for fall prevention, nutrition, physical activity, Tobacco-use cessation, weight loss and cognition.  Checklist reviewing preventive services available  has been given to the patient.  Reviewed patient's diet, addressing concerns and/or questions.   She is at risk for lack of exercise and has been provided with information to increase physical activity for the benefit of her well-being.   The patient was instructed to see the dentist every 6 months.           Subjective   Kimberly Rodriguez is a 78 year old, presenting for the following:  Wellness Visit and Follow Up        3/14/2024     3:00 PM   Additional Questions   Roomed by Rishabh ROSALES CMA   Accompanied by Boyfriend       Health Care Directive  Patient does not have a Health Care Directive or Living Will: Patient states has Advance Directive and will bring in a copy to clinic.    HPI  Kimberly Hernandez is here for an AWV.     Her boyfriend is here today, he reports that she snores loudly and he has witnessed apneic episodes. She denies daytime fatigue.         3/14/2024   General Health   How would you rate your overall physical health? Good   Feel stress (tense, anxious, or unable to sleep) To some extent   (!) STRESS CONCERN      3/14/2024   Nutrition   Diet: Regular (no restrictions)         3/14/2024   Exercise   Days per week of moderate/strenous exercise 3 days   Average minutes spent exercising at this level 60 min         3/14/2024   Social Factors   Frequency of gathering with friends or relatives More than three times a week   Worry food won't last until get money to buy more No   Food not last or not have enough money for food? No   Do you have housing?  Yes   Are you worried about losing your housing? No   Lack of transportation? No   Unable to get utilities (heat,electricity)? No         3/14/2024   Activities of Daily Living- Home Safety   Needs help with the following daily activites None of the above   Safety concerns in the home None of the above         3/14/2024   Dental   Dentist two times every year? (!) NO         3/14/2024   Hearing Screening   Hearing concerns? None of the above         3/14/2024    Driving Risk Screening   Patient/family members have concerns about driving No         3/14/2024   General Alertness/Fatigue Screening   Have you been more tired than usual lately? No         3/14/2024   Urinary Incontinence Screening   Bothered by leaking urine in past 6 months No         3/14/2024   TB Screening   Were you born outside of [place]?  No         Today's PHQ-2 Score:       3/14/2024     3:15 PM   PHQ-2 (  [employer])   Q1: Little interest or pleasure in doing things 0   Q2: Feeling down, depressed or hopeless 0   PHQ-2 Score 0   Q1: Little interest or pleasure in doing things Not at all   Q2: Feeling down, depressed or hopeless Not at all   PHQ-2 Score 0           3/14/2024   Substance Use   If I could quit smoking, I would Completely agree   I want to quit somking, worry about health affects Completely agree   Willing to make a plan to quit smoking Completely agree   Willing to cut down before quitting Completely agree   Alcohol more than 3/day or more than 7/wk No   Do you have a current opioid prescription? No   How severe/bad is pain from 1 to 10? 0/10 (No Pain)   Do you use any other substances recreationally? No     Social History     Tobacco Use    Smoking status: Former     Packs/day: 0.50     Years: 50.00     Additional pack years: 0.00     Total pack years: 25.00     Types: Cigarettes     Quit date: 11/10/2023     Years since quittin.3    Smokeless tobacco: Never   Vaping Use    Vaping Use: Never used   Substance Use Topics    Alcohol use: Not Currently     Comment: Alcoholic Drinks/day: recovered alcoholic x 32 years     Drug use: No     ______________________________________________________________________    STOPBANG KARINA ASSESSMENT    1.  Do you snore loudly (louder than talking or loud enough to be heard through closed doors):  yes    2.  Do you often feel tired, fatigued, or sleepy during the day:  no    3.  Has anyone observed you stop breathing during sleep:  yes    4.  Do you have  or are you being treated for high blood pressure:  yes    5.  Body mass index > 35:  There is no height or weight on file to calculate BMI.    6.  Age > 50:  78 year old     7.  Neck circumference greater than 40 cm:  no    8.  Gender male:  female     Total score:  4    A score of 3 or greater indicates a risk for sleep apnea (84% sensitivity).  A score of 5 or greater is more predictive of clinically relevant moderate to severe obstructive sleep apnea.    ______________________________________________________________________      ASCVD Risk   The ASCVD Risk score (Cecile LEMUS, et al., 2019) failed to calculate for the following reasons:    The patient has a prior MI or stroke diagnosis        Reviewed and updated as needed this visit by Provider   Tobacco  Allergies  Meds  Problems  Med Hx  Surg Hx  Fam Hx            Current providers sharing in care for this patient include:  Patient Care Team:  Luz Cedillo NP as PCP - General  Luz Cedillo NP as Assigned PCP  Lydia Saleem PA-C as Assigned Heart and Vascular Provider    The following health maintenance items are reviewed in Epic and correct as of today:  Health Maintenance   Topic Date Due    HF ACTION PLAN  Never done    ANNUAL REVIEW OF HM ORDERS  Never done    ADVANCE CARE PLANNING  Never done    RSV VACCINE (Pregnancy & 60+) (1 - 1-dose 60+ series) Never done    ZOSTER IMMUNIZATION (2 of 3) 09/02/2010    BMP  04/17/2024    DTAP/TDAP/TD IMMUNIZATION (2 - Td or Tdap) 07/15/2024    ALT  10/17/2024    LIPID  10/17/2024    LUNG CANCER SCREENING  10/23/2024    MEDICARE ANNUAL WELLNESS VISIT  03/14/2025    FALL RISK ASSESSMENT  03/14/2025    CBC  03/14/2025    GLUCOSE  10/17/2026    DEXA  04/26/2038    TSH W/FREE T4 REFLEX  Completed    HEPATITIS C SCREENING  Completed    PHQ-2 (once per calendar year)  Completed    INFLUENZA VACCINE  Completed    Pneumococcal Vaccine: 65+ Years  Completed    COVID-19 Vaccine  Completed    IPV IMMUNIZATION   "Aged Out    HPV IMMUNIZATION  Aged Out    MENINGITIS IMMUNIZATION  Aged Out    RSV MONOCLONAL ANTIBODY  Aged Out    MAMMO SCREENING  Discontinued          Objective    Exam  /60 (BP Location: Right arm, Patient Position: Sitting, Cuff Size: Adult Regular)   Pulse 72   Temp 97.4  F (36.3  C) (Oral)   Resp 20   LMP  (LMP Unknown)   SpO2 92%    Estimated body mass index is 23.21 kg/m  as calculated from the following:    Height as of 8/28/23: 1.6 m (5' 3\").    Weight as of 10/17/23: 59.4 kg (131 lb).    Wt Readings from Last 5 Encounters:   10/17/23 59.4 kg (131 lb)   08/28/23 53.5 kg (118 lb)   06/19/23 57.7 kg (127 lb 4.8 oz)   05/04/23 55.9 kg (123 lb 4.8 oz)   05/02/23 55.8 kg (123 lb)       Physical Exam  GENERAL: alert and no distress  RESP: lungs clear to auscultation - no rales, rhonchi or wheezes  CV: regular rate and rhythm, normal S1 S2, no S3 or S4, no murmur, click or rub, no peripheral edema        3/14/2024   Mini Cog   Clock Draw Score 2 Normal   3 Item Recall 2 objects recalled   Mini Cog Total Score 4          Signed Electronically by: Luz Cedillo NP    "

## 2024-03-14 NOTE — LETTER
March 18, 2024      Kimberly Hernandez  [address] [id number]        Dear ,    We are writing to inform you of your test results.    Your test results fall within the expected range(s) or remain unchanged from previous results.  Please continue with current treatment plan.    Resulted Orders   CBC with Platelets   Result Value Ref Range    WBC Count 6.7 4.0 - 11.0 10e3/uL    RBC Count 4.24 3.80 - 5.20 10e6/uL    Hemoglobin 13.6 11.7 - 15.7 g/dL    Hematocrit 42.6 35.0 - 47.0 %     (H) 78 - 100 fL    MCH 32.1 26.5 - 33.0 pg    MCHC 31.9 31.5 - 36.5 g/dL    RDW 12.1 10.0 - 15.0 %    Platelet Count 214 150 - 450 10e3/uL   Phenytoin level   Result Value Ref Range    Phenytoin 19.3   ug/mL      Comment:      Therapeutic Range: 10.0-20.0 ug/mL  Critical: Greater than 30.0 ug/mL   Hepatic panel (Albumin, ALT, AST, Bili, Alk Phos, TP)   Result Value Ref Range    Protein Total 7.1 6.4 - 8.3 g/dL    Albumin 4.8 3.5 - 5.2 g/dL    Bilirubin Total <0.2 <=1.2 mg/dL    Alkaline Phosphatase 132 40 - 150 U/L      Comment:      Reference intervals for this test were updated on 11/14/2023 to more accurately reflect our healthy population. There may be differences in the flagging of prior results with similar values performed with this method. Interpretation of those prior results can be made in the context of the updated reference intervals.    AST 23 0 - 45 U/L      Comment:      Reference intervals for this test were updated on 6/12/2023 to more accurately reflect our healthy population. There may be differences in the flagging of prior results with similar values performed with this method. Interpretation of those prior results can be made in the context of the updated reference intervals.    ALT 20 0 - 50 U/L      Comment:      Reference intervals for this test were updated on 6/12/2023 to more accurately reflect our healthy population. There may be differences in the flagging of prior results  with similar values performed with this method. Interpretation of those prior results can be made in the context of the updated reference intervals.      Bilirubin Direct <0.20 0.00 - 0.30 mg/dL       If you have any questions or concerns, please call the clinic at the number listed above.       Sincerely,      Luz Cedillo NP

## 2024-03-14 NOTE — PATIENT INSTRUCTIONS
Preventive Care Advice   I recommend RSV and shingles vaccines through the pharmacy   This is general advice given by our system to help you stay healthy. However, your care team may have specific advice just for you. Please talk to your care team about your preventive care needs.  Nutrition  Eat 5 or more servings of fruits and vegetables each day.  Try wheat bread, brown rice and whole grain pasta (instead of white bread, rice, and pasta).  Get enough calcium and vitamin D. Check the label on foods and aim for 100% of the RDA (recommended daily allowance).  Lifestyle  Exercise at least 150 minutes each week   (30 minutes a day, 5 days a week).  Do muscle strengthening activities 2 days a week. These help control your weight and prevent disease.  No smoking.  Wear sunscreen to prevent skin cancer.  Have a dental exam and cleaning every 6 months.  Yearly exams  See your health care team every year to talk about:  Any changes in your health.  Any medicines your care team has prescribed.  Preventive care, family planning, and ways to prevent chronic diseases.  Shots (vaccines)   HPV shots (up to age 26), if you've never had them before.  Hepatitis B shots (up to age 59), if you've never had them before.  COVID-19 shot: Get this shot when it's due.  Flu shot: Get a flu shot every year.  Tetanus shot: Get a tetanus shot every 10 years.  Pneumococcal, hepatitis A, and RSV shots: Ask your care team if you need these based on your risk.  Shingles shot (for age 50 and up).  General health tests  Diabetes screening:  Starting at age 35, Get screened for diabetes at least every 3 years.  If you are younger than age 35, ask your care team if you should be screened for diabetes.  Cholesterol test: At age 39, start having a cholesterol test every 5 years, or more often if advised.  Bone density scan (DEXA): At age 50, ask your care team if you should have this scan for osteoporosis (brittle bones).  Hepatitis C: Get tested at  least once in your life.  STIs (sexually transmitted infections)  Before age 24: Ask your care team if you should be screened for STIs.  After age 24: Get screened for STIs if you're at risk. You are at risk for STIs (including HIV) if:  You are sexually active with more than one person.  You don't use condoms every time.  You or a partner was diagnosed with a sexually transmitted infection.  If you are at risk for HIV, ask about PrEP medicine to prevent HIV.  Get tested for HIV at least once in your life, whether you are at risk for HIV or not.  Cancer screening tests  Cervical cancer screening: If you have a cervix, begin getting regular cervical cancer screening tests at age 21. Most people who have regular screenings with normal results can stop after age 65. Talk about this with your provider.  Breast cancer scan (mammogram): If you've ever had breasts, begin having regular mammograms starting at age 40. This is a scan to check for breast cancer.  Colon cancer screening: It is important to start screening for colon cancer at age 45.  Have a colonoscopy test every 10 years (or more often if you're at risk) Or, ask your provider about stool tests like a FIT test every year or Cologuard test every 3 years.  To learn more about your testing options, visit: https://www.[employer]/203736.pdf.  For help making a decision, visit: https://bit.ly/dy01538.  Prostate cancer screening test: If you have a prostate and are age 55 to 69, ask your provider if you would benefit from a yearly prostate cancer screening test.  Lung cancer screening: If you are a current or former smoker age 50 to 80, ask your care team if ongoing lung cancer screenings are right for you.  For informational purposes only. Not to replace the advice of your health care provider. Copyright   2023 [employer]. All rights reserved. Clinically reviewed by the [facility] Transitions Program. [employer] 594264 - REV 01/24.    Learning  About Stress  What is stress?     Stress is your body's response to a hard situation. Your body can have a physical, emotional, or mental response. Stress is a fact of life for most people, and it affects everyone differently. What causes stress for you may not be stressful for someone else.  A lot of things can cause stress. You may feel stress when you go on a job interview, take a test, or run a race. This kind of short-term stress is normal and even useful. It can help you if you need to work hard or react quickly. For example, stress can help you finish an important job on time.  Long-term stress is caused by ongoing stressful situations or events. Examples of long-term stress include long-term health problems, ongoing problems at work, or conflicts in your family. Long-term stress can harm your health.  How does stress affect your health?  When you are stressed, your body responds as though you are in danger. It makes hormones that speed up your heart, make you breathe faster, and give you a burst of energy. This is called the fight-or-flight stress response. If the stress is over quickly, your body goes back to normal and no harm is done.  But if stress happens too often or lasts too long, it can have bad effects. Long-term stress can make you more likely to get sick, and it can make symptoms of some diseases worse. If you tense up when you are stressed, you may develop neck, shoulder, or low back pain. Stress is linked to high blood pressure and heart disease.  Stress also harms your emotional health. It can make you marvin, tense, or depressed. Your relationships may suffer, and you may not do well at work or school.  What can you do to manage stress?  You can try these things to help manage stress:   Do something active. Exercise or activity can help reduce stress. Walking is a great way to get started. Even everyday activities such as housecleaning or yard work can help.  Try yoga or nate chi. These  techniques combine exercise and meditation. You may need some training at first to learn them.  Do something you enjoy. For example, listen to music or go to a movie. Practice your hobby or do volunteer work.  Meditate. This can help you relax, because you are not worrying about what happened before or what may happen in the future.  Do guided imagery. Imagine yourself in any setting that helps you feel calm. You can use online videos, books, or a teacher to guide you.  Do breathing exercises. For example:  From a standing position, bend forward from the waist with your knees slightly bent. Let your arms dangle close to the floor.  Breathe in slowly and deeply as you return to a standing position. Roll up slowly and lift your head last.  Hold your breath for just a few seconds in the standing position.  Breathe out slowly and bend forward from the waist.  Let your feelings out. Talk, laugh, cry, and express anger when you need to. Talking with supportive friends or family, a counselor, or a joanna leader about your feelings is a healthy way to relieve stress. Avoid discussing your feelings with people who make you feel worse.  Write. It may help to write about things that are bothering you. This helps you find out how much stress you feel and what is causing it. When you know this, you can find better ways to cope.  What can you do to prevent stress?  You might try some of these things to help prevent stress:  Manage your time. This helps you find time to do the things you want and need to do.  Get enough sleep. Your body recovers from the stresses of the day while you are sleeping.  Get support. Your family, friends, and community can make a difference in how you experience stress.  Limit your news feed. Avoid or limit time on social media or news that may make you feel stressed.  Do something active. Exercise or activity can help reduce stress. Walking is a great way to get started.  Where can you learn more?  Go  "to https://www.[employer].net/patiented  Enter N032 in the search box to learn more about \"Learning About Stress.\"  Current as of: October 24, 2023               Content Version: 14.0    3097-5776 [employer].   Care instructions adapted under license by your healthcare professional. If you have questions about a medical condition or this instruction, always ask your healthcare professional. [employer] disclaims any warranty or liability for your use of this information.      "

## 2024-03-15 ENCOUNTER — TELEPHONE (OUTPATIENT)
Dept: INTERNAL MEDICINE | Facility: CLINIC | Age: 79
End: 2024-03-15
Payer: COMMERCIAL

## 2024-03-15 PROBLEM — Z87.891 FORMER TOBACCO USE: Status: ACTIVE | Noted: 2020-03-10

## 2024-03-15 LAB
ALBUMIN SERPL BCG-MCNC: 4.8 G/DL (ref 3.5–5.2)
ALP SERPL-CCNC: 132 U/L (ref 40–150)
ALT SERPL W P-5'-P-CCNC: 20 U/L (ref 0–50)
AST SERPL W P-5'-P-CCNC: 23 U/L (ref 0–45)
BILIRUB DIRECT SERPL-MCNC: <0.2 MG/DL (ref 0–0.3)
BILIRUB SERPL-MCNC: <0.2 MG/DL
PHENYTOIN SERPL-MCNC: 19.3 UG/ML
PROT SERPL-MCNC: 7.1 G/DL (ref 6.4–8.3)

## 2024-03-15 NOTE — TELEPHONE ENCOUNTER
Please let patient know that I forgot to mention a follow up scan for carotid aneurysm monitoring when she was here for her appointment. I'll order a head CTA and she should be contacted by radiology to assist with scheduling.

## 2024-03-19 NOTE — TELEPHONE ENCOUNTER
Called pt and relayed PCP's message below. Writer provided pt with imaging scheduling.     Pt thanked for the call and had no further questions.

## 2024-03-28 ENCOUNTER — OFFICE VISIT (OUTPATIENT)
Dept: INTERNAL MEDICINE | Facility: CLINIC | Age: 79
End: 2024-03-28
Payer: COMMERCIAL

## 2024-03-28 VITALS
RESPIRATION RATE: 16 BRPM | DIASTOLIC BLOOD PRESSURE: 62 MMHG | OXYGEN SATURATION: 98 % | HEART RATE: 74 BPM | HEIGHT: 63 IN | BODY MASS INDEX: 25.18 KG/M2 | SYSTOLIC BLOOD PRESSURE: 138 MMHG | WEIGHT: 142.1 LBS | TEMPERATURE: 97.9 F

## 2024-03-28 DIAGNOSIS — B35.1 ONYCHOMYCOSIS: Primary | ICD-10-CM

## 2024-03-28 PROCEDURE — 99213 OFFICE O/P EST LOW 20 MIN: CPT | Performed by: NURSE PRACTITIONER

## 2024-03-28 RX ORDER — TERBINAFINE HYDROCHLORIDE 250 MG/1
250 TABLET ORAL DAILY
Qty: 90 TABLET | Refills: 0 | Status: SHIPPED | OUTPATIENT
Start: 2024-03-28 | End: 2024-06-26

## 2024-03-28 RX ORDER — RESPIRATORY SYNCYTIAL VIRUS VACCINE 120MCG/0.5
0.5 KIT INTRAMUSCULAR ONCE
Qty: 1 EACH | Refills: 0 | Status: CANCELLED | OUTPATIENT
Start: 2024-03-28 | End: 2024-03-28

## 2024-03-28 NOTE — PROGRESS NOTES
"  Assessment & Plan   Problem List Items Addressed This Visit    None  Visit Diagnoses       Onychomycosis    -  Primary    Relevant Medications    terbinafine (LAMISIL) 250 MG tablet    Other Relevant Orders    Orthopedic  Referral    Hepatic panel (Albumin, ALT, AST, Bili, Alk Phos, TP)           - Referral to podiatry placed because the nail causes her discomfort because it is hypertrophied and she has an upcoming trip to [place] planned in just a few weeks. Also reviewed the option for treatment with terbinafine once she returns from [place]. She is advised that if she starts the medication, she should return for monthly hepatic profiles until medication is completed          BMI  Estimated body mass index is 25.17 kg/m  as calculated from the following:    Height as of this encounter: 1.6 m (5' 3\").    Weight as of this encounter: 64.5 kg (142 lb 1.6 oz).         Linh Harris is a 78 year old, presenting for the following health issues:  Sore Toe        3/28/2024     3:46 PM   Additional Questions   Roomed by Aleshia WINN     History of Present Illness       Reason for visit:  Sore toe    She eats 2-3 servings of fruits and vegetables daily.She consumes 0 sweetened beverage(s) daily.She exercises with enough effort to increase her heart rate 20 to 29 minutes per day.  She exercises with enough effort to increase her heart rate 3 or less days per week.   She is taking medications regularly.       Concern - Sore Left Great Toe  Onset: About a month ago  Description: sore and painful  Intensity: moderate  Progression of Symptoms:  worsening  Accompanying Signs & Symptoms: No  Previous history of similar problem: No  Precipitating factors:        Worsened by: Shoes and walking  Alleviating factors:        Improved by: No  Therapies tried and outcome: Soaking feet in warm water with epsom salt.          Objective    /62   Pulse 74   Temp 97.9  F (36.6  C) (Oral)   Resp 16   Ht 1.6 m (5' 3\")   Wt " 64.5 kg (142 lb 1.6 oz)   LMP  (LMP Unknown)   SpO2 98%   BMI 25.17 kg/m    Body mass index is 25.17 kg/m .  Physical Exam   GENERAL: alert and no distress  SKIN: left great toenail is hypertrophic and yellow in color           Signed Electronically by: Luz Cedillo NP

## 2024-04-03 ENCOUNTER — HOSPITAL ENCOUNTER (OUTPATIENT)
Dept: CT IMAGING | Facility: HOSPITAL | Age: 79
Discharge: HOME | End: 2024-04-03
Attending: NURSE PRACTITIONER | Admitting: NURSE PRACTITIONER
Payer: COMMERCIAL

## 2024-04-03 LAB
CREAT BLD-MCNC: 1.2 MG/DL (ref 0.6–1.1)
EGFRCR SERPLBLD CKD-EPI 2021: 46 ML/MIN/1.73M2

## 2024-04-03 PROCEDURE — 70496 CT ANGIOGRAPHY HEAD: CPT

## 2024-04-03 PROCEDURE — 82565 ASSAY OF CREATININE: CPT

## 2024-04-03 PROCEDURE — 250N000011 HC RX IP 250 OP 636: Performed by: NURSE PRACTITIONER

## 2024-04-03 RX ORDER — IOPAMIDOL 755 MG/ML
75 INJECTION, SOLUTION INTRAVASCULAR ONCE
Status: COMPLETED | OUTPATIENT
Start: 2024-04-03 | End: 2024-04-03

## 2024-04-03 RX ADMIN — IOPAMIDOL 75 ML: 755 INJECTION, SOLUTION INTRAVENOUS at 12:55

## 2024-04-11 ENCOUNTER — TELEPHONE (OUTPATIENT)
Dept: VASCULAR SURGERY | Facility: CLINIC | Age: 79
End: 2024-04-11
Payer: COMMERCIAL

## 2024-04-11 NOTE — TELEPHONE ENCOUNTER
LMTCB schedule 6 month follow up due in July with Lydia + studies        Received: 3 months ago  Pavithra Gómez, RN  [facility] Scheduling Registration Pool  Please call to schedule 6 months with Lydia with studies

## 2024-04-24 ENCOUNTER — TELEPHONE (OUTPATIENT)
Dept: SLEEP MEDICINE | Facility: CLINIC | Age: 79
End: 2024-04-24

## 2024-05-10 ENCOUNTER — DOCUMENTATION ONLY (OUTPATIENT)
Dept: NEUROLOGY | Facility: CLINIC | Age: 79
End: 2024-05-10

## 2024-05-10 NOTE — PROGRESS NOTES
NEUROPSYCHOLOGY NOTE    Ms. Hernandez did not present for today's scheduled Neuropsychological Evaluation. If she would like to re-schedule, she can do so by calling [phone].    Leela Rios, PhD, LP, ABPP  Clinical Neuropsychologist

## 2024-05-24 ENCOUNTER — RESULT FOLLOW UP (OUTPATIENT)
Dept: NEUROLOGY | Facility: CLINIC | Age: 79
End: 2024-05-24
Payer: COMMERCIAL

## 2024-05-24 DIAGNOSIS — I72.0 ANEURYSM OF CAROTID ARTERY (H): Primary | ICD-10-CM

## 2024-05-24 NOTE — PROGRESS NOTES
Kimberly completed a CTA on 4/3/2024 (ordered by Luz Cedillo NP) as part of serial imaging of aneurysms both treated and untreated. Dr. Alcon Herrera reviewed the imaging and determines aneurysms to be stable. It is necessary to continue monitoring the aneurysm therefore  recommends the next MRA in 3 years. To facilitate this, we will call patient to arrange imaging in April, 2027.      David Erickson RN  [facility]  [phone]  Fax [phone]

## 2024-05-29 PROBLEM — I67.1 CEREBRAL ANEURYSM, NONRUPTURED: Status: ACTIVE | Noted: 2018-12-14

## 2024-06-12 DIAGNOSIS — G40.909 NONINTRACTABLE EPILEPSY WITHOUT STATUS EPILEPTICUS, UNSPECIFIED EPILEPSY TYPE (H): ICD-10-CM

## 2024-06-13 RX ORDER — PHENYTOIN SODIUM 100 MG/1
CAPSULE, EXTENDED RELEASE ORAL
Qty: 270 CAPSULE | Refills: 1 | Status: SHIPPED | OUTPATIENT
Start: 2024-06-13

## 2024-07-11 ENCOUNTER — HOSPITAL ENCOUNTER (OUTPATIENT)
Dept: GENERAL RADIOLOGY | Facility: HOSPITAL | Age: 79
Discharge: HOME | End: 2024-07-11
Attending: PHYSICIAN ASSISTANT
Payer: COMMERCIAL

## 2024-07-11 ENCOUNTER — OFFICE VISIT (OUTPATIENT)
Dept: FAMILY MEDICINE | Facility: CLINIC | Age: 79
End: 2024-07-11
Payer: COMMERCIAL

## 2024-07-11 VITALS
RESPIRATION RATE: 16 BRPM | SYSTOLIC BLOOD PRESSURE: 146 MMHG | HEART RATE: 70 BPM | OXYGEN SATURATION: 98 % | TEMPERATURE: 98 F | DIASTOLIC BLOOD PRESSURE: 66 MMHG

## 2024-07-11 DIAGNOSIS — R07.89 CHEST WALL PAIN: ICD-10-CM

## 2024-07-11 DIAGNOSIS — S29.9XXA INJURY OF STERNUM, INITIAL ENCOUNTER: Primary | ICD-10-CM

## 2024-07-11 PROCEDURE — 71046 X-RAY EXAM CHEST 2 VIEWS: CPT

## 2024-07-11 PROCEDURE — 99214 OFFICE O/P EST MOD 30 MIN: CPT | Performed by: PHYSICIAN ASSISTANT

## 2024-07-11 PROCEDURE — 71120 X-RAY EXAM BREASTBONE 2/>VWS: CPT

## 2024-07-11 RX ORDER — HYDROCODONE BITARTRATE AND ACETAMINOPHEN 5; 325 MG/1; MG/1
1 TABLET ORAL AT BEDTIME
Qty: 10 TABLET | Refills: 0 | Status: SHIPPED | OUTPATIENT
Start: 2024-07-11 | End: 2024-07-21
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
## 2024-07-11 NOTE — PATIENT INSTRUCTIONS
(S29.9XXA) Injury of sternum, initial encounter  (primary encounter diagnosis)  Comment:   Plan: HYDROcodone-acetaminophen (NORCO) 5-325 MG         tablet            (R07.89) Chest wall pain  Comment:   Plan: XR Sternum 2 Views, XR Chest 2 Views            Set up appointment with primary doctor for about 2 weeks from now for re-check.  Seek evaluation in the ER sooner should symptoms worsen in ANY way.

## 2024-07-17 ENCOUNTER — OFFICE VISIT (OUTPATIENT)
Dept: INTERNAL MEDICINE | Facility: CLINIC | Age: 79
End: 2024-07-17
Payer: COMMERCIAL

## 2024-07-17 VITALS
HEIGHT: 63 IN | OXYGEN SATURATION: 96 % | WEIGHT: 135.2 LBS | DIASTOLIC BLOOD PRESSURE: 58 MMHG | TEMPERATURE: 97.6 F | SYSTOLIC BLOOD PRESSURE: 128 MMHG | HEART RATE: 73 BPM | BODY MASS INDEX: 23.96 KG/M2 | RESPIRATION RATE: 16 BRPM

## 2024-07-17 DIAGNOSIS — S22.22XA FRACTURE OF BODY OF STERNUM, INITIAL ENCOUNTER FOR CLOSED FRACTURE: Primary | ICD-10-CM
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
## 2024-07-17 PROCEDURE — 99213 OFFICE O/P EST LOW 20 MIN: CPT | Performed by: NURSE PRACTITIONER

## 2024-07-17 PROCEDURE — G2211 COMPLEX E/M VISIT ADD ON: HCPCS | Performed by: NURSE PRACTITIONER

## 2024-07-17 RX ORDER — CYCLOBENZAPRINE HCL 5 MG
5-10 TABLET ORAL 3 TIMES DAILY PRN
Qty: 20 TABLET | Refills: 1 | Status: SHIPPED | OUTPATIENT
Start: 2024-07-17

## 2024-07-17 ASSESSMENT — PAIN SCALES - GENERAL: PAINLEVEL: SEVERE PAIN (6)
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
## 2024-07-24 NOTE — PATIENT INSTRUCTIONS
Maribel Harris,    Thank you for entrusting your care with us today. After your visit today with BINTA Saleem this is the plan that was discussed at your appointment.    We will get you scheduled for CTA abdomen/pelvis and follow up with Dr. Butt afterwards.    We will call you to get this scheduled.      I am including additional information on these things and our contact information if you have any questions or concerns.   Please do not hesitate to reach out to us if you felt we did not answer your questions or you are unsure of the treatment plan after your visit today. Our number is [phone].Thank you for trusting us with your care.       Computed Tomography Angiography (CTA)  Computed tomography angiography (CTA) is an imaging test. It uses X-rays and computer technology to make detailed pictures of your arteries. Before the test, an X-ray dye (contrast medium) is injected into your vein. The dye makes it easier to see your blood vessels on the X-ray. Pictures are then taken with the CT scanner. A computer turns the images into 2-D and 3-D pictures.      CTA can make 3D images, such as the carotid arteries shown here.   Why CTA is done  CTA may be used to:  Check arteries in your belly, neck, lungs, pelvis, kidneys, or brain.  Look for a ballooning of the blood vessel wall (aneurysm) or a tear (dissection).  Check if a tube (stent) used to keep an artery open is working well.  Find damage to your arteries due to injuries.  Collect details on blood vessels that supply blood to tumors.  Getting ready for your test  Tell your healthcare provider if you:   Have diabetes  Have kidney disease  Are allergic to X-ray dye or other medicines  Are pregnant or think you may be pregnant  Are taking any medicines, herbs, or supplements, including prescription medicines, illegal drugs, and over-the-counter medicines such as aspirin or ibuprofen    Follow any directions you are given for not eating or drinking  before the CTA. Follow any other instructions from your healthcare provider.   During your test  You will be asked to remove any hair clips, jewelry, false teeth, or other metal items that could show up on the X-ray.  You will lie down on the scanning table. An IV line will be put in a vein in your arm or hand.  The scanning table will be properly placed. The part of your body being checked will be inside the doughnut-shaped CT scanner.  One image may be taken first to be sure you are in the proper position for the test.  The IV will be hooked up to an automatic injection machine. This controls how often and how fast the X-ray dye is injected. The injection may continue during part of the exam.  The dye will be put into your vein through the IV line. You may feel warmth through your body when the dye is injected.  You can t move while the X-rays are being taken. Pillows and foam pads may be used to help you stay still. You will be told to hold your breath for 10 to 25 seconds at a time.  A single scan may take several minutes. You may need more than one scan.     After your test  Drink plenty of fluids to help flush the X-ray dye from your body.  You may eat as soon as you want to.     Possible risks  All procedures have some risks. A CTA has some possible risks. These include:   Problems due to the X-ray dye, such as an allergic reaction or kidney damage  Skin damage from leaking X-ray dye near where the IV was put in  [employer] last reviewed this educational content on 4/1/2020 2000-2021 [employer]. All rights reserved. This information is not intended as a substitute for professional medical care. Always follow your healthcare professional's instructions.

## 2024-07-26 ENCOUNTER — OFFICE VISIT (OUTPATIENT)
Dept: SLEEP MEDICINE | Facility: CLINIC | Age: 79
End: 2024-07-26
Attending: NURSE PRACTITIONER
Payer: COMMERCIAL

## 2024-07-26 VITALS
BODY MASS INDEX: 23.92 KG/M2 | WEIGHT: 135 LBS | OXYGEN SATURATION: 98 % | SYSTOLIC BLOOD PRESSURE: 118 MMHG | HEART RATE: 85 BPM | HEIGHT: 63 IN | DIASTOLIC BLOOD PRESSURE: 65 MMHG

## 2024-07-26 DIAGNOSIS — G47.31 CSA (CENTRAL SLEEP APNEA): Primary | ICD-10-CM

## 2024-07-26 DIAGNOSIS — R06.83 SNORING: ICD-10-CM

## 2024-07-26 DIAGNOSIS — R06.3 CHEYNE-STOKES RESPIRATION: ICD-10-CM

## 2024-07-26 PROCEDURE — 99215 OFFICE O/P EST HI 40 MIN: CPT | Performed by: STUDENT IN AN ORGANIZED HEALTH CARE EDUCATION/TRAINING PROGRAM

## 2024-07-26 PROCEDURE — G2211 COMPLEX E/M VISIT ADD ON: HCPCS | Performed by: STUDENT IN AN ORGANIZED HEALTH CARE EDUCATION/TRAINING PROGRAM

## 2024-07-26 ASSESSMENT — SLEEP AND FATIGUE QUESTIONNAIRES
HOW LIKELY ARE YOU TO NOD OFF OR FALL ASLEEP WHILE SITTING QUIETLY AFTER LUNCH WITHOUT ALCOHOL: WOULD NEVER DOZE
HOW LIKELY ARE YOU TO NOD OFF OR FALL ASLEEP WHILE WATCHING TV: WOULD NEVER DOZE
HOW LIKELY ARE YOU TO NOD OFF OR FALL ASLEEP IN A CAR, WHILE STOPPED FOR A FEW MINUTES IN TRAFFIC: WOULD NEVER DOZE
HOW LIKELY ARE YOU TO NOD OFF OR FALL ASLEEP WHILE LYING DOWN TO REST IN THE AFTERNOON WHEN CIRCUMSTANCES PERMIT: WOULD NEVER DOZE
HOW LIKELY ARE YOU TO NOD OFF OR FALL ASLEEP WHEN YOU ARE A PASSENGER IN A CAR FOR AN HOUR WITHOUT A BREAK: SLIGHT CHANCE OF DOZING
HOW LIKELY ARE YOU TO NOD OFF OR FALL ASLEEP WHILE SITTING INACTIVE IN A PUBLIC PLACE: WOULD NEVER DOZE
HOW LIKELY ARE YOU TO NOD OFF OR FALL ASLEEP WHILE SITTING AND TALKING TO SOMEONE: WOULD NEVER DOZE
HOW LIKELY ARE YOU TO NOD OFF OR FALL ASLEEP WHILE SITTING AND READING: WOULD NEVER DOZE

## 2024-07-26 NOTE — ASSESSMENT & PLAN NOTE
Sleep study showed Severe obstructive sleep apnea. Sleep related hypoxemia was not present.  Additional findings from the sleep study showed abnormal findings including Central AHI 37.4, obstructive AHI 10.7, REM AHI 6.7, supine AHI 84.5, respiratory pattern was notable for Cheyne-Infante breathing, and 4.3 minutes with oxygen saturation less than 89%..  Following discussion with patient a shared decision was made to begin therapy with polysomnography with full night PAP titration.  Patient obtained TTE in November 2022 with normal EF

## 2024-07-26 NOTE — PATIENT INSTRUCTIONS
"MY INFORMATION ON SLEEP APNEA-  Kimberly Hernandez    DOCTOR : Esperanza Olvera MD  SLEEP CENTER :      MY CONTACT NUMBER:    [facility]   [phone]  [facility]    Am I having a sleep study at a sleep center?  Make sure you have an appointment for the study before you leave!  [email or web address]        Frequently asked questions:  1. What is Obstructive Sleep Apnea (KARINA)? KARINA is the most common type of sleep apnea. Apnea means, \"without breath.\"  Apnea is most often caused by narrowing or collapse of the upper airway as muscles relax during sleep.   Almost everyone has occasional apneas. Most people with sleep apnea have had brief interruptions at night frequently for many years.  The severity of sleep apnea is related to how frequent and severe the events are.   Apneas are any events where there is no breathing.  Hypopneas are any events where there is shallow breathing. Sleep studies will track and then add all of the apneas and hypopneas into a total and then divided this number by the total time asleep to obtain the apnea hypopnea index(AHI). 0-5 events/per hour = No Sleep Apnea; 5-15 events/per hour = Mild Sleep Apnea; 15-30 events/per hour = Moderate Sleep Apnea; Greater than 30 events/per hour = Severe Sleep Apnea.  Sleep apnea is typically worse during REM sleep due to complete muscle relaxation, including the muscles of the airway. Sleep apnea is also typically worse during supine(sleeping on your back) sleep due to internal structures more easily falling on the top of the airway and external pressures more easily crushing the airway.  The respiratory disturbance index(RDI) includes apneas, hypopneas, and other respiratory events such as snoring that may disturb your sleep.  2. What are the consequences of KARINA? Symptoms include: feeling sleepy during the day, snoring loudly, gasping or stopping " of breathing, trouble sleeping, and occasionally morning headaches or heartburn at night.  Sleepiness can be serious and even increase the risk of falling asleep while driving. Other health consequences may include development of high blood pressure and other cardiovascular disease in persons who are susceptible. Untreated KARINA  can contribute to heart disease, stroke and diabetes.   3. What are the treatment options? In most situations, sleep apnea is a lifelong disease that must be managed with daily therapy. Medications are not effective for sleep apnea and surgery is generally not considered until other therapies have been tried. Your treatment is your choice . Continuous Positive Airway (CPAP) works right away and is the therapy that is effective in nearly everyone. An oral device to hold your jaw forward is usually the next most reliable option. Other options include postioning devices (to keep you off your back), weight loss, and surgery including a tongue pacing device. There is more detail about some of these options below.    Important tips for using CPAP and similar devices   Know your equipment:  CPAP is continuous positive airway pressure that prevents obstructive sleep apnea by keeping the throat from collapsing while you are sleeping. In most cases, the device is  smart  and can slowly self-adjusts if your throat collapses and keeps a record every day of how well you are treated-this information is available to you and your care team.  BPAP is bilevel positive airway pressure that keeps your throat open and also assists each breath with a pressure boost to maintain adequate breathing.  Special kinds of BPAP are used in patients who have inadequate breathing from lung or heart disease. In most cases, the device is  smart  and can slowly self-adjusts to assist breathing. Like CPAP, the device keeps a record of how well you are treated.  Your mask is your connection to the device. You get to choose what  feels most comfortable and the staff will help to make sure if fits. Here: are some examples of the different masks that are available:       Key points to remember on your journey with sleep apnea:  Sleep study.  PAP devices often need to be adjusted during a sleep study to show that they are effective and adjusted right.  Good tips to remember: Try wearing just the mask during a quiet time during the day so your body adapts to wearing it. A humidifier is recommended for comfort in most cases to prevent drying of your nose and throat. Allergy medication from your provider may help you if you are having nasal congestion.  Getting settled-in. It takes more than one night for most of us to get used to wearing a mask. Try wearing just the mask during a quiet time during the day so your body adapts to wearing it. A humidifier is recommended for comfort in most cases. Our team will work with you carefully on the first day and will be in contact within 4 days and again at 2 and 4 weeks for advice and remote device adjustments. Your therapy is evaluated by the device each day.   Use it every night. The more you are able to sleep naturally for 7-8 hours, the more likely you will have good sleep and to prevent health risks or symptoms from sleep apnea. Even if you use it 4 hours it helps. Occasionally all of us are unable to use a medical therapy, in sleep apnea, it is not dangerous to miss one night.   Communicate. Call our skilled team on the number provided on the first day if your visit for problems that make it difficult to wear the device. Over 2 out of 3 patients can learn to wear the device long-term with help from our team. Remember to call our team or your sleep providers if you are unable to wear the device as we may have other solutions for those who cannot adapt to mask CPAP therapy. It is recommended that you sleep your sleep provider within the first 3 months and yearly after that if you are not having  problems.   Take care of your equipment. Make sure you clean your mask and tubing using directions every day and that your filter and mask are replaced as recommended or if they are not working.     BESIDES CPAP, WHAT OTHER THERAPIES ARE THERE?    Positioning Device  Positioning devices are generally used when sleep apnea is mild and only occurs on your back.This example shows a pillow that straps around the waist. It may be appropriate for those whose sleep study shows milder sleep apnea that occurs primarily when lying flat on one's back. Preliminary studies have shown benefit but effectiveness at home may need to be verified by a home sleep test. These devices are generally not covered by medical insurance.    Oral Appliance  What is oral appliance therapy?  An oral appliance device fits on your teeth at night like a retainer used after having braces. The device is made by a specialized dentist and requires several visits over 1-2 months before a manufactured device is made to fit your teeth and is adjusted to prevent your sleep apnea. Once an oral device is working properly, snoring should be improved. A home sleep test may be recommended at that time if to determine whether the sleep apnea is adequately treated.       Some things to remember:  -Oral devices are often, but not always, covered by your medical insurance. Be sure to check with your insurance provider.   -If you are referred for oral therapy, you will be given a list of specialized dentists to consider or you may choose to visit the Web site of the American Academy of Dental Sleep Medicine  -Oral devices are less likely to work if you have severe sleep apnea or are extremely overweight.     More detailed information  An oral appliance is a small acrylic device that fits over the upper and lower teeth  (similar to a retainer or a mouth guard). This device slightly moves jaw forward, which moves the base of the tongue forward, opens the airway,  improves breathing for effective treat snoring and obstructive sleep apnea in perhaps 7 out of 10 people .  The best working devices are custom-made by a dental device  after a mold is made of the teeth 1, 2, 3.  When is an oral appliance indicated?  Oral appliance therapy is recommended as a first-line treatment for patients with primary snoring, mild sleep apnea, and for patients with moderate sleep apnea who prefer appliance therapy to use of CPAP4, 5. Severity of sleep apnea is determined by sleep testing and is based on the number of respiratory events per hour of sleep.   How successful is oral appliance therapy?  The success rate of oral appliance therapy in patients with mild sleep apnea is 75-80% while in patients with moderate sleep apnea it is 50-70%. The chance of success in patients with severe sleep apnea is 40-50%. The research also shows that oral appliances have a beneficial effect on the cardiovascular health of KARINA patients at the same magnitude as CPAP therapy7.  Oral appliances should be a second-line treatment in cases of severe sleep apnea, but if not completely successful then a combination therapy utilizing CPAP plus oral appliance therapy may be effective. Oral appliances tend to be effective in a broad range of patients although studies show that the patients who have the highest success are females, younger patients, those with milder disease, and less severe obesity. 3, 6.   Finding a dentist that practices dental sleep medicine  Specific training is available through the American Academy of Dental Sleep Medicine for dentists interested in working in the field of sleep. To find a dentist who is educated in the field of sleep and the use of oral appliances, near you, visit the Web site of the American Academy of Dental Sleep Medicine.    References  1. jasmeet West al. Objectively measured vs self-reported compliance during oral appliance therapy for sleep-disordered  breathing. Chest 2013; 144(5): 3339-9248.  2. Malcolm, et al. Objective measurement of compliance during oral appliance therapy for sleep-disordered breathing. Thorax 2013; 68(1): 91-96.  3. Adwoa et al. Mandibular advancement devices in 620 men and women with KARINA and snoring: tolerability and predictors of treatment success. Chest 2004; 125: 6803-6570.  4. David et al. Oral appliances for snoring and KARINA: a review. Sleep 2006; 29: 244-262.  5. Rachael et al. Oral appliance treatment for KARINA: an update. J Clin Sleep Med 2014; 10(2): 215-227.  6. Fernandez et al. Predictors of OSAH treatment outcome. J Dent Res 2007; 86: 5197-8961.      Weight Loss:    Weight loss is a long-term strategy that may improve sleep apnea in some patients.    Weight management is a personal decision.  If you are interested in exploring weight loss strategies, the following discussion covers the impact on weight loss on sleep apnea and the approaches that may be successful.    Weight loss decreases severity of sleep apnea in most people with obesity. For those with mild obesity who have developed snoring with weight gain, even 15-30 pound weight loss can improve and occasionally eliminate sleep apnea.  Structured and life-long dietary and health habits are necessary to lose weight and keep healthier weight levels.     Though there may be significant health benefits from weight loss, long-term weight loss is very difficult to achieve- studies show success with dietary management in less than 10% of people. In addition, substantial weight loss may require years of dietary control and may be difficult if patients have severe obesity. In these cases, surgical management may be considered.  Finally, older individuals who have tolerated obesity without health complications may be less likely to benefit from weight loss strategies.    Your BMI is Body mass index is 23.92 kg/m .      Surgery:    Surgery for obstructive sleep apnea is  considered generally only when other therapies fail to work. Surgery may be discussed with you if you are having a difficult time tolerating CPAP and or when there is an abnormal structure that requires surgical correction.  Nose and throat surgeries often enlarge the airway to prevent collapse.  Most of these surgeries create pain for 1-2 weeks and up to half of the most common surgeries are not effective throughout life.  You should carefully discuss the benefits and drawbacks to surgery with your sleep provider and surgeon to determine if it is the best solution for you.   More information  Surgery for KARINA is directed at areas that are responsible for narrowing or complete obstruction of the airway during sleep.  There are a wide range of procedures available to enlarge and/or stabilize the airway to prevent blockage of breathing in the three major areas where it can occur: the palate, tongue, and nasal regions.  Successful surgical treatment depends on the accurate identification of the factors responsible for obstructive sleep apnea in each person.  A personalized approach is required because there is no single treatment that works well for everyone.  Because of anatomic variation, consultation with an examination by a sleep surgeon is a critical first step in determining what surgical options are best for each patient.  In some cases, examination during sedation may be recommended in order to guide the selection of procedures.  Patients will be counseled about risks and benefits as well as the typical recovery course after surgery. Surgery is typically not a cure for a person s KARINA.  However, surgery will often significantly improve one s KARINA severity (termed  success rate ).  Even in the absence of a cure, surgery will decrease the cardiovascular risk associated with OSA7; improve overall quality of life8 (sleepiness, functionality, sleep quality, etc).      Palate Procedures:  Patients with KARINA often have  narrowing of their airway in the region of their tonsils and uvula.  The goals of palate procedures are to widen the airway in this region as well as to help the tissues resist collapse.  Modern palate procedure techniques focus on tissue conservation and soft tissue rearrangement, rather than tissue removal.  Often the uvula is preserved in this procedure. Residual sleep apnea is common in patient after pharyngoplasty with an average reduction in sleep apnea events of 33%2.      Tongue Procedures:  ExamWhile patients are awake, the muscles that surround the throat are active and keep this region open for breathing. These muscles relax during sleep, allowing the tongue and other structures to collapse and block breathing.  There are several different tongue procedures available.  Selection of a tongue base procedure depends on characteristics seen on physical exam.  Generally, procedures are aimed at removing bulky tissues in this area or preventing the back of the tongue from falling back during sleep.  Success rates for tongue surgery range from 50-62%3.    Hypoglossal Nerve Stimulation:  Hypoglossal nerve stimulation has recently received approval from the [place] Food and Drug Administration for the treatment of obstructive sleep apnea.  This is based on research showing that the system was safe and effective in treating sleep apnea6.  Results showed that the median AHI score decreased 68%, from 29.3 to 9.0. This therapy uses an implant system that senses breathing patterns and delivers mild stimulation to airway muscles, which keeps the airway open during sleep.  The system consists of three fully implanted components: a small generator (similar in size to a pacemaker), a breathing sensor, and a stimulation lead.  Using a small handheld remote, a patient turns the therapy on before bed and off upon awakening.    Candidates for this device must be greater than 22 years of age, have moderate to severe KARINA  (AHI between 20-65), BMI less than 32, have tried CPAP/oral appliance without success, and have appropriate upper airway anatomy (determined by a sleep endoscopy performed by Dr. Serrano).    Hypoglossal Nerve Stimulation Pathway:    The sleep surgeon s office will work with the patient through the insurance prior-authorization process (including communications and appeals).    Nasal Procedures:  Nasal obstruction can interfere with nasal breathing during the day and night.  Studies have shown that relief of nasal obstruction can improve the ability of some patients to tolerate positive airway pressure therapy for obstructive sleep apnea1.  Treatment options include medications such as nasal saline, topical corticosteroid and antihistamine sprays, and oral medications such as antihistamines or decongestants. Non-surgical treatments can include external nasal dilators for selected patients. If these are not successful by themselves, surgery can improve the nasal airway either alone or in combination with these other options.      Combination Procedures:  Combination of surgical procedures and other treatments may be recommended, particularly if patients have more than one area of narrowing or persistent positional disease.  The success rate of combination surgery ranges from 66-80%2,3.    References  Lul MARIE. The Role of the Nose in Snoring and Obstructive Sleep Apnoea: An Update.  Eur Arch Otorhinolaryngol. 2011; 268: 1365-73.   Capchamp SM; Mikel JA; Caroline JR; Pallanch JF; Lorne MB; Chito SG; Jerad GUTIERREZD. Surgical modifications of the upper airway for obstructive sleep apnea in adults: a systematic review and meta-analysis. SLEEP 2010;33(10):4786-7023. Raul CELIS. Hypopharyngeal surgery in obstructive sleep apnea: an evidence-based medicine review.  Arch Otolaryngol Head Neck Surg. 2006 Feb;132(2):206-13.  Bo YH1, Ralph Y, Tomi ANISH. The efficacy of anatomically based multilevel surgery for obstructive sleep  apnea. Otolaryngol Head Neck Surg. 2003 Oct;129(4):327-35.  Raul CELIS, Goldberg A. Hypopharyngeal Surgery in Obstructive Sleep Apnea: An Evidence-Based Medicine Review. Arch Otolaryngol Head Neck Surg. 2006 Feb;132(2):206-13.  Faviola HERRING et al. Upper-Airway Stimulation for Obstructive Sleep Apnea.  N Engl J Med. 2014 Jan 9;370(2):139-49.  Nena Y et al. Increased Incidence of Cardiovascular Disease in Middle-aged Men with Obstructive Sleep Apnea. Am J Respir Crit Care Med; 2002 166: 159-165  Carlos EM et al. Studying Life Effects and Effectiveness of Palatopharyngoplasty (SLEEP) study: Subjective Outcomes of Isolated Uvulopalatopharyngoplasty. Otolaryngol Head Neck Surg. 2011; 144: 623-631.

## 2024-07-29 ENCOUNTER — OFFICE VISIT (OUTPATIENT)
Dept: VASCULAR SURGERY | Facility: CLINIC | Age: 79
End: 2024-07-29
Attending: PHYSICIAN ASSISTANT
Payer: COMMERCIAL

## 2024-07-29 ENCOUNTER — ANCILLARY PROCEDURE (OUTPATIENT)
Dept: VASCULAR ULTRASOUND | Facility: CLINIC | Age: 79
End: 2024-07-29
Attending: PHYSICIAN ASSISTANT
Payer: COMMERCIAL

## 2024-07-29 VITALS
RESPIRATION RATE: 16 BRPM | TEMPERATURE: 97.6 F | HEART RATE: 81 BPM | DIASTOLIC BLOOD PRESSURE: 60 MMHG | SYSTOLIC BLOOD PRESSURE: 147 MMHG

## 2024-07-29 DIAGNOSIS — Z72.0 TOBACCO ABUSE: ICD-10-CM

## 2024-07-29 DIAGNOSIS — I73.9 PAD (PERIPHERAL ARTERY DISEASE) (H): Primary | ICD-10-CM

## 2024-07-29 DIAGNOSIS — I70.211 ATHEROSCLEROSIS OF NATIVE ARTERIES OF EXTREMITIES WITH INTERMITTENT CLAUDICATION, RIGHT LEG (H): ICD-10-CM

## 2024-07-29 DIAGNOSIS — E78.2 MIXED HYPERLIPIDEMIA: ICD-10-CM

## 2024-07-29 DIAGNOSIS — I73.9 PAD (PERIPHERAL ARTERY DISEASE) (H): ICD-10-CM

## 2024-07-29 PROCEDURE — 93923 UPR/LXTR ART STDY 3+ LVLS: CPT | Mod: 26 | Performed by: SURGERY

## 2024-07-29 PROCEDURE — 99214 OFFICE O/P EST MOD 30 MIN: CPT | Performed by: PHYSICIAN ASSISTANT

## 2024-07-29 PROCEDURE — 93923 UPR/LXTR ART STDY 3+ LVLS: CPT

## 2024-07-29 PROCEDURE — 93926 LOWER EXTREMITY STUDY: CPT | Mod: RT

## 2024-07-29 PROCEDURE — 93926 LOWER EXTREMITY STUDY: CPT | Mod: 26 | Performed by: SURGERY

## 2024-07-29 PROCEDURE — G0463 HOSPITAL OUTPT CLINIC VISIT: HCPCS | Mod: 25 | Performed by: PHYSICIAN ASSISTANT

## 2024-07-29 RX ORDER — ATORVASTATIN CALCIUM 40 MG/1
40 TABLET, FILM COATED ORAL DAILY
Qty: 90 TABLET | Refills: 0 | Status: SHIPPED | OUTPATIENT
Start: 2024-07-29 | End: 2024-09-16

## 2024-07-29 RX ORDER — VARENICLINE TARTRATE 0.5 (11)-1
KIT ORAL
Qty: 53 TABLET | Refills: 0 | Status: SHIPPED | OUTPATIENT
Start: 2024-07-29

## 2024-07-29 ASSESSMENT — PAIN SCALES - GENERAL: PAINLEVEL: NO PAIN (0)
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
## 2024-07-29 NOTE — Clinical Note
Please call to get scheduled for CTA abdomen/pelvis and clinic visit with Dr. Butt afterwards in the next 1-3 weeks. Thanks
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
## 2024-07-30 ENCOUNTER — TELEPHONE (OUTPATIENT)
Dept: VASCULAR SURGERY | Facility: CLINIC | Age: 79
End: 2024-07-30
Payer: COMMERCIAL

## 2024-08-06 NOTE — TELEPHONE ENCOUNTER
LMTCB schedule CTA and follow up with ET   
LMTCB schedule CTA and follow up with ET      Received: Yesterday  Jennifer Yan RN  [facility]  Please call to get scheduled for CTA abdomen/pelvis and clinic visit with Dr. Butt afterwards in the next 1-3 weeks. Thanks  
Patient answered the phone but was out to breakfast with friends and asked for a call back another time. She did confirm she knows how to reach us as well. [phone]  
Spoke with patient, appt scheduled.   
02-Jun-2021 16:09

## 2024-08-20 ENCOUNTER — THERAPY VISIT (OUTPATIENT)
Dept: SLEEP MEDICINE | Facility: CLINIC | Age: 79
End: 2024-08-20
Payer: COMMERCIAL

## 2024-08-20 DIAGNOSIS — R06.3 CHEYNE-STOKES RESPIRATION: ICD-10-CM

## 2024-08-20 DIAGNOSIS — G47.31 CSA (CENTRAL SLEEP APNEA): ICD-10-CM

## 2024-08-20 PROCEDURE — 95811 POLYSOM 6/>YRS CPAP 4/> PARM: CPT | Performed by: STUDENT IN AN ORGANIZED HEALTH CARE EDUCATION/TRAINING PROGRAM

## 2024-08-21 NOTE — NURSING NOTE
Completed a all night titration PSG per provider order.    Preliminary AHI 38.  A final therapeutic PAP pressure was achieved.    Supine REM was not seen on therapeutic pressure.    Patient reports feeling refreshed in AM.

## 2024-08-22 ENCOUNTER — OFFICE VISIT (OUTPATIENT)
Dept: INTERNAL MEDICINE | Facility: CLINIC | Age: 79
End: 2024-08-22
Payer: COMMERCIAL

## 2024-08-22 VITALS
TEMPERATURE: 98.5 F | RESPIRATION RATE: 18 BRPM | HEART RATE: 72 BPM | HEIGHT: 63 IN | SYSTOLIC BLOOD PRESSURE: 138 MMHG | DIASTOLIC BLOOD PRESSURE: 58 MMHG | BODY MASS INDEX: 23.99 KG/M2

## 2024-08-22 DIAGNOSIS — Z72.0 TOBACCO USE: ICD-10-CM

## 2024-08-22 DIAGNOSIS — I73.9 PAD (PERIPHERAL ARTERY DISEASE) (H): ICD-10-CM

## 2024-08-22 DIAGNOSIS — I10 ESSENTIAL HYPERTENSION: Primary | ICD-10-CM

## 2024-08-22 PROCEDURE — 99214 OFFICE O/P EST MOD 30 MIN: CPT | Performed by: NURSE PRACTITIONER

## 2024-08-22 PROCEDURE — G2211 COMPLEX E/M VISIT ADD ON: HCPCS | Performed by: NURSE PRACTITIONER

## 2024-08-22 RX ORDER — HYDROCODONE BITARTRATE AND ACETAMINOPHEN 5; 325 MG/1; MG/1
1 TABLET ORAL EVERY 8 HOURS PRN
Qty: 20 TABLET | Refills: 0 | Status: SHIPPED | OUTPATIENT
Start: 2024-08-22

## 2024-08-22 RX ORDER — VARENICLINE TARTRATE 0.5 (11)-1
KIT ORAL
Qty: 53 TABLET | Refills: 0 | Status: SHIPPED | OUTPATIENT
Start: 2024-08-22

## 2024-08-22 ASSESSMENT — PAIN SCALES - GENERAL: PAINLEVEL: EXTREME PAIN (9)

## 2024-08-22 NOTE — PROGRESS NOTES
"  Assessment & Plan   Problem List Items Addressed This Visit       Tobacco use     Start Chantix, quit date should be 7 days after beginning the medication          Relevant Medications    varenicline (CHANTIX CECILIO) 0.5 MG X 11 & 1 MG X 42 tablet    Essential hypertension - Primary     Systolic is mildly elevated, no changes to medication regimen at this time          PAD (peripheral artery disease) (H24)     Suspect that leg pain is due to intermittent claudication due to onset of symptoms specifically with activity at a predictable distance of 20 feet. She has an upcoming CTA abdomen/pelvis with runoff next week.   - Continue with statin and aspirin  - Hydrocodone prescribed for use sparingly until a plan is determine with her vascular clinic appointment next week  - Strongly advised tobacco cessation. She asks about Chantix          Relevant Medications    HYDROcodone-acetaminophen (NORCO) 5-325 MG tablet          Linh Harris is a 79 year old, presenting for the following health issues:  Knee Pain    HPI   Concern - Bilateral leg pain that is radiating up to her back   Onset: Couple of weeks   Description: Cannot describe pain, but it all over her legs and moving to her back  Intensity: 9/10  Progression of Symptoms:  same  Accompanying Signs & Symptoms: Back pain   Previous history of similar problem: No  Precipitating factors:        Worsened by: Moving/walking   Alleviating factors:        Improved by: None  Therapies tried and outcome: Tylenol-barely has touched pain. No ice/heat    In the past 1-2 weeks she has developed a posterior leg pain down to the calf, \"it is between a dull and sharp pain\". Then she started to have bilateral low back pain and upper back. The pain is worse with walking, pain is rated 8/10 when walking and 5/10 when sitting. The pain is about a 1/10 when she lies down. She can walk without pain for about 20 feet before the pain worsens. No pain in the feet. No fall or other " "injury. No chest pain or difficulty breathing. No fever. No loss of bowel or bladder control. Acetaminophen and Aleve and ibuprofen did not alleviate pain at all. She is mostly concerned because she has a trip to the [employer] this weekend and is concerned about the pain associated with walking.     CT of the lumbar spine was done 8/2017, no high-grade canal or foraminal narrowing was seen at that time.        Objective    /58 (BP Location: Right arm, Patient Position: Sitting, Cuff Size: Adult Regular)   Pulse 72   Temp 98.5  F (36.9  C) (Oral)   Resp 18   Ht 1.598 m (5' 2.9\")   LMP  (LMP Unknown)   BMI 23.99 kg/m    Body mass index is 23.99 kg/m .    Physical Exam   GENERAL: alert and no distress  CV: regular rate and rhythm. Unable to palpate b/l distal LE pulses.  MS: no spinous process tenderness. Negative b/l  straight leg raises. Normal gait   SKIN: b/l feet are warm to the touch. No cyanosis. Capillary refill is slow. No wounds on lower extremities/feet   NEURO: Normal strength and tone, mentation intact and speech normal  PSYCH: mentation appears normal, affect normal/bright      The longitudinal plan of care for the diagnosis(es)/condition(s) as documented were addressed during this visit. Due to the added complexity in care, I will continue to support Kimberly in the subsequent management and with ongoing continuity of care.    Signed Electronically by: Luz Cedillo NP    "

## 2024-08-26 ENCOUNTER — ANCILLARY PROCEDURE (OUTPATIENT)
Dept: VASCULAR ULTRASOUND | Facility: CLINIC | Age: 79
End: 2024-08-26
Attending: SURGERY
Payer: COMMERCIAL

## 2024-08-26 ENCOUNTER — OFFICE VISIT (OUTPATIENT)
Dept: VASCULAR SURGERY | Facility: CLINIC | Age: 79
End: 2024-08-26
Attending: PHYSICIAN ASSISTANT
Payer: COMMERCIAL

## 2024-08-26 ENCOUNTER — HOSPITAL ENCOUNTER (OUTPATIENT)
Dept: CT IMAGING | Facility: HOSPITAL | Age: 79
Discharge: HOME | End: 2024-08-26
Attending: PHYSICIAN ASSISTANT
Payer: COMMERCIAL

## 2024-08-26 VITALS
TEMPERATURE: 97.6 F | SYSTOLIC BLOOD PRESSURE: 160 MMHG | OXYGEN SATURATION: 100 % | RESPIRATION RATE: 12 BRPM | HEART RATE: 76 BPM | DIASTOLIC BLOOD PRESSURE: 69 MMHG

## 2024-08-26 DIAGNOSIS — I70.211 ATHEROSCLEROSIS OF NATIVE ARTERIES OF EXTREMITIES WITH INTERMITTENT CLAUDICATION, RIGHT LEG (H): ICD-10-CM

## 2024-08-26 DIAGNOSIS — I73.9 PAD (PERIPHERAL ARTERY DISEASE) (H): ICD-10-CM

## 2024-08-26 DIAGNOSIS — Z01.818 ENCOUNTER FOR OTHER PREPROCEDURAL EXAMINATION: ICD-10-CM

## 2024-08-26 DIAGNOSIS — Z01.810 ENCOUNTER FOR PREPROCEDURAL CARDIOVASCULAR EXAMINATION: ICD-10-CM

## 2024-08-26 DIAGNOSIS — I73.9 PAD (PERIPHERAL ARTERY DISEASE) (H): Primary | ICD-10-CM

## 2024-08-26 LAB
CREAT BLD-MCNC: 1 MG/DL (ref 0.6–1.1)
EGFRCR SERPLBLD CKD-EPI 2021: 57 ML/MIN/1.73M2
RADIOLOGIST FLAGS: NORMAL

## 2024-08-26 PROCEDURE — 93970 EXTREMITY STUDY: CPT | Mod: 26 | Performed by: SURGERY

## 2024-08-26 PROCEDURE — 99214 OFFICE O/P EST MOD 30 MIN: CPT | Performed by: SURGERY

## 2024-08-26 PROCEDURE — G0463 HOSPITAL OUTPT CLINIC VISIT: HCPCS | Mod: 25 | Performed by: SURGERY

## 2024-08-26 PROCEDURE — 82565 ASSAY OF CREATININE: CPT

## 2024-08-26 PROCEDURE — 75635 CT ANGIO ABDOMINAL ARTERIES: CPT

## 2024-08-26 PROCEDURE — 93970 EXTREMITY STUDY: CPT

## 2024-08-26 PROCEDURE — 250N000011 HC RX IP 250 OP 636: Performed by: PHYSICIAN ASSISTANT

## 2024-08-26 PROCEDURE — 250N000009 HC RX 250: Performed by: PHYSICIAN ASSISTANT

## 2024-08-26 RX ORDER — IOPAMIDOL 755 MG/ML
90 INJECTION, SOLUTION INTRAVASCULAR ONCE
Status: COMPLETED | OUTPATIENT
Start: 2024-08-26 | End: 2024-08-26

## 2024-08-26 RX ADMIN — SODIUM CHLORIDE 90 ML: 9 INJECTION, SOLUTION INTRAVENOUS at 13:28

## 2024-08-26 RX ADMIN — IOPAMIDOL 90 ML: 755 INJECTION, SOLUTION INTRAVENOUS at 13:27

## 2024-08-26 ASSESSMENT — PAIN SCALES - GENERAL: PAINLEVEL: EXTREME PAIN (8)

## 2024-08-26 NOTE — ASSESSMENT & PLAN NOTE
Suspect that leg pain is due to intermittent claudication due to onset of symptoms specifically with activity at a predictable distance of 20 feet. She has an upcoming CTA abdomen/pelvis with runoff next week.   - Continue with statin and aspirin  - Hydrocodone prescribed for use sparingly until a plan is determine with her vascular clinic appointment next week  - Strongly advised tobacco cessation. She asks about Chantix

## 2024-08-26 NOTE — PROGRESS NOTES
[facility]        Patient is here for a 3 weeks follow up  to discuss Peripheral artery disease (PAD). Symptoms include pain in both lower extremities.    Pt is currently taking Aspirin and Statin.    BP (!) 160/69   Pulse 76   Temp 97.6  F (36.4  C)   Resp 12   LMP  (LMP Unknown)   SpO2 100%     The provider has been notified that the patient has no concerns.     Questions patient would like addressed today are: N/A.    Refills are needed: No    Has homecare services and agency name:  No

## 2024-08-26 NOTE — PATIENT INSTRUCTIONS
Maribel Kimberly,    Thank you for entrusting your care with us today. After your visit today with MD Lanette Butt this is the plan that was discussed at your appointment.    You will be contacted to schedule right leg angiogram    You will be contacted to schedule stress test         I am including additional information on these things and our contact information if you have any questions or concerns.   Please do not hesitate to reach out to us if you felt we did not answer your questions or you are unsure of the treatment plan after your visit today. Our number is [phone].Thank you for trusting us with your care.         Again thank you for your time    Kimberly Hernandez,    Your visit to [facility] for your procedure is coming soon and we look forward to seeing you! This friendly reminder and pre-procedure checklist will help to ensure your procedure goes smoothly and meets your expectations. At [facility], our goal is to provide you with a great patient experience and to deliver genuine, professional care to every patient.     Please complete all the steps in advance of your visit. If you have any questions about the items listed below, please give our office a call. We can be reached at [phone] or visit our website at [email or web address] for more information.     Procedure: Right  Leg  Angiogram     Procedure Date :  TBD    Procedure Time :  TBD    Arrival Time: TBD    2 week Post Procedure Appointment with  BINTA Brizuela and also ultrasound: TBD    6 weeks appt with  and repeat ultrasound: TBD    Admission Type: Outpatient    Surgeon:     Procedure Location: [facility]:  [address] [place] [address] (phone: [phone], Fax: [phone])      If you take blood thinners: SEE SPECIFIC INSTRUCTIONS BELOW   PLEASE DO NOT STOP YOUR ASPIRIN OR PLAVIX UNLESS SPECIFICALLY  DIRECTED BY THE VASCULAR SURGEON TO STOP!  - In most cases Vascular providers want you to continue these. This is different from most NON vascular surgeries and may not be well known by your Primary Care Provider Aspirin: PLEASE DO NOT STOP THIS MEDICATION PRIOR TO SURGERY/ PROCEDURE.     If you take these diabetic medications, please discuss with your primary doctor and follow the hold instructions:   Hold seven (7) days prior for once weekly injectable doses [semaglutide (Ozempic, Wegovy), dulaglutide  (Trulicity), exenatide ER (Bydureon), tirzepatide (Mounjaro)]  Hold the day before and day of for once daily injectable GLP-1 agonists [exenatide (Byetta), liraglutide (Saxenda,Victoza)]  Hold seven (7) days for oral semaglutide (Rybelsus)    Prepare for the peripheral angiography as follows:  Do not eat 8 hours before your procedure. You may have clear liquids up to 1 hour before surgery.  Tell your healthcare provider about all medicines you take and any allergies you may have.  Arrange for a family member or friend to drive you home.  If you are on Metformin, please HOLD for 48 hours after the procedure.  Please be sure to address your diabetic medications with your Primary Care Physician prior to your angiogram.    Peripheral Angiography    Peripheral angiography is an outpatient procedure that makes a  map  of the vessels (arteries) in your lower body, legs, and arms, using X-ray and dye.This map can show where blood flow may be blocked.    An angiogram is commonly performed under sedation with the use of local anesthesia.    The procedure usually starts with a needle put into the femoral (groin) artery. From one treatment site, areas all over the body can be treated.  After access is established, catheters (thin tubes) and wires are threaded through the arterial system to a specific area of interest or throughout the entire body.  As a contrast agent (iodine dye) is injected, X-ray images are taken to let your  provider view the flow of the dye and identify blockages. The surgeon can then choose the best mode of therapy for you - whether during or following the angiogram. This decision depends on your symptoms and the severity and characteristics of the blockages.  Two common therapies that can be provided during the angiogram are balloon angioplasty and stent placement.                   Angioplasty can be used to open arterial blockages. Guided by X-ray, your provider navigates through the blockage with a wire and introduces a special device equipped with an inflatable balloon. After positioning the balloon device across the blocked portion of the artery, the provider inflates the balloon to expand the artery and compress the blockage. The balloon is then deflated and removed while keeping the wire in place across the area that has been treated. Next, contrast dye is injected to assess the result. Treatment is considered a success if blood flow is improved and less than 30% of the blockage remains. If the vessel is still considerably narrowed, placing a stent may be the next step.    Stents are used to prop open an artery at the site of a narrowing. Stents are generally placed after balloon angioplasty when there is residual narrowing or insufficient blood flow in a treated vessel. Stents are considered a permanent implant and cannot be used if you have a metal allergy. Stents that are used in the leg are constructed of a nickel-titanium alloy (Nitinol), a memory-shaped metal. This alloy has a predetermined size and shape at body temperature and expands to this size and shape after being introduced through a catheter. These stents resist kinking and are flexible so that damage from activities that involve your legs is minimized.  Your procedure may require other techniques to address the problem or plaque.     If surgery is felt to be a better option, your vascular provider will obtain any additional X-ray images needed  to plan a surgical bypass of the blocked vessel/s and will then conclude the angiogram.      During the procedure  Here is what to expect:  You may get medicine through an IV (intravenous) line to relax you. You re given an injection to numb the insertion site. Then, a tiny skin cut (incision) is made near an artery in your groin.  Your provider inserts a thin tube (catheter) through the incision. He or she then threads the catheter into an artery while looking at a video monitor.  Contrast  dye  is injected into the catheter to confirm position. You may feel warmth or pressure in your legs and back. You lie still as X-rays are taken. The catheter is then taken out.  After the procedure  You ll be taken to a recovery area. A healthcare provider will apply pressure to the site for about 10 minutes. Your healthcare provider will tell you how long to lie down and keep the insertion site still. Your healthcare provider will discuss the results with you soon after the procedure.      Angiogram Procedure Discharge Instructions:     1. If you received sedation for your procedure: Do not drive or operate heavy machinery for the rest of the day.  2. Avoid strenuous activity for 72 hours (3 days):                        - Do not lift greater than 10 pounds.                        - Excessive exercise                        - Straining                        - Return to your normal activities as you tolerate after the 3 days restriction  3. Avoid tub baths, Jacuzzis, hot tubs and pools for 72 hours (3 days) or until puncture site is will healed.  4. You may shower beginning tomorrow. Do not scrub puncture site(s) until well healed, pat dry.  5. You can expect to return to work 1-2 days after your procedure - depending on the nature of your profession.  6. It is normal to have some tenderness and minimal swelling at puncture site. A small area of discoloration may be present. Tenderness typically subsides in 24-48 hours. A  small knot may also be present at puncture site for 6-8 weeks, this can be a normal part of the healing process.       After the angiogram If you:      1. Experience any bleeding or active swelling from puncture site: Lie down, firmly apply pressure to puncture site and CALL 9-1-1  2. Fever greater than 101 degrees Fahrenheit.  3. Redness, swelling, warmth to touch, or purulent (yellow/green/foul smelling) drainage from the puncture site.  4. Increasing pain, tenderness or swelling at puncture site OR of arm/leg near puncture site.  5. Feeling weak or faint.  6. Change in color, temperature, or sensation of arm/leg where puncture was made.  7. You can t feel your thrill (pulse at your dialysis fistula site) or it feels weak (If you had fistulogram done).     Call us with any other questions or concerns after your procedure: [phone]      All invasive procedures can have complications. While the risk of an angiogram is low it is not zero. The most common complications are related to the arterial access site.       Risks/ Complications   Bruising is Common  You will likely have bruising (ecchymosis) where the artery was entered.    Pain and Bleeding  Less commonly, patients experience pain and bleeding that may include blood collecting under the skin (hematoma).    Blockage and Leakage   In rare cases, the access artery can become blocked. Infrequently, patients experience persistent leakage of blood where the artery was entered, which can result in the formation of a pseudoaneurysm--a blood-filled sac--that may require further treatment.  Other complications related to an angiogram include:   Allergic reaction to the iodine contrast dye, which can lead to the development of kidney failure.  Very rarely during balloon angioplasty and/or stent placement, part of the arterial blockage can break off (embolism) and travel to more distant arteries. This can worsen blood flow.    Pre-Procedure checklist:    [] A Pre-op  "physical within 30 days of the procedure is required. You will need to set up an appointment with your primary care provider.  [] Contact your insurance regarding coverage  If you would like a Good Millicent Estimate for your upcoming procedure at [facility], contact Cost of Care Estimates   Advocates are available Monday through Friday 8am - 5pm     You may also submit a request online at [email or web address]  - Complete the secure online form found under \"Services and Procedure Pricing\"   If your procedure is at [facility], please contact the numbers below for Cost of Care Estimates.   - Facility Charge: [phone]    Anesthesiology charge:  [phone]   [] DO NOT BRING FMLA WITH TO SURGERY.  These should be sent to the provider's office by fax to .     [] Day of Surgery  Medications - Take as indicated with sips of water.   Wear comfortable loose-fitting clothes. Wear your glasses-Not contacts. Do not wear jewelry and remove body piercings. Surgery may be cancelled if they are not removed.   You may have 1 family member wait in the lobby during your surgery. Visitor restrictions are subject to change. Please verify with the surgery nurse when they call.   If same day surgery-Have a someone come with you to surgery that can help you understand the surgeon's instructions, drive you home and stay with you overnight the first night.    [] You will receive a call from a nurse 1-3 days prior to the procedure. They will go over more details with you    Notify our office right away, if you have any changes in your health status, or if you develop a cold, flu, diarrhea, infection, fever or sore throat before your scheduled surgery date. We can be reached at  [phone] Monday-Friday 8 am-4:30 pm if you have any questions.   Thank you for choosing [facility]    Please scan QR codes to watch videos about Angioplasty for Peripheral Artery Disease. There are " links provided if you are unable to use the code.                                                                                                                                       [email or web address]                    [email or web address]  Angioplasty for Peripheral Artery Disease: Before Your Procedure                                               Angioplasty for Peripheral Artery Disease: Returning Home    Your are scheduled for a  Lexiscan- Nuclear Stress Test  Your appointment is scheduled on ___________________________      Your physician has referred you for a Cardiac MRI exam.  Our staff is dedicated to providing you and your physician with the best possible Cardiac MRI imaging experience.  Please take a few minutes to read this general information about Cardiac MRI to help prepare for your exam.  Please ask if you have any questions.       What is a Cardiac MRI?  Magnetic Resonance Imaging (MRI) is a diagnostic procedure that allows physicians the ability to view specific areas of your body in great detail using a strong magnet and radio frequency waves.  For this Cardiac MRI exam we will be focusing on the heart and its surrounding structures.  Your procedure may include a medication used during a stress test called Lexiscan .  This will depend on the reason for your exam and is determined by the ordering physician.    What happens during a Cardiac MRI exam?  The cardiac MRI exam is painless; however, the scanner produces a loud  knocking  noise throughout the exam.  To make you comfortable, we will provide you with earplugs or music.  To ensure clear images, you will need to hold very still during the exam and hold your breath as instructed by the technologist.  The technologist conducts the test from the control room and is able to see  and assist you at all times.  In many cases, a contrast is needed to obtain more detailed information.  This is done through an intravenous (IV) injection and takes only a short time.      How do I prepare for the MRI scan?  Upon arrival, you will be asked to complete a MRI questionnaire to ensure your safety within the magnetic field.  You will change into a hospital gown for the procedure.  Before the exam, you will be asked to remove your jewelry, keys, coins, and any other items deemed unsafe for MRI.  Your belongings will be locked safely outside the procedure room.      How long will this take?  The cardiac MRI will last between 60 and 90 minutes.  The number of images required may vary based on your Physician s order.  This will determine the length of your test.  You should plan for the entire visit to last between 2 and 3 hours from check-in.    What happens after the test?  You will be able to resume all normal activities following the exam unless otherwise instructed by your doctor.  A trained cardiologist and/or radiologist will interpret your exam and provide a report to your doctor within about 3-5 business days.  If you do not hear from your doctor, please contact your ordering physician s office directly.                                                  If your Cardiac MRI exam includes stress testing (Lexiscan ), you should follow these instructions:  Please let your doctor know if you are pregnant or trying to get pregnant.  Do not eat, drink or use tobacco products for four (4) hours prior to the test EXCEPT up to 500ml (or about two 8 ounce glasses) of water.  It is very important that you do not eat or drink products that contain caffeine for 24 hours before your test time.  This includes regular coffee, decaffeinated coffee, tea, cocoa, soda/pop, foods containing chocolate (candy, cake, milk, pudding, etc.) and energy drinks.  Some over the counter medications contain caffeine so please read  the label or ask your pharmacist if you have questions.    Bring a written list of the prescription and non-prescription medications you are currently taking.  Include over-the-counter medications, vitamins and herbal supplements.  If you use any inhalers, please bring them with you to your appointment.    A nurse will call you 24 to 48 hours prior to your appointment to go over instructions about the test and whether or not to take your medications prior to your appointment.  Please call [phone] between 7:30am-4:00pm Monday through Friday if you have questions for a nurse.  If you are diabetic, please contact your primary physician for instructions on your diabetic medications.    Please let your ordering doctor know if you have a history of problems with claustrophobia.  Sedation medication may be considered.  A  will be required following the test if sedation is prescribed.      SEE BELOW FOR MEDICATION INSTRUCTIONS:                      Lexiscan (Regadenoson) Stress Test  Nothing to eat or drink four hours before the test  No caffeine (coffee, decaf coffee, soda, candy or Excedrin) 24 hours before the test      Aminophylline/Theophylline containing products:  Accurbon Dyphylline Quibron Moreno-24   Aerolate Emfaseem Quinamm TheoBID   Amesec Elixicon Respbid Theochron   Aminodur Elixophyllin Slo-bid Gyrocaps Theoclear   Aminophyllin Isofil Slo-Phyllin Moreno-Dur   Aminophylline Isuprel Compound Elixir Somophyllin Theofedral   Aquaphyllin LaBID Sustairre Theolair   Asbron Lufyllin Synophylate Theon   Asminyl Marax T.[employer].H. Moreno-Organidin   Azma-Aid Mudrane Tedral Theophedrizine   Bronkaid Tabs Neothylline Moreno-24 Theophylline   Bronkodyl Phyllocontin TheoBID Theophyl   Bronkolixir Physpan Theochron Theospan   Bronkotabs Primatene Tablets Theoclear Theostat   Constant-T Pulm TD Moreno-Dur Theovent   Co-Naga Quadrinal Theofedral Uniphyl   Dilor        These drugs mist not be taken 12 hours before Lexiscan  Administration      This drug must not be taken 24 hours before Lexiscan Administration   Trental (Pentoxifylline)    These drugs must not be taken 48 hours before Lexiscan Adminstration   Persantine (Dipryidamole) Pletal (Cilosatzol)   Aggrenox (Aspirin & Dipryidamole) Permol (Dipryidamole)     This drug must not be taken 85 hours before Lexiscan Adminstration  Roflumilast (phosphodiesterase-4 inhibitor)    Phosphodiesterase Inhibitors should be help as recommended below:    Sildenafil (Viagra)  48 hours   Vardenafil (Levitra) 48 hours   Avanafil (Stendra) 48 hours   Tadalafil (Cialis & Adcirca) 85 hours
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
## 2024-08-26 NOTE — Clinical Note
Please call to schedule R leg angio- needs stress prior to scheduling. Carol Ann if you want to look out for stress test results prior to angio.

## 2024-08-27 ENCOUNTER — TELEPHONE (OUTPATIENT)
Dept: VASCULAR SURGERY | Facility: CLINIC | Age: 79
End: 2024-08-27
Payer: COMMERCIAL

## 2024-08-27 DIAGNOSIS — K52.9 INFLAMMATION OF COLONIC MUCOSA: Primary | ICD-10-CM

## 2024-08-27 NOTE — TELEPHONE ENCOUNTER
Pt had CTA runoff done today. Red flag noted below and Dr. Butt updated.     Per Dr. Butt She was asymptomatic, send pt to  GI for colonoscopy consideration.     Pt was updated on the results. She does not see a GI provider currently and is having no GI symptoms. Referral sent. Told to call back if GI does not reach out to her.     [Consider Follow Up: Wall thickening and inflammation of the transverse colon, suspicious for infectious or inflammatory colitis, recommend follow-up with routine contrast enhanced abdominal CT]     1.  This report will be copied to the [facility] to ensure a provider acknowledges the finding.     Component   Radiologist flags   Wall thickening and inflammation of the transverse colon, suspicious for infectious or inflammatory colitis, recommend follow-up with routine contrast enhanced abdominal CT

## 2024-08-27 NOTE — TELEPHONE ENCOUNTER
Spoke to pt about scheduling their stress test needed for their angiogram. Pt would like to do their stress test in [place]. Pt has been scheduled on 9/4/2024.     Angiogram Scheduling    Procedure: Right  Leg  Angiogram     Procedure Date :  9/25/2024    Procedure Time :  8:00am    Arrival Time: 7:00am    Admission Type: Outpatient    Surgeon:     Procedure Location: [facility]:  [address] [place] [address] (phone: [phone], Fax: [phone])    Consent Completed:No, Scanned: No    Scheduled with: Oli in IR Scheduling.     Anesthesia Needed: no IF Yes Please clarify that the CRNA, anesthesia and PERI/PACU resources are being added at scheduling    Blood Thinners Address: Message sent to support pool to review medications.     2 week Post Procedure Appointment with   and also ultrasound: See appt desk.

## 2024-08-30 NOTE — TELEPHONE ENCOUNTER
Writer received notification from Dr. Butt to include [employer] rep for laser atherectomy.     Email sent to rep. See screenshot below.       Laser needs to be requested by cardiology. Message sent via teams to reserve the laser for the pt's angiogram. Laser confirmed on 8/30/2024.

## 2024-09-04 ENCOUNTER — HOSPITAL ENCOUNTER (OUTPATIENT)
Dept: CARDIOLOGY | Facility: HOSPITAL | Age: 79
Discharge: HOME | End: 2024-09-04
Attending: SURGERY
Payer: COMMERCIAL

## 2024-09-04 ENCOUNTER — HOSPITAL ENCOUNTER (OUTPATIENT)
Dept: NUCLEAR MEDICINE | Facility: HOSPITAL | Age: 79
Discharge: HOME | End: 2024-09-04
Attending: SURGERY
Payer: COMMERCIAL

## 2024-09-04 DIAGNOSIS — I70.211 ATHEROSCLEROSIS OF NATIVE ARTERIES OF EXTREMITIES WITH INTERMITTENT CLAUDICATION, RIGHT LEG (H): ICD-10-CM

## 2024-09-04 DIAGNOSIS — Z01.810 ENCOUNTER FOR PREPROCEDURAL CARDIOVASCULAR EXAMINATION: ICD-10-CM

## 2024-09-04 DIAGNOSIS — I73.9 PAD (PERIPHERAL ARTERY DISEASE) (H): ICD-10-CM

## 2024-09-04 LAB
CV STRESS CURRENT BP HE: NORMAL
CV STRESS CURRENT HR HE: 62
CV STRESS CURRENT HR HE: 64
CV STRESS CURRENT HR HE: 65
CV STRESS CURRENT HR HE: 74
CV STRESS CURRENT HR HE: 75
CV STRESS CURRENT HR HE: 76
CV STRESS CURRENT HR HE: 76
CV STRESS CURRENT HR HE: 79
CV STRESS CURRENT HR HE: 81
CV STRESS CURRENT HR HE: 82
CV STRESS CURRENT HR HE: 82
CV STRESS CURRENT HR HE: 86
CV STRESS CURRENT HR HE: 87
CV STRESS CURRENT HR HE: 88
CV STRESS CURRENT HR HE: 89
CV STRESS DEVIATION TIME HE: NORMAL
CV STRESS ECHO PERCENT HR HE: NORMAL
CV STRESS EXERCISE STAGE HE: NORMAL
CV STRESS FINAL RESTING BP HE: NORMAL
CV STRESS FINAL RESTING HR HE: 86
CV STRESS MAX HR HE: 92
CV STRESS MAX TREADMILL GRADE HE: 0
CV STRESS MAX TREADMILL SPEED HE: 0
CV STRESS PEAK DIA BP HE: NORMAL
CV STRESS PEAK SYS BP HE: NORMAL
CV STRESS PHASE HE: NORMAL
CV STRESS PROTOCOL HE: NORMAL
CV STRESS RESTING PT POSITION HE: NORMAL
CV STRESS ST DEVIATION AMOUNT HE: NORMAL
CV STRESS ST DEVIATION ELEVATION HE: NORMAL
CV STRESS ST EVELATION AMOUNT HE: NORMAL
CV STRESS TEST TYPE HE: NORMAL
CV STRESS TOTAL STAGE TIME MIN 1 HE: NORMAL
NUC STRESS EJECTION FRACTION: 74 %
RATE PRESSURE PRODUCT: NORMAL
STRESS ECHO BASELINE DIASTOLIC HE: 62
STRESS ECHO BASELINE HR: 62
STRESS ECHO BASELINE SYSTOLIC BP: 128
STRESS ECHO CALCULATED PERCENT HR: 65 %
STRESS ECHO LAST STRESS DIASTOLIC BP: 60
STRESS ECHO LAST STRESS HR: 82
STRESS ECHO LAST STRESS SYSTOLIC BP: 136
STRESS ECHO TARGET HR: 142

## 2024-09-04 PROCEDURE — 93016 CV STRESS TEST SUPVJ ONLY: CPT | Performed by: INTERNAL MEDICINE

## 2024-09-04 PROCEDURE — 78452 HT MUSCLE IMAGE SPECT MULT: CPT | Mod: 26 | Performed by: INTERNAL MEDICINE

## 2024-09-04 PROCEDURE — 250N000011 HC RX IP 250 OP 636: Performed by: SURGERY

## 2024-09-04 PROCEDURE — A9500 TC99M SESTAMIBI: HCPCS | Performed by: SURGERY

## 2024-09-04 PROCEDURE — 93017 CV STRESS TEST TRACING ONLY: CPT

## 2024-09-04 PROCEDURE — 343N000001 HC RX 343: Performed by: SURGERY

## 2024-09-04 PROCEDURE — 93018 CV STRESS TEST I&R ONLY: CPT | Performed by: INTERNAL MEDICINE

## 2024-09-04 PROCEDURE — 78452 HT MUSCLE IMAGE SPECT MULT: CPT

## 2024-09-04 RX ORDER — REGADENOSON 0.08 MG/ML
0.4 INJECTION, SOLUTION INTRAVENOUS ONCE
Status: COMPLETED | OUTPATIENT
Start: 2024-09-04 | End: 2024-09-04

## 2024-09-04 RX ORDER — AMINOPHYLLINE 25 MG/ML
50-100 INJECTION, SOLUTION INTRAVENOUS
Status: DISCONTINUED | OUTPATIENT
Start: 2024-09-04 | End: 2024-09-05 | Stop reason: HOSPADM

## 2024-09-04 RX ADMIN — Medication 8.4 MILLICURIE: at 10:17

## 2024-09-04 RX ADMIN — REGADENOSON 0.4 MG: 0.08 INJECTION, SOLUTION INTRAVENOUS at 11:27

## 2024-09-04 RX ADMIN — Medication 31.5 MILLICURIE: at 11:25

## 2024-09-08 LAB — SLPCOMP: NORMAL

## 2024-09-08 NOTE — PROCEDURES
" SLEEP STUDY INTERPRETATION  TITRATION STUDY      Patient: MAYA PELAYO  YOB: 1945  Study Date: 8/20/2024  MRN: [id number]  Referring Provider: Luz Cedillo NP  Ordering Provider: Esperanza Olvera MD    Indications for Polysomnography: The patient is a 79 year old Female who is 5' 3\" and weighs 135.0 lbs. Her BMI is 23.9, [place] sleepiness scale 1/24 and neck circumference is 38 cm. Relevant medical history includes HTN, HLD, CAD/PAD, epilepsy, psoriasis, and CSA/Cheyne-Infante breathing. A polysomnogram with PAP titration was performed to correct for sleep apnea/PLMS/RLS/RBD/S-S/CSA/hypoventilation/hypoxemia.    Polysomnogram Data: A full night polysomnogram recorded the standard physiologic parameters including EEG, EOG, EMG, ECG, nasal and oral airflow. Respiratory parameters of chest and abdominal movements were recorded with respiratory inductance plethysmography. Oxygen saturation was recorded by pulse oximetry. Hypopnea scoring rule used: 1B 4%.    Treatment PSG  Sleep Architecture: Sleep was fragmented with an increased arousal index. Sleep efficiency was decreased with a normal sleep latency and a prolonged wake after sleep onset. All stages of sleep were observed.   The total recording time of the polysomnogram was 430.0 minutes. The total sleep time was 357.5 minutes. Sleep latency was normal at 12.0 minutes without the use of a sleep aid. REM latency was 197.5 minutes. Arousal index was increased at 22.3 arousals per hour. Sleep efficiency was decreased at 83.1%. Wake after sleep onset was 60.5 minutes. The patient spent 16.2% of total sleep time in Stage N1, 49.4% in Stage N2, 24.5% in Stage N3, and 9.9% in REM. Time in REM supine was 3.5 minutes.    Respiration: Titration was considered adequate with a greater than 75% reduction of baseline AHI; however, REM supine sleep was not observed at final treatment pressure.   The patient was titrated at pressures ranging from CPAP 5 cmH2O " up to ASV 7/0/20 cmH2O. The final pressure achieved was ASV 7/0/18 cmH2O with a residual AHI of 3.5 events per hour. Time in REM supine on final pressure was - minutes.   This titration was considered:  Adequate (residual AHI with 75% decrease or above constraints without REMsupine sleep at final pressure).  Snoring - was reported as absent on treatment.  Respiratory rate and pattern - was notable for normal respiratory rate and pattern.  Sustained Sleep Associated Hypoventilation - Transcutaneous carbon dioxide monitoring was not used.  Sleep Associated Hypoxemia - (Greater than 5 minutes O2 sat at or below 88% was not present. Baseline oxygen saturation was 95.5%. Lowest oxygen saturation was 91.0%. Time spent less than or equal to 88% was 0 minutes. Time spent less than or equal to 89% was 0 minutes.    Movement Activity: No abnormal movement or behaviors were observed.   Periodic Limb Activity - There were - PLMs during the entire study. The PLM index was - movements per hour. The PLM Arousal Index was - per hour.  REM EMG Activity - Excessive transient/sustained muscle activity was not present.  Nocturnal Behavior - Abnormal sleep related behaviors were not noted during/arising out of NREM / REM sleep.  Bruxism - None apparent.    Cardiac Summary: Normal sinus rhythm.   The average pulse rate was 60.3 bpm. The minimum pulse rate was 47.0 bpm while the maximum pulse rate was 80.0 bpm. Arrhythmias were not noted.            Assessment:   Sleep Architecture: Sleep was fragmented with an increased arousal index. Sleep efficiency was decreased with a normal sleep latency and a prolonged wake after sleep onset. All stages of sleep were observed.   Respiration: Titration was considered adequate with a greater than 75% reduction of baseline AHI; however, REM supine sleep was not observed at final treatment pressure.   Movement Activity: No abnormal movement or behaviors were observed.   Cardiac Summary: Normal sinus  rhythm.       Recommendations:  Treatment of CSA with ASV at7/0/18 cmH2O. Recommend clinical follow up with sleep management team, for coaching and review of effectiveness and compliance measures.  Advice regarding the risks of drowsy driving.  Suggest optimizing sleep schedule and avoiding sleep deprivation.  Weight management (if BMI > 30).  Pharmacologic therapy should be used for management of restless legs syndrome only if present and clinically indicated and not based on the presence of periodic limb movements alone.    Diagnostic Codes:   Central Sleep Apnea G47.33  Cheyne?Infante Breathing Pattern R06.3    8/20/2024 War Titration Sleep Study (135.0 lbs) - Treatment was titrated to a pressure of ASV 7/0/18 with an AHI of 3.5. Time Spent in REM supine at this pressure was 0 minutes.      _____________________________________   Electronically Signed By: Esperanza Olvera MD 09/07/2024

## 2024-09-10 DIAGNOSIS — I10 ESSENTIAL HYPERTENSION: ICD-10-CM

## 2024-09-10 RX ORDER — AMLODIPINE BESYLATE 10 MG/1
10 TABLET ORAL DAILY
Qty: 90 TABLET | Refills: 1 | Status: SHIPPED | OUTPATIENT
Start: 2024-09-10

## 2024-09-10 RX ORDER — HYDROCHLOROTHIAZIDE 25 MG/1
25 TABLET ORAL DAILY
Qty: 90 TABLET | Refills: 1 | Status: SHIPPED | OUTPATIENT
Start: 2024-09-10

## 2024-09-16 ENCOUNTER — OFFICE VISIT (OUTPATIENT)
Dept: INTERNAL MEDICINE | Facility: CLINIC | Age: 79
End: 2024-09-16
Payer: COMMERCIAL

## 2024-09-16 VITALS
HEART RATE: 87 BPM | HEIGHT: 62 IN | RESPIRATION RATE: 20 BRPM | TEMPERATURE: 98.3 F | WEIGHT: 129 LBS | BODY MASS INDEX: 23.74 KG/M2 | OXYGEN SATURATION: 95 % | SYSTOLIC BLOOD PRESSURE: 122 MMHG | DIASTOLIC BLOOD PRESSURE: 52 MMHG

## 2024-09-16 DIAGNOSIS — I73.9 PAD (PERIPHERAL ARTERY DISEASE) (H): ICD-10-CM

## 2024-09-16 DIAGNOSIS — M85.851 OSTEOPENIA OF BOTH HIPS: ICD-10-CM

## 2024-09-16 DIAGNOSIS — M85.852 OSTEOPENIA OF BOTH HIPS: ICD-10-CM

## 2024-09-16 DIAGNOSIS — G40.909 NONINTRACTABLE EPILEPSY WITHOUT STATUS EPILEPTICUS, UNSPECIFIED EPILEPSY TYPE (H): ICD-10-CM

## 2024-09-16 DIAGNOSIS — I10 ESSENTIAL HYPERTENSION: Primary | ICD-10-CM

## 2024-09-16 DIAGNOSIS — I67.1 CEREBRAL ANEURYSM, NONRUPTURED: ICD-10-CM

## 2024-09-16 DIAGNOSIS — Z72.0 TOBACCO USE: ICD-10-CM

## 2024-09-16 DIAGNOSIS — I99.9 MILD VASCULAR NEUROCOGNITIVE DISORDER: ICD-10-CM

## 2024-09-16 DIAGNOSIS — B35.1 ONYCHOMYCOSIS: ICD-10-CM

## 2024-09-16 DIAGNOSIS — E78.2 MIXED HYPERLIPIDEMIA: ICD-10-CM

## 2024-09-16 DIAGNOSIS — I72.0 ANEURYSM OF CAROTID ARTERY (H): ICD-10-CM

## 2024-09-16 DIAGNOSIS — F06.70 MILD VASCULAR NEUROCOGNITIVE DISORDER: ICD-10-CM

## 2024-09-16 DIAGNOSIS — G47.31 CSA (CENTRAL SLEEP APNEA): ICD-10-CM

## 2024-09-16 LAB
ALBUMIN SERPL BCG-MCNC: 4.4 G/DL (ref 3.5–5.2)
ALP SERPL-CCNC: 102 U/L (ref 40–150)
ALT SERPL W P-5'-P-CCNC: 15 U/L (ref 0–50)
ANION GAP SERPL CALCULATED.3IONS-SCNC: 9 MMOL/L (ref 7–15)
AST SERPL W P-5'-P-CCNC: 27 U/L (ref 0–45)
BILIRUB DIRECT SERPL-MCNC: <0.2 MG/DL (ref 0–0.3)
BILIRUB SERPL-MCNC: 0.2 MG/DL
BUN SERPL-MCNC: 18.8 MG/DL (ref 8–23)
CALCIUM SERPL-MCNC: 10.1 MG/DL (ref 8.8–10.4)
CHLORIDE SERPL-SCNC: 101 MMOL/L (ref 98–107)
CHOLEST SERPL-MCNC: 172 MG/DL
CREAT SERPL-MCNC: 1.07 MG/DL (ref 0.51–0.95)
EGFRCR SERPLBLD CKD-EPI 2021: 53 ML/MIN/1.73M2
ERYTHROCYTE [DISTWIDTH] IN BLOOD BY AUTOMATED COUNT: 13.6 % (ref 10–15)
FASTING STATUS PATIENT QL REPORTED: ABNORMAL
FASTING STATUS PATIENT QL REPORTED: NORMAL
GLUCOSE SERPL-MCNC: 97 MG/DL (ref 70–99)
HCO3 SERPL-SCNC: 29 MMOL/L (ref 22–29)
HCT VFR BLD AUTO: 41.7 % (ref 35–47)
HDLC SERPL-MCNC: 95 MG/DL
HGB BLD-MCNC: 14 G/DL (ref 11.7–15.7)
LDLC SERPL CALC-MCNC: 63 MG/DL
MCH RBC QN AUTO: 33.2 PG (ref 26.5–33)
MCHC RBC AUTO-ENTMCNC: 33.6 G/DL (ref 31.5–36.5)
MCV RBC AUTO: 99 FL (ref 78–100)
NONHDLC SERPL-MCNC: 77 MG/DL
PHENYTOIN SERPL-MCNC: 15.4 UG/ML
PLATELET # BLD AUTO: 239 10E3/UL (ref 150–450)
POTASSIUM SERPL-SCNC: 4.3 MMOL/L (ref 3.4–5.3)
PROT SERPL-MCNC: 7.1 G/DL (ref 6.4–8.3)
RBC # BLD AUTO: 4.22 10E6/UL (ref 3.8–5.2)
SODIUM SERPL-SCNC: 139 MMOL/L (ref 135–145)
TRIGL SERPL-MCNC: 72 MG/DL
WBC # BLD AUTO: 6.6 10E3/UL (ref 4–11)

## 2024-09-16 PROCEDURE — 36415 COLL VENOUS BLD VENIPUNCTURE: CPT | Performed by: NURSE PRACTITIONER

## 2024-09-16 PROCEDURE — 99213 OFFICE O/P EST LOW 20 MIN: CPT | Performed by: NURSE PRACTITIONER

## 2024-09-16 PROCEDURE — G2211 COMPLEX E/M VISIT ADD ON: HCPCS | Performed by: NURSE PRACTITIONER

## 2024-09-16 PROCEDURE — 80185 ASSAY OF PHENYTOIN TOTAL: CPT | Performed by: NURSE PRACTITIONER

## 2024-09-16 PROCEDURE — 90662 IIV NO PRSV INCREASED AG IM: CPT | Performed by: NURSE PRACTITIONER

## 2024-09-16 PROCEDURE — 80061 LIPID PANEL: CPT | Performed by: NURSE PRACTITIONER

## 2024-09-16 PROCEDURE — 80053 COMPREHEN METABOLIC PANEL: CPT | Performed by: NURSE PRACTITIONER

## 2024-09-16 PROCEDURE — 82248 BILIRUBIN DIRECT: CPT | Performed by: NURSE PRACTITIONER

## 2024-09-16 PROCEDURE — 85027 COMPLETE CBC AUTOMATED: CPT | Performed by: NURSE PRACTITIONER

## 2024-09-16 PROCEDURE — G0008 ADMIN INFLUENZA VIRUS VAC: HCPCS | Performed by: NURSE PRACTITIONER

## 2024-09-16 RX ORDER — ACETAMINOPHEN 325 MG/1
650 TABLET ORAL
OUTPATIENT
Start: 2024-09-23

## 2024-09-16 RX ORDER — EPINEPHRINE 1 MG/ML
0.3 INJECTION, SOLUTION, CONCENTRATE INTRAVENOUS EVERY 5 MIN PRN
OUTPATIENT
Start: 2024-09-23

## 2024-09-16 RX ORDER — ZOLEDRONIC ACID 5 MG/100ML
5 INJECTION, SOLUTION INTRAVENOUS ONCE
Start: 2024-09-23

## 2024-09-16 RX ORDER — ALBUTEROL SULFATE 90 UG/1
1-2 AEROSOL, METERED RESPIRATORY (INHALATION)
Start: 2024-09-23

## 2024-09-16 RX ORDER — DIPHENHYDRAMINE HYDROCHLORIDE 50 MG/ML
50 INJECTION INTRAMUSCULAR; INTRAVENOUS
Start: 2024-09-23

## 2024-09-16 RX ORDER — MEPERIDINE HYDROCHLORIDE 25 MG/ML
25 INJECTION INTRAMUSCULAR; INTRAVENOUS; SUBCUTANEOUS EVERY 30 MIN PRN
OUTPATIENT
Start: 2024-09-23

## 2024-09-16 RX ORDER — HEPARIN SODIUM,PORCINE 10 UNIT/ML
5-20 VIAL (ML) INTRAVENOUS DAILY PRN
OUTPATIENT
Start: 2024-09-23

## 2024-09-16 RX ORDER — HEPARIN SODIUM (PORCINE) LOCK FLUSH IV SOLN 100 UNIT/ML 100 UNIT/ML
5 SOLUTION INTRAVENOUS
OUTPATIENT
Start: 2024-09-23

## 2024-09-16 RX ORDER — ALBUTEROL SULFATE 0.83 MG/ML
2.5 SOLUTION RESPIRATORY (INHALATION)
OUTPATIENT
Start: 2024-09-23

## 2024-09-16 RX ORDER — METHYLPREDNISOLONE SODIUM SUCCINATE 125 MG/2ML
125 INJECTION, POWDER, LYOPHILIZED, FOR SOLUTION INTRAMUSCULAR; INTRAVENOUS
Start: 2024-09-23

## 2024-09-16 RX ORDER — ATORVASTATIN CALCIUM 40 MG/1
40 TABLET, FILM COATED ORAL DAILY
Qty: 90 TABLET | Refills: 3 | Status: SHIPPED | OUTPATIENT
Start: 2024-09-16 | End: 2024-09-18 | Stop reason: DRUGHIGH

## 2024-09-16 ASSESSMENT — ENCOUNTER SYMPTOMS: BACK PAIN: 1

## 2024-09-16 NOTE — PATIENT INSTRUCTIONS
Sleep clinic follow up [phone]     Reminder to get a tetanus shot, shingles vaccine (2nd one), and a COVID through your pharmacy

## 2024-09-16 NOTE — LETTER
September 17, 2024      Kimberly Hernandez  [address] [address]        Dear ,    We are writing to inform you of your test results.    Your test results fall within the expected range(s) or remain unchanged from previous results.  Please continue with current treatment plan.    Resulted Orders   Lipid panel reflex to direct LDL Fasting   Result Value Ref Range    Cholesterol 172 <200 mg/dL    Triglycerides 72 <150 mg/dL    Direct Measure HDL 95 >=50 mg/dL    LDL Cholesterol Calculated 63 <100 mg/dL    Non HDL Cholesterol 77 <130 mg/dL    Patient Fasting > 8hrs? Unknown     Narrative    Cholesterol  Desirable: < 200 mg/dL  Borderline High: 200 - 239 mg/dL  High: >= 240 mg/dL    Triglycerides  Normal: < 150 mg/dL  Borderline High: 150 - 199 mg/dL  High: 200-499 mg/dL  Very High: >= 500 mg/dL    Direct Measure HDL  Female: >= 50 mg/dL   Male: >= 40 mg/dL    LDL Cholesterol  Desirable: < 100 mg/dL  Above Desirable: 100 - 129 mg/dL   Borderline High: 130 - 159 mg/dL   High:  160 - 189 mg/dL   Very High: >= 190 mg/dL    Non HDL Cholesterol  Desirable: < 130 mg/dL  Above Desirable: 130 - 159 mg/dL  Borderline High: 160 - 189 mg/dL  High: 190 - 219 mg/dL  Very High: >= 220 mg/dL   CBC with platelets   Result Value Ref Range    WBC Count 6.6 4.0 - 11.0 10e3/uL    RBC Count 4.22 3.80 - 5.20 10e6/uL    Hemoglobin 14.0 11.7 - 15.7 g/dL    Hematocrit 41.7 35.0 - 47.0 %    MCV 99 78 - 100 fL    MCH 33.2 (H) 26.5 - 33.0 pg    MCHC 33.6 31.5 - 36.5 g/dL    RDW 13.6 10.0 - 15.0 %    Platelet Count 239 150 - 450 10e3/uL   Phenytoin level   Result Value Ref Range    Phenytoin 15.4   ug/mL      Comment:      Therapeutic Range: 10.0-20.0 ug/mL  Critical: Greater than 30.0 ug/mL   Comprehensive metabolic panel   Result Value Ref Range    Sodium 139 135 - 145 mmol/L    Potassium 4.3 3.4 - 5.3 mmol/L    Carbon Dioxide (CO2) 29 22 - 29 mmol/L    Anion Gap 9 7 - 15 mmol/L    Urea Nitrogen 18.8 8.0 - 23.0 mg/dL     Creatinine 1.07 (H) 0.51 - 0.95 mg/dL    GFR Estimate 53 (L) >60 mL/min/1.73m2      Comment:      eGFR calculated using 2021 CKD-EPI equation.    Calcium 10.1 8.8 - 10.4 mg/dL      Comment:      Reference intervals for this test were updated on 7/16/2024 to reflect our healthy population more accurately. There may be differences in the flagging of prior results with similar values performed with this method. Those prior results can be interpreted in the context of the updated reference intervals.    Chloride 101 98 - 107 mmol/L    Glucose 97 70 - 99 mg/dL    Alkaline Phosphatase 102 40 - 150 U/L    AST 27 0 - 45 U/L    ALT 15 0 - 50 U/L    Protein Total 7.1 6.4 - 8.3 g/dL    Albumin 4.4 3.5 - 5.2 g/dL    Bilirubin Total 0.2 <=1.2 mg/dL    Patient Fasting > 8hrs? Unknown    Bilirubin direct   Result Value Ref Range    Bilirubin Direct <0.20 0.00 - 0.30 mg/dL       If you have any questions or concerns, please call the clinic at the number listed above.       Sincerely,      Luz Cedillo NP

## 2024-09-16 NOTE — PROGRESS NOTES
"  Assessment & Plan   Problem List Items Addressed This Visit       Essential hypertension - Primary     Other Visit Diagnoses       Need for shingles vaccine        Need for Tdap vaccination              -A shingles and tetanus vaccine was recommended through her pharmacy         Nicotine/Tobacco Cessation  She reports that she has been smoking cigarettes. She started smoking about 50 years ago. She has a 25.4 pack-year smoking history. She uses smokeless tobacco.  Nicotine/Tobacco Cessation Plan  Pharmacotherapies : varenicline (Chantix)      Subjective   Kimberly is a 79 year old, presenting for the following health issues:  Follow Up (6 months) and Back Pain        9/16/2024     2:09 PM   Additional Questions   Roomed by [facility]     Back Pain      She does not yet have equipment for KARINA.     Her back has been bothering her.     Tobacco- She is smoking 5 cigarettes per day, down from 25. She tends to smoke after meals. She is taking Chantix and this his helped with cravings. She started taking it 1 month. She feels more crabby since she has cut back on tobacco use.           Objective    /52   Pulse 87   Temp 98.3  F (36.8  C) (Oral)   Resp 20   Ht 1.562 m (5' 1.5\")   Wt 58.5 kg (129 lb)   LMP  (LMP Unknown)   SpO2 95%   BMI 23.98 kg/m    Body mass index is 23.98 kg/m .    Physical Exam   GENERAL: alert and no distress  RESP: lungs clear to auscultation - no rales, rhonchi or wheezes  CV: regular rate and rhythm, normal S1 S2  ABDOMEN: soft, nontender, normal bowel sounds           The longitudinal plan of care for the diagnosis(es)/condition(s) as documented were addressed during this visit. Due to the added complexity in care, I will continue to support Kimberly in the subsequent management and with ongoing continuity of care.  Signed Electronically by: Luz Cedillo NP    "

## 2024-09-18 RX ORDER — ATORVASTATIN CALCIUM 80 MG/1
80 TABLET, FILM COATED ORAL DAILY
Qty: 90 TABLET | Refills: 3 | Status: SHIPPED | OUTPATIENT
Start: 2024-09-18 | End: 2024-09-18

## 2024-09-18 RX ORDER — ATORVASTATIN CALCIUM 40 MG/1
40 TABLET, FILM COATED ORAL DAILY
Qty: 90 TABLET | Refills: 3 | Status: SHIPPED | OUTPATIENT
Start: 2024-09-18

## 2024-09-18 NOTE — ASSESSMENT & PLAN NOTE
Tobacco use has been strongly recommended and she did start Chantix which has resulted in a reduction in cigarette intake from 25 down to 5 now.  We discussed strategies to eliminate the final few cigarettes but she is congratulated on her progress.  -Continue Chantix  -She is now age 79 so she has aged out of routine screening for lung cancer.  She is not experiencing any new symptoms that would suggest lung cancer and does not have any prior history of lung nodules.

## 2024-09-18 NOTE — ASSESSMENT & PLAN NOTE
We reviewed the results of her sleep study which showed severe obstructive sleep apnea.  She also did not recall that she had Pap titration completed.  She is advised to call the sleep clinic since she does not seem to have a CPAP device so that she can get this therapy started.

## 2024-09-24 ENCOUNTER — TELEPHONE (OUTPATIENT)
Dept: VASCULAR SURGERY | Facility: CLINIC | Age: 79
End: 2024-09-24
Payer: COMMERCIAL

## 2024-09-24 RX ORDER — FLUMAZENIL 0.1 MG/ML
0.2 INJECTION, SOLUTION INTRAVENOUS
Status: DISCONTINUED | OUTPATIENT
Start: 2024-09-24 | End: 2024-09-26 | Stop reason: HOSPADM

## 2024-09-24 RX ORDER — NALOXONE HYDROCHLORIDE 0.4 MG/ML
0.2 INJECTION, SOLUTION INTRAMUSCULAR; INTRAVENOUS; SUBCUTANEOUS
Status: DISCONTINUED | OUTPATIENT
Start: 2024-09-24 | End: 2024-09-26 | Stop reason: HOSPADM

## 2024-09-24 RX ORDER — HEPARIN SODIUM 200 [USP'U]/100ML
1 INJECTION, SOLUTION INTRAVENOUS EVERY 5 MIN PRN
Status: DISCONTINUED | OUTPATIENT
Start: 2024-09-24 | End: 2024-09-26 | Stop reason: HOSPADM

## 2024-09-24 RX ORDER — NALOXONE HYDROCHLORIDE 0.4 MG/ML
0.4 INJECTION, SOLUTION INTRAMUSCULAR; INTRAVENOUS; SUBCUTANEOUS
Status: DISCONTINUED | OUTPATIENT
Start: 2024-09-24 | End: 2024-09-26 | Stop reason: HOSPADM

## 2024-09-24 RX ORDER — ONDANSETRON 2 MG/ML
4 INJECTION INTRAMUSCULAR; INTRAVENOUS
Status: DISCONTINUED | OUTPATIENT
Start: 2024-09-24 | End: 2024-09-26 | Stop reason: HOSPADM

## 2024-09-24 RX ORDER — FENTANYL CITRATE 50 UG/ML
25-50 INJECTION, SOLUTION INTRAMUSCULAR; INTRAVENOUS EVERY 5 MIN PRN
Status: DISCONTINUED | OUTPATIENT
Start: 2024-09-24 | End: 2024-09-26 | Stop reason: HOSPADM

## 2024-09-24 NOTE — TELEPHONE ENCOUNTER
Procedure: RLE angiogram    Date: 9/25/24    Surgeon: Dr. Lanette Butt    Called patient to review upcoming procedure. Reviewed visitor allowance of 1 person at this time.     Discussed procedure with patients and instructions: Yes    Reviewed Post Procedure Follow up plan and Appts made: Yes    Reviewed Covid Test Scheduled/Completed: NA    Reviewed Blood Thinners and plan for holding, continuing and/or bridging:Aspirin- Please continue taking. You do NOT need to hold this prior to your procedure.     Reviewed Pre Op Appointment Required and Completed: N/A    Reviewed Allergies and if Contrast Allergy-Instructions and plan: Yes: No contrast allergy    Reviewed Arrival Time of 7am and procedure time of 8am and location of procedure [facility]:  [address] [place] [address] (phone: [phone], Fax: [phone])    Please park in [address]. Enter through the main entrance. Check in at the Welcome Desk and you will be directed to the surgery unit.         All questions answered and number provided if any further questions arise or changes in patient status.

## 2024-09-25 ENCOUNTER — TELEPHONE (OUTPATIENT)
Dept: VASCULAR SURGERY | Facility: CLINIC | Age: 79
End: 2024-09-25

## 2024-09-25 ENCOUNTER — HOSPITAL ENCOUNTER (OUTPATIENT)
Dept: INTERVENTIONAL RADIOLOGY/VASCULAR | Facility: HOSPITAL | Age: 79
Discharge: HOME | End: 2024-09-25
Attending: SURGERY
Payer: COMMERCIAL

## 2024-09-25 VITALS
OXYGEN SATURATION: 98 % | RESPIRATION RATE: 16 BRPM | SYSTOLIC BLOOD PRESSURE: 140 MMHG | TEMPERATURE: 97.9 F | HEART RATE: 87 BPM | DIASTOLIC BLOOD PRESSURE: 63 MMHG

## 2024-09-25 DIAGNOSIS — I70.211 ATHEROSCLEROSIS OF NATIVE ARTERIES OF EXTREMITIES WITH INTERMITTENT CLAUDICATION, RIGHT LEG (H): ICD-10-CM

## 2024-09-25 DIAGNOSIS — I73.9 PAD (PERIPHERAL ARTERY DISEASE) (H): ICD-10-CM

## 2024-09-25 LAB
ANION GAP SERPL CALCULATED.3IONS-SCNC: 8 MMOL/L (ref 7–15)
BUN SERPL-MCNC: 15.3 MG/DL (ref 8–23)
CALCIUM SERPL-MCNC: 9.8 MG/DL (ref 8.8–10.4)
CHLORIDE SERPL-SCNC: 102 MMOL/L (ref 98–107)
CREAT SERPL-MCNC: 0.9 MG/DL (ref 0.51–0.95)
EGFRCR SERPLBLD CKD-EPI 2021: 65 ML/MIN/1.73M2
GLUCOSE SERPL-MCNC: 97 MG/DL (ref 70–99)
HCO3 SERPL-SCNC: 29 MMOL/L (ref 22–29)
POTASSIUM SERPL-SCNC: 4 MMOL/L (ref 3.4–5.3)
SODIUM SERPL-SCNC: 139 MMOL/L (ref 135–145)

## 2024-09-25 PROCEDURE — 999N000127 HC STATISTIC PERIPHERAL IV START W US GUIDANCE

## 2024-09-25 PROCEDURE — 36415 COLL VENOUS BLD VENIPUNCTURE: CPT | Performed by: SURGERY

## 2024-09-25 PROCEDURE — C1769 GUIDE WIRE: HCPCS

## 2024-09-25 PROCEDURE — 255N000002 HC RX 255 OP 636: Performed by: SURGERY

## 2024-09-25 PROCEDURE — 272N000500 HC NEEDLE CR2

## 2024-09-25 PROCEDURE — 75625 CONTRAST EXAM ABDOMINL AORTA: CPT

## 2024-09-25 PROCEDURE — 75710 ARTERY X-RAYS ARM/LEG: CPT | Mod: RT

## 2024-09-25 PROCEDURE — 250N000009 HC RX 250: Performed by: SURGERY

## 2024-09-25 PROCEDURE — C1887 CATHETER, GUIDING: HCPCS

## 2024-09-25 PROCEDURE — 99152 MOD SED SAME PHYS/QHP 5/>YRS: CPT

## 2024-09-25 PROCEDURE — 250N000011 HC RX IP 250 OP 636: Performed by: SURGERY

## 2024-09-25 PROCEDURE — 36246 INS CATH ABD/L-EXT ART 2ND: CPT

## 2024-09-25 PROCEDURE — 76937 US GUIDE VASCULAR ACCESS: CPT

## 2024-09-25 PROCEDURE — 999N000285 HC STATISTIC VASC ACCESS LAB DRAW WITH PIV START

## 2024-09-25 PROCEDURE — C1760 CLOSURE DEV, VASC: HCPCS

## 2024-09-25 PROCEDURE — 250N000011 HC RX IP 250 OP 636: Performed by: STUDENT IN AN ORGANIZED HEALTH CARE EDUCATION/TRAINING PROGRAM

## 2024-09-25 PROCEDURE — 80048 BASIC METABOLIC PNL TOTAL CA: CPT | Performed by: SURGERY

## 2024-09-25 PROCEDURE — 272N000566 HC SHEATH CR3

## 2024-09-25 RX ORDER — DEXTROSE MONOHYDRATE 25 G/50ML
25-50 INJECTION, SOLUTION INTRAVENOUS
Status: DISCONTINUED | OUTPATIENT
Start: 2024-09-25 | End: 2024-09-26 | Stop reason: HOSPADM

## 2024-09-25 RX ORDER — HEPARIN SODIUM 1000 [USP'U]/ML
5000 INJECTION, SOLUTION INTRAVENOUS; SUBCUTANEOUS ONCE
Status: COMPLETED | OUTPATIENT
Start: 2024-09-25 | End: 2024-09-25

## 2024-09-25 RX ORDER — PROTAMINE SULFATE 10 MG/ML
30 INJECTION, SOLUTION INTRAVENOUS ONCE
Status: COMPLETED | OUTPATIENT
Start: 2024-09-25 | End: 2024-09-25

## 2024-09-25 RX ORDER — IODIXANOL 320 MG/ML
100 INJECTION, SOLUTION INTRAVASCULAR ONCE
Status: COMPLETED | OUTPATIENT
Start: 2024-09-25 | End: 2024-09-25

## 2024-09-25 RX ORDER — SODIUM CHLORIDE 9 MG/ML
INJECTION, SOLUTION INTRAVENOUS CONTINUOUS
Status: DISCONTINUED | OUTPATIENT
Start: 2024-09-25 | End: 2024-09-26 | Stop reason: HOSPADM

## 2024-09-25 RX ORDER — ACETAMINOPHEN 500 MG
500-1000 TABLET ORAL EVERY 6 HOURS PRN
COMMUNITY
Start: 2024-09-25

## 2024-09-25 RX ORDER — OXYCODONE HYDROCHLORIDE 5 MG/1
5 TABLET ORAL EVERY 4 HOURS PRN
Status: CANCELLED | OUTPATIENT
Start: 2024-09-25

## 2024-09-25 RX ORDER — NICOTINE POLACRILEX 4 MG
15-30 LOZENGE BUCCAL
Status: DISCONTINUED | OUTPATIENT
Start: 2024-09-25 | End: 2024-09-26 | Stop reason: HOSPADM

## 2024-09-25 RX ORDER — ACETAMINOPHEN 325 MG/1
650 TABLET ORAL EVERY 6 HOURS PRN
Status: CANCELLED | OUTPATIENT
Start: 2024-09-25

## 2024-09-25 RX ORDER — SODIUM CHLORIDE 9 MG/ML
INJECTION, SOLUTION INTRAVENOUS CONTINUOUS
Status: CANCELLED | OUTPATIENT
Start: 2024-09-25

## 2024-09-25 RX ORDER — LIDOCAINE 40 MG/G
CREAM TOPICAL
Status: DISCONTINUED | OUTPATIENT
Start: 2024-09-25 | End: 2024-09-26 | Stop reason: HOSPADM

## 2024-09-25 RX ADMIN — HEPARIN SODIUM 5000 UNITS: 1000 INJECTION INTRAVENOUS; SUBCUTANEOUS at 08:36

## 2024-09-25 RX ADMIN — PROTAMINE SULFATE 30 MG: 10 INJECTION, SOLUTION INTRAVENOUS at 08:43

## 2024-09-25 RX ADMIN — MIDAZOLAM HYDROCHLORIDE 0.5 MG: 1 INJECTION, SOLUTION INTRAMUSCULAR; INTRAVENOUS at 08:36

## 2024-09-25 RX ADMIN — LIDOCAINE HYDROCHLORIDE 30 ML: 10 INJECTION, SOLUTION INFILTRATION; PERINEURAL at 08:25

## 2024-09-25 RX ADMIN — IODIXANOL 45 ML: 320 INJECTION, SOLUTION INTRAVASCULAR at 08:48

## 2024-09-25 RX ADMIN — MIDAZOLAM HYDROCHLORIDE 1 MG: 1 INJECTION, SOLUTION INTRAMUSCULAR; INTRAVENOUS at 08:21

## 2024-09-25 RX ADMIN — HEPARIN SODIUM IN SODIUM CHLORIDE 4 BAG: 200 INJECTION INTRAVENOUS at 08:14

## 2024-09-25 RX ADMIN — FENTANYL CITRATE 50 MCG: 50 INJECTION, SOLUTION INTRAMUSCULAR; INTRAVENOUS at 08:23

## 2024-09-25 NOTE — OP NOTE
VASCULAR SURGERY ANGIOGRAM REPORT      [facility]    DATE: 09/25/24      PROCEDURES PERFORMED:     1.  Ultrasound-guided access of left common femoral artery  2.  Selective cannulation of the right common iliac artery, external iliac artery, and common femoral artery with nonselective left lower extremity angiogram  3.  Moderate sedation  4.  Arteriotomy closure with 6 Fr angioseal device      SURGEON: Lanette Butt MD RPVI  RESIDENT: Mikel Valdez MD    INDICATION:   Ms. Kimberly Hernandez is a 79-year-old female smoker with lifestyle limiting right leg claudication.  She previously underwent right superficial femoral artery stent placement approximately 1 year ago.  The stents have now occluded and she is redeveloped lifestyle limiting right lower extremity claudication.  She now presented for elective right leg angiogram with possible intervention.  After discussion of risk and benefits she elected to proceed.    SEDATION:     The procedure was performed with administration of intravenous conscious sedation with appropriate preoperative, intraoperative, and postoperative evaluation.   21 minutes of supervised face to face intraservice time was provided by a radiology nurse under my direct supervision.     ANTIBIOTICS: None  FLUOROSCOPIC TIME: 3.3 min   RADIATION DOSE: 86 mGy  CONTRAST: 45 mL visipaque  Heparin: 5,000 units  Protamine: 30 mg       PROCEDURE:     Ultrasound was used to evaluate the left common femoral artery. The selected vessel was patent. Ultrasound was then used for real-time ultrasound guided needle entry of the  left common femoral artery. Permanent images were recorded and saved to the patient's medical record.  Micropuncture sheath was inserted.  A glide advantage wire was advanced into the aorta.  5 Swedish sheath was placed.  An Omni Flush catheter was advanced over the wire. Using up and over maneuver I was able to advance the wire into the right femoral artery.  Over the wire the  Omni Flush catheter was advanced and positioned in the  right common femoral artery.  Right lower extremity angiogram was performed.  At that point the procedure was concluded.  The arteriotomy closure was performed using 6 Fr angioseal device.  Patient tolerated procedure well and was transferred to recovery area in stable condition.     FINDINGS   Widely patent right common femoral artery and right profunda femoris arteries.  The proximal right superficial femoral artery is patent and occludes at the most proximal stent.  Right superficial femoral artery reconstitutes at the above-knee popliteal artery  Mild stenosis in the P1 and P2 segments of the right popliteal artery.  Two-vessel runoff via the right anterior tibial and peroneal arteries to the ankle.  Tibioperoneal trunk is patent.  Right peroneal artery is becomes diminutive just above the ankle.  The right posterior tibial artery is occluded.    IMPRESSION   Occluded right SFA stents, patent proximal segment of the right SFA.  Given that she is still smoking, and is generally small caliber native vessels, any SFA intervention for secondary SFA stent patency would have an exceedingly low durability.  She will continue aspirin and high-dose statin therapy, and will follow-up in clinic.  If she develops rest pain or tissue loss, she would likely require a right common femoral to above-knee popliteal artery bypass with nonautologous conduit, as she has an adequate GSV in either leg.

## 2024-09-25 NOTE — IR NOTE
Patient Name: Kimberly Hernandez  Medical Record Number: [id number]  Today's Date: 9/25/2024    Procedure: Angiogram  Proceduralist: Dr Valdez    Procedure Start: 0824  Procedure end: 0845  Sedation medications administered: 1.5 mg midazolam and 50 mcg fentanyl   Sedation time: 21 minutes    Angioseal lot [id number]

## 2024-09-25 NOTE — TELEPHONE ENCOUNTER
Spoke with patient.  Had angio today.  Confirmed all of her follow up appointments with her.  Moved her follow up on 11/11 to earlier in the day so she can make her flight.  Ok to fly at that point.  After a lengthy conversation patient mentioned her angio was unsuccessful today. Note from angio today is not done and unsure of plan.  Will need to update patient once plan is made.

## 2024-09-25 NOTE — PRE-PROCEDURE
GENERAL PRE-PROCEDURE:   Procedure:  Right leg angiogram with possible intervention and moderate sedation  Date/Time:  9/25/2024 7:56 AM    Verbal consent obtained?: Yes    Written consent obtained?: Yes    Risks and benefits: Risks, benefits and alternatives were discussed    Consent given by:  Patient  Patient states understanding of procedure being performed: Yes    Patient's understanding of procedure matches consent: Yes    Procedure consent matches procedure scheduled: Yes    Expected level of sedation:  Moderate  Appropriately NPO:  Yes  ASA Class:  3  Mallampati  :  Grade 3- soft palate visible, posterior pharyngeal wall not visible  Lungs:  Lungs clear with good breath sounds bilaterally  Heart:  Normal heart sounds and rate  History & Physical reviewed:  History and physical reviewed and no updates needed  Statement of review:  I have reviewed the lab findings, diagnostic data, medications, and the plan for sedation    Pre-op pulse exam  Palpable bilateral femoral pulses. Monophasic right DP signal, absent right peroneal and PT doppler signals at the ankle.  Biphasic left PT signal, monophasic left DP signal.

## 2024-09-25 NOTE — DISCHARGE INSTRUCTIONS
Angiogram Discharge Instructions:    You had an angiogram procedure. An angiogram is a procedure thatuses x-rays to take pictures of your blood vessels. A long, flexible tube or catheter is inserted through the blood stream (through the procedure site) to help deliver contrast (dye) into the arteries so they can be visibleon the x-ray. Angiograms are used to evaluate and treat possible blockages or other disease in the arterial system. Please follow the below instructions after your angiogram, including monitoring of your procedure site.    Care instructions after angiogram procedure:    - If you received sedation for your procedure, do not drive or operate heavy machinery for the rest of the day.    - Do not lift objects greater than 10 poundsfor 2 days following angiogram procedure.    - Avoid excessive exercise and straining for 2 days.    - Avoid tub baths, pools, hot tubs and Jacuzzis for 3 days or until procedure site is well healed.    - Youmay shower beginning tomorrow. Do not scrub procedure site until well healed; pat dry.    - Return to your normal activities as you tolerate after the 2 day restriction.    - You can expect to return to work 1-2days after your procedure - depending on the nature of your profession.    - It is normal to have some tenderness and minimal swelling at procedure puncture site. A small area of discoloration may be present.Tenderness typically subsides in 1-2 days. A small knot may also be present at puncture site for 6-8 weeks. This can be a normal part of the healing process.    Medications and other post-procedure care:    -If you had a blockage opened please make sure to fill your prescription for any new medication, such as Plavix, that you may have been prescribed and take it everyday    - If you have kidney function issues, please makesure to hydrate yourself well for the next two days by drinking lots of fluids to help clear your body of the dye used. If you have heart  problems such as heart failure, this may have to be moderated.    - If you are onMetformin, please do not resume it for 48hrs.    Follow up:    - Follow up with your vascular surgery team at the [facility] vascular center A follow up appointment should already be arranged for you.If you are unsure of your appointment or do not have a follow up appointment in the next 2-3 weeks, please call our office at [phone]. You will need to have an ultrasound 2-3 weeks after your angiogram and should bescheduled at the time of your follow up appt.    Further follow up will be based on ultrasound results. Typical follow up is every 3 months for the first year, then every 6 months to one year thereafter.    seek medical evaluation for:    - If you develop fevers (greater than 101 F (38.3C)).    - If you develop increasing pain, redness, purulent drainage, tenderness, or swelling at procedure site.    If you experience any bleeding from procedure/puncture site: lie down, firmly apply pressure to puncture site and call 911.    - Seek emergent evaluation if you experience any new leg/arm pain, discoloration ornumbness.    Call the vascular center at [phone] with questions/concerns or if you have any of the above symptoms.

## 2024-09-25 NOTE — TELEPHONE ENCOUNTER
"Caller: Patient    Provider: MD Lanette Butt    Detailed reason for call: Patient is planning on flying to [place] and will be gone 11/11-15; she is wondering when she should schedule her second post-op appointment and if it is OK to travel since she had angio this morning. She also has an \"around the world cruise\" planned in January and would like to make sure this is OK as well.     Best phone number to contact: [phone]    Best time to contact: Any time    Ok to leave a detailed message: Yes    Ok to speak to authorized person if needed: No      (Noted to patient if reason is related to wound or incision, to please send a photo via email or [employer].)     "

## 2024-09-26 NOTE — TELEPHONE ENCOUNTER
Confirmed with Dr. Butt that he would like to see pt in clinic to discuss surgery.  Pt is scheduled to see Dr. Butt on 10/14/24.  Confirmed appointment with pt.

## 2024-10-10 ENCOUNTER — DOCUMENTATION ONLY (OUTPATIENT)
Dept: RESPIRATORY THERAPY | Facility: CLINIC | Age: 79
End: 2024-10-10
Payer: COMMERCIAL

## 2024-10-10 NOTE — PROGRESS NOTES
TREATMENT PLAN AND GOALS:  PATIENT INSTRUCTED ON USE AND CARE OF THE PAP EQUIPMENT IN ACCORDANCE WITH THE [facility] PRACTICE GUIDELINES.  MACHINE MODEL AND MODE: RESMED  ASV   PRESSURE SETTING OF: EPAP 7, PS MIN-5, PS MAX-20 RATE AUTO  PSG DATE: 9/15/2024  PSG SCORIN%  AHI: 48    1) PATIENT HAS A DX OF CSA, AHI WAS 48 WHICH IS GREATER THAN 5. DURING DIAGNOSTIC STUDY ON 9/15/2022 PATIENT HAD A TOTAL  OBSTRUCTED AND HYPOPNEA WITH 225 CENTRAL EVENTS. THIS IS GREATER THAN 50% OF TOTAL.   2) CPAP WAS TRIALED AND RULED OUT  3) PATIENT HAD AN ECHO ON 2022 WITH A EF OF 60-65%    10/3/2024- BASED ON ABOVE DATA, PATIENT  DOES QUALIFY FOR AN  ASV DEVICE IN TREATMENT OF CENTRAL SLEEP APNEA.     RX SIGNED BY: JAQUELIN  REFERRAL SOURCE: [facility]  DATE RX SIGNED:  10/2/2024  JERONIMO = 99    DATE PATIENT WAS SETUP ON: 10/09/2024  LOCATION OF SETUP: [facility]  SETUP BY: MYRNA    PT CAME IN TO [facility] ACCOMPANIED BY HER BOYFRIEND FOR THE SETUP. SHE HAS NOT BEEN ON A SLEEP DEVICE BEFORE BUT HE USES A CPAP SO SHE IS A BIT FAMILIAR WITH IT. I WENT OVER THE DEVICE, POWER CORD, START/STOP, CONNECTING THE TUBING AND MASK. WE TALKED ABOUT CLEANING THE SUPPLIES, CHANGING THE FILTERS, EMPTYING THE WATER CHAMBER, AND THE SUPPLY SCHEDULE. I FIT HER MASK AND HAD HER WEAR THE MACHINE FOR A FEW MINUTES. SHE DID WANT THE RAMP TIME SET TO 30, AND THE SMART START ON SO I SET THOSE. WE TALKED ABOUT THE COMFORT SETTINGS THAT SHE CAN ADJUST AND I TOLD HER TO CALL IF SHE HAS ANY QUESTIONS ABOUT THOSE. WE DISCUSSED COMPLIANCE AND I GAVE HER THE DATES THAT SHE NEEDS THE FTF. PT HAD NO MORE QUESTIONS BUT SHE ALSO HAS MY DIRECT LINE AND MAIN LINE IF SHE DOES.

## 2024-10-14 ENCOUNTER — OFFICE VISIT (OUTPATIENT)
Dept: VASCULAR SURGERY | Facility: CLINIC | Age: 79
End: 2024-10-14
Attending: SURGERY
Payer: COMMERCIAL

## 2024-10-14 VITALS — OXYGEN SATURATION: 99 % | DIASTOLIC BLOOD PRESSURE: 82 MMHG | SYSTOLIC BLOOD PRESSURE: 158 MMHG | HEART RATE: 75 BPM

## 2024-10-14 DIAGNOSIS — Z72.0 TOBACCO ABUSE: ICD-10-CM

## 2024-10-14 DIAGNOSIS — I73.9 PAD (PERIPHERAL ARTERY DISEASE) (H): ICD-10-CM

## 2024-10-14 DIAGNOSIS — I70.211 ATHEROSCLEROSIS OF NATIVE ARTERIES OF EXTREMITIES WITH INTERMITTENT CLAUDICATION, RIGHT LEG (H): Primary | ICD-10-CM

## 2024-10-14 PROCEDURE — G0463 HOSPITAL OUTPT CLINIC VISIT: HCPCS

## 2024-10-14 PROCEDURE — 99214 OFFICE O/P EST MOD 30 MIN: CPT

## 2024-10-14 RX ORDER — CILOSTAZOL 50 MG/1
TABLET ORAL
Qty: 388 TABLET | Refills: 3 | Status: SHIPPED | OUTPATIENT
Start: 2024-10-14 | End: 2025-02-09

## 2024-10-14 ASSESSMENT — PAIN SCALES - GENERAL: PAINLEVEL: MODERATE PAIN (4)

## 2024-10-14 NOTE — Clinical Note
Follow up in 2 months with Dr. Butt or Dr. Esquivel with ANNA w/exercise and BLE duplex. Pt left before I could give AVS

## 2024-10-14 NOTE — PROGRESS NOTES
Vascular Surgery Clinic Followup Note    LOCATION:  [facility]      Kimberly Hernandez  Medical Record #:  [id number]  YOB: 1945  Age:  79 year old     Date of Service: 10/14/2024         Assessment and Plan:    79 year old female with peripheral artery disease, chronically occluded right SFA stents, and lifestyle-limiting right calf claudication, seen now 1 month s/p diagnostic right leg angiogram with reconstitution of the above-knee popliteal artery and two-vessel runoff via the AT and peroneal.    Unfortunately patient is still smoking.  She remains without chronic limb-threatening ischemia - no tissue loss or rest pain.     We will plan to exhaust medical management of her lifestyle-limiting claudication before considering femoral to above-knee popliteal bypass.  Plan is to start pletal, referred to supervised exercise therapy for structured walking program, and smoking cessation referral. She will follow-up in 3 months with repeat ABIs and arterial duplex ultrasound.    Patient was discussed with staff, Dr. Butt.    30 min spent on encounter    Mikel Valdez MD           Interval History:   Kimberly Hernandez is a 79 year old female with a history of peripheral artery disease with lifestyle limiting right calf claudication and a history of previous right superficial femoral artery stents that are chronically occluded.  She underwent angiogram last month showed long segment of SFA stent occlusion reconstitution of the above-knee popliteal artery and two-vessel runoff to the ankle with generalized small arteries.  Given that chronic-limb threatening ischemia, she is still smoking and the durability of a recanalization of the long stent occlusion of the very limited, we elected to not intervene during the angiogram.  Today she notes her right calf claudication is unchanged and occurs about 200 feet.  She has some pain in the right thigh at rest but denies any rest pain in the  foot, toes, calf.  She denies any lower extremity wounds.         Medications:     Current Outpatient Medications:     cilostazol (PLETAL) 50 MG tablet, Take 1 tablet (50 mg) by mouth daily for 14 days, THEN 1 tablet (50 mg) 2 times daily for 14 days, THEN 2 tablets (100 mg) 2 times daily., Disp: 388 tablet, Rfl: 3    acetaminophen (TYLENOL) 500 MG tablet, Take 1-2 tablets (500-1,000 mg) by mouth every 6 hours as needed for mild pain (for post-procedural left groin pain)., Disp: , Rfl:     amLODIPine (NORVASC) 10 MG tablet, Take 1 tablet (10 mg) by mouth daily., Disp: 90 tablet, Rfl: 1    artificial tears,hypromellose, (PURE & GENTLE) 0.3 % Drop ophthalmic drops, [ARTIFICIAL TEARS,HYPROMELLOSE, (PURE & GENTLE) 0.3 % DROP OPHTHALMIC DROPS] Administer 2 drops to both eyes 4 (four) times a day as needed., Disp: 30 mL, Rfl: 11    aspirin 81 MG EC tablet, Take 1 tablet by mouth daily, Disp: , Rfl:     atorvastatin (LIPITOR) 40 MG tablet, Take 1 tablet (40 mg) by mouth daily., Disp: 90 tablet, Rfl: 3    calcium carbonate (OS-EULALIA) 600 mg (1,500 mg) tablet, [CALCIUM CARBONATE (OS-EULALIA) 600 MG (1,500 MG) TABLET] Take 600 mg by mouth 2 (two) times a day with meals., Disp: , Rfl:     cyclobenzaprine (FLEXERIL) 5 MG tablet, Take 1-2 tablets (5-10 mg) by mouth 3 times daily as needed for muscle spasms, Disp: 20 tablet, Rfl: 1    ERGOCALCIFEROL, VITAMIN D2, (VITAMIN D2 ORAL), [ERGOCALCIFEROL, VITAMIN D2, (VITAMIN D2 ORAL)] Take 2 tablets by mouth daily., Disp: , Rfl:     hydrochlorothiazide (HYDRODIURIL) 25 MG tablet, Take 1 tablet (25 mg) by mouth daily., Disp: 90 tablet, Rfl: 1    HYDROcodone-acetaminophen (NORCO) 5-325 MG tablet, Take 1 tablet by mouth every 8 hours as needed for severe pain., Disp: 20 tablet, Rfl: 0    hydrOXYzine (ATARAX) 25 MG tablet, Take 1-2 tablets (25-50 mg) by mouth every 4 hours as needed for itching, Disp: 30 tablet, Rfl: 0    multivitamin therapeutic (THERAGRAN) tablet, [MULTIVITAMIN THERAPEUTIC  (THERAGRAN) TABLET] Take 1 tablet by mouth daily., Disp: , Rfl:     phenytoin (DILANTIN) 100 MG ER capsule, Take 2 capsules by mouth every morning and 1 capsule every evening, Disp: 270 capsule, Rfl: 1    varenicline (CHANTIX CECILIO) 0.5 MG X 11 & 1 MG X 42 tablet, Take 0.5 mg tab daily for 3 days, THEN 0.5 mg tab twice daily for 4 days, THEN 1 mg twice daily., Disp: 53 tablet, Rfl: 0    varenicline (CHANTIX CECILIO) 0.5 MG X 11 & 1 MG X 42 tablet, Take 0.5 mg tab daily for 3 days, THEN 0.5 mg tab twice daily for 4 days, THEN 1 mg twice daily., Disp: 53 tablet, Rfl: 0         Physical Exam:   BP (!) 158/82   Pulse 75   LMP  (LMP Unknown)   SpO2 99%   Wt Readings from Last 1 Encounters:   09/16/24 129 lb (58.5 kg)     There is no height or weight on file to calculate BMI.    EXAM:  On exam she is sitting comfortably in exam chair  Nonlabored breathing on room air  Right foot is warm, normal color, no dependent rubor  Brisk monophasic right DP Doppler signal, faint monophasic right PT Doppler signal, absent right peroneal Doppler signal at the ankle  There are no right foot or toe wounds          Data:   Imaging:  We reviewed her diagnostic right leg angiogram from 9/25/2024 demonstrated long segment occluded right superficial femoral artery stents with reconstitution of the above-knee popliteal artery and two-vessel runoff via the right anterior tibial and peroneal arteries.  In retrospect the peroneal artery likely occludes above the ankle, as there is no peroneal Doppler signal at the ankle on  my exam today.

## 2024-10-14 NOTE — PROGRESS NOTES
[facility]        Patient is here for a post-op LRE Angiogram 9/25/24. Would like to discuss bypass. Will be out of state Jan-March 2025.     Pt is currently taking Aspirin and Statin.    LMP  (LMP Unknown)     The provider has been notified that the patient has concerns of RLE very painful. Pain radiates. Would like to discuss surgery options .     Questions patient would like addressed today are: N/A.    Refills are needed: N/A    Has homecare services and agency name:  No

## 2024-10-14 NOTE — PATIENT INSTRUCTIONS
Maribel Harris,    Thank you for entrusting your care with us today. After your visit today with Dr. Mikel Valdez this is the plan that was discussed at your appointment.    Try to cut back / quit smoking - a referral was placed for smoking cessation     Dr. Valdez would like you to start taking Pletal. See below for dosing. This was sent to your pharmacy    Follow up in 2 months with ultrasounds and visit with our vascular surgeons - we will contact you to schedule these    4. A referral was placed for PAD rehab- they will contact you to schedule    I am including additional information on these things and our contact information if you have any questions or concerns.   Please do not hesitate to reach out to us if you felt we did not answer your questions or you are unsure of the treatment plan after your visit today. Our number is [phone].Thank you for trusting us with your care.         Again thank you for your time.       We would like to start you on a medication called Pletal or Cilostazol.    We start you on this slowly and go up over the course of 4 weeks.    The first 2 weeks we would like you to take 50 mg once a day for 2 weeks.  Then increase to 50mg twice a day for 2 weeks.  Finally increase to 100mg twice a day thereafter.    Please notify us if you have any problems with nausea, diarrhea or increased blood sugar levels. During this dose titration we would like you to check your blood sugars twice a day if you are diabetic.   Cilostazol Oral tablet  What is this medicine?  CILOSTAZOL (lynne [place] sta zol) is used to treat the symptoms of intermittent claudication. This condition causes pain in the legs during walking, and goes away with rest. By improving blood flow, this medicine helps people with this condition walk longer distances without pain.  This medicine may be used for other purposes; ask your health care provider or pharmacist if you have questions.  What should I tell my health care provider  before I take this medicine?  They need to know if you have any of the following conditions:  bleeding disorder or hemophilia  history of heart failure, heart attack, or other heart disease  an unusual or allergic reaction to cilostazol, other medicines, foods, dyes, or preservatives  pregnant or trying to get pregnant  breast-feeding  How should I use this medicine?  Take this medicine by mouth with a full glass of water. Follow the directions on the prescription label. Take this medicine on an empty stomach, at least 30 minutes before or 2 hours after food. Do not take with food. Take your doses at regular intervals. Do not take your medicine more often than directed.  Talk to your pediatrician regarding the use of this medicine in children. Special care may be needed.  Overdosage: If you think you have taken too much of this medicine contact a poison control center or emergency room at once.  NOTE: This medicine is only for you. Do not share this medicine with others.  What if I miss a dose?  If you miss a dose, take it as soon as you can. If it is almost time for your next dose, take only that dose. Do not take double or extra doses.  What may interact with this medicine?  Do not take this medicine with any of the following medications:  grapefruit juice  This medicine may also interact with the following medications:  agents that prevent or treat blood clots like enoxaparin or warfarin  aspirin  diltiazem  erythromycin or clarithromycin  omeprazole  some medications for treating depression like fluoxetine, fluvoxamine, nefazodone  some medications for treating fungal infections like ketoconazole, fluconazole, itraconazole  This list may not describe all possible interactions. Give your health care provider a list of all the medicines, herbs, non-prescription drugs, or dietary supplements you use. Also tell them if you smoke, drink alcohol, or use illegal drugs. Some items may interact with your medicine.  What  should I watch for while using this medicine?  Visit your doctor or health care professional for regular checks on your progress. It may take 2 to 4 weeks for your condition to start to get better once you begin taking this medicine. In some people, it can take as long as 3 months for the condition to get better.  You may get drowsy or dizzy. Do not drive, use machinery, or do anything that needs mental alertness until you know how this drug affects you. Do not stand or sit up quickly, especially if you are an older patient. This reduces the risk of dizzy or fainting spells. Alcohol can make you more drowsy and dizzy. Avoid alcoholic drinks.  Smoking may have effects on the circulation that may limit the benefits you receive from this medicine. You may wish to discuss how to stop smoking with your doctor or health care professional.  If you are going to have surgery, tell your doctor or health care professional that you are taking this medicine.  What side effects may I notice from receiving this medicine?  Side effects that you should report to your doctor or health care professional as soon as possible:  allergic reactions like skin rash, itching or hives, swelling of the face, lips, or tongue  chest pain  fast, slow, or irregular heartbeat  signs and symptoms of bleeding such as bloody or black, tarry stools; red or dark-brown urine; spitting up blood or brown material that looks like coffee grounds; red spots on the skin; unusual bruising or bleeding from the eye, gums, or nose  swelling in the legs or ankles  Side effects that usually do not require medical attention (report to your doctor or health care professional if they continue or are bothersome):  diarrhea  headache  nausea, or upset stomach  This list may not describe all possible side effects. Call your doctor for medical advice about side effects. You may report side effects to FDA at 1-981-FDA-7561.  Where should I keep my medicine?  Keep out of the  reach of children.  Store at room temperature between 15 and 30 degrees C (59 and 86 degrees F). Throw away any unused medicine after the expiration date.  NOTE:This sheet is a summary. It may not cover all possible information. If you have questions about this medicine, talk to your doctor, pharmacist, or health care provider. Copyright  2015 Gold Standard             Ankle-Brachial Index (ANNA) or Physiologic Test    Description  An ankle-brachial index test is relatively pain free. Blood pressure cuffs of various sizes are placed on your thigh, calf, foot and toes.  Similar to having your blood pressure checked with an arm cuff, as the technician inflates the cuffs, they progressively tighten and are then quickly released.  You may feel some discomfort, but generally for less than 60 seconds for each measurement. You will be asked to remove your socks and shoes and possibly your pants or shorts. Gowns will be provided. It usually takes about 30-60 minutes.   Depending on the initial readings and patient symptoms, you may be asked to perform a light walk on a treadmill.  The technician will apply ultrasound gel, usually warmed for your comfort, to your ankles and wrists. Through the gel, the technician will use a small hand-held device that emits sound waves.  Risks  There are typically no side effects or complications associated with a physiologic study.  How to Prepare  Eat and take medications as usual.  There is no preparation required for an ankle-brachial index (ANNA) or physiologic exam.  What Can I Expect After the Test?  The technician will send the ultrasound images to your vascular surgeon for evaluation. Typically, a report is available in 2-3 days. If anything critical is found, it is standard practice to notify the vascular surgeon immediately.  Reference: https://vascular.org/patient-resources/vascular-tests     Lower Extremity Arterial Ultrasound    Description  Ultrasound examinations are painless  and easy for the patient. The vascular laboratory will contain a bed and just two or three pieces of equipment. You will be asked to remove pants or shorts and gowns will be provided. It usually takes about 30 minutes.  The technician will tuck a towel under your underpants in the groin. The gel is water-soluble and will not stain your skin or clothes.  Ultrasound gel, usually warmed for your comfort, will be placed on the inner side of your legs.    Through the gel, the technician will apply to your legs a small hand-held device that emits sound waves.  When the test is completed, the technician will remove excess gel from your legs.    Risks  There are typically no side effects or complications associated with a lower extremity arterial duplex ultrasound.  How to Prepare  Eat and take medications as usual.  There is no preparation required for a lower extremity arterial duplex ultrasound.  What Can I Expect After the Test?  The technician will send the ultrasound images to your vascular surgeon for evaluation. Typically, a report is available in 2-3 days. If anything critical is found, it is standard practice to notify the vascular surgeon immediately.  Reference: https://vascular.org/patient-resources/vascular-tests

## 2024-10-15 ENCOUNTER — TELEPHONE (OUTPATIENT)
Dept: VASCULAR SURGERY | Facility: CLINIC | Age: 79
End: 2024-10-15
Payer: COMMERCIAL

## 2024-10-15 NOTE — TELEPHONE ENCOUNTER
Spoke with patient, appts scheduled with Dr. Esquivel (pt requested Fri in [facility]).   ______________________________  Juhi Lua, RN  [facility]  Follow up in 2 months with Dr. Butt or Dr. Esquivel with ANNA w/exercise and BLE duplex. Pt left before I could give AVS

## 2024-10-21 ENCOUNTER — INFUSION THERAPY VISIT (OUTPATIENT)
Dept: INFUSION THERAPY | Facility: HOSPITAL | Age: 79
End: 2024-10-21
Attending: NURSE PRACTITIONER
Payer: COMMERCIAL

## 2024-10-21 VITALS
WEIGHT: 128.1 LBS | RESPIRATION RATE: 16 BRPM | DIASTOLIC BLOOD PRESSURE: 71 MMHG | HEART RATE: 83 BPM | TEMPERATURE: 98.2 F | HEIGHT: 64 IN | OXYGEN SATURATION: 96 % | BODY MASS INDEX: 21.87 KG/M2 | SYSTOLIC BLOOD PRESSURE: 162 MMHG

## 2024-10-21 DIAGNOSIS — M85.851 OSTEOPENIA OF BOTH HIPS: Primary | ICD-10-CM

## 2024-10-21 DIAGNOSIS — M85.852 OSTEOPENIA OF BOTH HIPS: Primary | ICD-10-CM

## 2024-10-21 PROCEDURE — 250N000011 HC RX IP 250 OP 636: Performed by: NURSE PRACTITIONER

## 2024-10-21 PROCEDURE — 96365 THER/PROPH/DIAG IV INF INIT: CPT

## 2024-10-21 RX ORDER — ALBUTEROL SULFATE 0.83 MG/ML
2.5 SOLUTION RESPIRATORY (INHALATION)
Status: DISCONTINUED | OUTPATIENT
Start: 2024-10-21 | End: 2024-10-21 | Stop reason: HOSPADM

## 2024-10-21 RX ORDER — MEPERIDINE HYDROCHLORIDE 25 MG/ML
25 INJECTION INTRAMUSCULAR; INTRAVENOUS; SUBCUTANEOUS EVERY 30 MIN PRN
OUTPATIENT
Start: 2025-10-21

## 2024-10-21 RX ORDER — ACETAMINOPHEN 325 MG/1
650 TABLET ORAL
OUTPATIENT
Start: 2025-10-21

## 2024-10-21 RX ORDER — EPINEPHRINE 1 MG/ML
0.3 INJECTION, SOLUTION INTRAMUSCULAR; SUBCUTANEOUS EVERY 5 MIN PRN
OUTPATIENT
Start: 2025-10-21

## 2024-10-21 RX ORDER — ALBUTEROL SULFATE 90 UG/1
1-2 INHALANT RESPIRATORY (INHALATION)
Start: 2025-10-21

## 2024-10-21 RX ORDER — EPINEPHRINE 1 MG/ML
0.3 INJECTION, SOLUTION INTRAMUSCULAR; SUBCUTANEOUS EVERY 5 MIN PRN
Status: DISCONTINUED | OUTPATIENT
Start: 2024-10-21 | End: 2024-10-21 | Stop reason: HOSPADM

## 2024-10-21 RX ORDER — ZOLEDRONIC ACID 0.05 MG/ML
5 INJECTION, SOLUTION INTRAVENOUS ONCE
Start: 2025-10-21

## 2024-10-21 RX ORDER — DIPHENHYDRAMINE HYDROCHLORIDE 50 MG/ML
50 INJECTION INTRAMUSCULAR; INTRAVENOUS
Start: 2025-10-21

## 2024-10-21 RX ORDER — ZOLEDRONIC ACID 0.05 MG/ML
5 INJECTION, SOLUTION INTRAVENOUS ONCE
Status: COMPLETED | OUTPATIENT
Start: 2024-10-21 | End: 2024-10-21

## 2024-10-21 RX ORDER — HEPARIN SODIUM (PORCINE) LOCK FLUSH IV SOLN 100 UNIT/ML 100 UNIT/ML
5 SOLUTION INTRAVENOUS
OUTPATIENT
Start: 2025-10-21

## 2024-10-21 RX ORDER — METHYLPREDNISOLONE SODIUM SUCCINATE 125 MG/2ML
125 INJECTION INTRAMUSCULAR; INTRAVENOUS
Start: 2025-10-21

## 2024-10-21 RX ORDER — METHYLPREDNISOLONE SODIUM SUCCINATE 125 MG/2ML
125 INJECTION INTRAMUSCULAR; INTRAVENOUS
Status: DISCONTINUED | OUTPATIENT
Start: 2024-10-21 | End: 2024-10-21 | Stop reason: HOSPADM

## 2024-10-21 RX ORDER — DIPHENHYDRAMINE HYDROCHLORIDE 50 MG/ML
50 INJECTION INTRAMUSCULAR; INTRAVENOUS
Status: DISCONTINUED | OUTPATIENT
Start: 2024-10-21 | End: 2024-10-21 | Stop reason: HOSPADM

## 2024-10-21 RX ORDER — ALBUTEROL SULFATE 90 UG/1
1-2 INHALANT RESPIRATORY (INHALATION)
Status: DISCONTINUED | OUTPATIENT
Start: 2024-10-21 | End: 2024-10-21 | Stop reason: HOSPADM

## 2024-10-21 RX ORDER — HEPARIN SODIUM,PORCINE 10 UNIT/ML
5-20 VIAL (ML) INTRAVENOUS DAILY PRN
OUTPATIENT
Start: 2025-10-21

## 2024-10-21 RX ORDER — MEPERIDINE HYDROCHLORIDE 25 MG/ML
25 INJECTION INTRAMUSCULAR; INTRAVENOUS; SUBCUTANEOUS EVERY 30 MIN PRN
Status: DISCONTINUED | OUTPATIENT
Start: 2024-10-21 | End: 2024-10-21 | Stop reason: HOSPADM

## 2024-10-21 RX ORDER — ALBUTEROL SULFATE 0.83 MG/ML
2.5 SOLUTION RESPIRATORY (INHALATION)
OUTPATIENT
Start: 2025-10-21

## 2024-10-21 RX ADMIN — ZOLEDRONIC ACID 5 MG: 5 INJECTION, SOLUTION INTRAVENOUS at 13:57

## 2024-10-21 NOTE — PROGRESS NOTES
"Infusion Nursing Note:  Kimberly Hernandez presents today for Reclast.    Patient seen by provider today: No   present during visit today: Not Applicable.    Note: Kimberly comes in today for Reclast. Kimberly states that she tolerated the infusions without issues. I went over the plan of care with Kimberly and she verbalized understanding. PIV placed with great blood return throughout. Reclast given per order. Kimberly tolerated with no sign or symptom of a reaction. PIV removed and covered with gauze and coban. Kimberly left ambulatory to the [facility] and plans on returning in a year if the MD orders again.    BP (!) 162/71 (BP Location: Left arm, Patient Position: Sitting)   Pulse 83   Temp 98.2  F (36.8  C) (Oral)   Resp 16   Ht 1.626 m (5' 4\")   Wt 58.1 kg (128 lb 1.6 oz)   LMP  (LMP Unknown)   SpO2 96%   BMI 21.99 kg/m       Administrations This Visit         zoledronic acid (RECLAST) infusion 5 mg Admin Date  10/21/2024 Action  $New Bag             Intravenous Access:  Peripheral IV placed.    Treatment Conditions:  Lab Results   Component Value Date     09/25/2024    POTASSIUM 4.0 09/25/2024    CR 0.90 09/25/2024    EULALIA 9.8 09/25/2024    BILITOTAL 0.2 09/16/2024    ALBUMIN 4.4 09/16/2024    ALT 15 09/16/2024    AST 27 09/16/2024     Results reviewed, labs MET treatment parameters, ok to proceed with treatment.        Post Infusion Assessment:  Patient tolerated infusion without incident.  Site patent and intact, free from redness, edema or discomfort.  No evidence of extravasations.  Access discontinued per protocol.       Discharge Plan:   Discharge instructions reviewed with: Patient.  Patient and/or family verbalized understanding of discharge instructions and all questions answered.  Copy of AVS reviewed with patient and/or family.  Patient will return in one year for next appointment.      Tanna Lee RN    "

## 2024-11-12 ENCOUNTER — TELEPHONE (OUTPATIENT)
Dept: SLEEP MEDICINE | Facility: CLINIC | Age: 79
End: 2024-11-12
Payer: COMMERCIAL

## 2024-11-14 NOTE — TELEPHONE ENCOUNTER
Spoke with patient. She misunderstood her upcoming appts in December, she thought it was a surgery or procedure.  Explained the appts are for ultrasound and to see provider.  Patient states understanding and is ok with appts remaining the same.

## 2024-11-14 NOTE — TELEPHONE ENCOUNTER
DUNCAN to patient.  She is scheduled to see Dr. Esquivel in December.  Need more information on why patient would like to be seen sooner.

## 2024-12-09 DIAGNOSIS — G40.909 NONINTRACTABLE EPILEPSY WITHOUT STATUS EPILEPTICUS, UNSPECIFIED EPILEPSY TYPE (H): ICD-10-CM

## 2024-12-09 RX ORDER — PHENYTOIN SODIUM 100 MG/1
CAPSULE, EXTENDED RELEASE ORAL
Qty: 270 CAPSULE | Refills: 1 | Status: SHIPPED | OUTPATIENT
Start: 2024-12-09

## 2025-01-07 ENCOUNTER — TELEPHONE (OUTPATIENT)
Dept: INTERNAL MEDICINE | Facility: CLINIC | Age: 80
End: 2025-01-07
Payer: COMMERCIAL

## 2025-01-07 NOTE — TELEPHONE ENCOUNTER
Medication Question or Refill    Contacts       Contact Date/Time Type Contact Phone/Fax    01/07/2025 12:06 PM CST Phone (Incoming) Kimberly Hernandez (Self) [phone] (M)            What medication are you calling about (include dose and sig)?: ALL meds    [facility] Pharmacy:   [id number] [place] [id number]  [address]  [facility] [address]  Phone: [phone] Fax: [phone]      Controlled Substance Agreement on file:   CSA -- Patient Level:    CSA: None found at the patient level.       Who prescribed the medication?: PCP    Do you need a refill? Yes    When did you use the medication last? NA    Patient offered an appointment? No    Do you have any questions or concerns?  Yes: going on a 3 month cruise and wanted to know if she can get a 3 month supply on all meds. Please contact patient.      Okay to leave a detailed message?: Yes at Cell number on file:    Telephone Information:   Mobile [phone]

## 2025-01-14 NOTE — TELEPHONE ENCOUNTER
Called and spoke with patient and scheduled appt with Luz Cedillo to do a med check  and get refills.

## 2025-01-15 ENCOUNTER — OFFICE VISIT (OUTPATIENT)
Dept: INTERNAL MEDICINE | Facility: CLINIC | Age: 80
End: 2025-01-15
Payer: COMMERCIAL

## 2025-01-15 VITALS
WEIGHT: 126.6 LBS | RESPIRATION RATE: 16 BRPM | HEIGHT: 61 IN | OXYGEN SATURATION: 98 % | SYSTOLIC BLOOD PRESSURE: 142 MMHG | BODY MASS INDEX: 23.9 KG/M2 | TEMPERATURE: 97.7 F | DIASTOLIC BLOOD PRESSURE: 60 MMHG | HEART RATE: 80 BPM

## 2025-01-15 DIAGNOSIS — G40.909 NONINTRACTABLE EPILEPSY WITHOUT STATUS EPILEPTICUS, UNSPECIFIED EPILEPSY TYPE (H): ICD-10-CM

## 2025-01-15 DIAGNOSIS — I67.1 CEREBRAL ANEURYSM, NONRUPTURED: ICD-10-CM

## 2025-01-15 DIAGNOSIS — I99.9 MILD VASCULAR NEUROCOGNITIVE DISORDER: ICD-10-CM

## 2025-01-15 DIAGNOSIS — E78.2 MIXED HYPERLIPIDEMIA: ICD-10-CM

## 2025-01-15 DIAGNOSIS — M85.852 OSTEOPENIA OF BOTH HIPS: Primary | ICD-10-CM

## 2025-01-15 DIAGNOSIS — I72.0 ANEURYSM OF CAROTID ARTERY: ICD-10-CM

## 2025-01-15 DIAGNOSIS — M85.851 OSTEOPENIA OF BOTH HIPS: Primary | ICD-10-CM

## 2025-01-15 DIAGNOSIS — Z78.0 POST-MENOPAUSAL: ICD-10-CM

## 2025-01-15 DIAGNOSIS — K59.00 CONSTIPATION, UNSPECIFIED CONSTIPATION TYPE: ICD-10-CM

## 2025-01-15 DIAGNOSIS — Z72.0 TOBACCO USE: ICD-10-CM

## 2025-01-15 DIAGNOSIS — F06.70 MILD VASCULAR NEUROCOGNITIVE DISORDER: ICD-10-CM

## 2025-01-15 DIAGNOSIS — I73.9 PAD (PERIPHERAL ARTERY DISEASE): ICD-10-CM

## 2025-01-15 DIAGNOSIS — I10 ESSENTIAL HYPERTENSION: ICD-10-CM

## 2025-01-15 DIAGNOSIS — G47.31 CSA (CENTRAL SLEEP APNEA): ICD-10-CM

## 2025-01-15 PROCEDURE — 91320 SARSCV2 VAC 30MCG TRS-SUC IM: CPT | Performed by: NURSE PRACTITIONER

## 2025-01-15 PROCEDURE — 99214 OFFICE O/P EST MOD 30 MIN: CPT | Performed by: NURSE PRACTITIONER

## 2025-01-15 PROCEDURE — 90480 ADMN SARSCOV2 VAC 1/ONLY CMP: CPT | Performed by: NURSE PRACTITIONER

## 2025-01-15 PROCEDURE — G2211 COMPLEX E/M VISIT ADD ON: HCPCS | Performed by: NURSE PRACTITIONER

## 2025-01-15 RX ORDER — AMLODIPINE BESYLATE 10 MG/1
10 TABLET ORAL DAILY
Qty: 90 TABLET | Refills: 1 | Status: SHIPPED | OUTPATIENT
Start: 2025-01-15

## 2025-01-15 RX ORDER — ATORVASTATIN CALCIUM 40 MG/1
40 TABLET, FILM COATED ORAL DAILY
Qty: 90 TABLET | Refills: 3 | Status: SHIPPED | OUTPATIENT
Start: 2025-01-15

## 2025-01-15 RX ORDER — CHLORTHALIDONE 25 MG/1
25 TABLET ORAL DAILY
Qty: 90 TABLET | Refills: 1 | Status: SHIPPED | OUTPATIENT
Start: 2025-01-15

## 2025-01-15 RX ORDER — PHENYTOIN SODIUM 100 MG/1
CAPSULE, EXTENDED RELEASE ORAL
Qty: 270 CAPSULE | Refills: 1 | Status: SHIPPED | OUTPATIENT
Start: 2025-01-15

## 2025-01-15 RX ORDER — SENNA AND DOCUSATE SODIUM 50; 8.6 MG/1; MG/1
1 TABLET, FILM COATED ORAL AT BEDTIME
Qty: 90 TABLET | Refills: 1 | Status: SHIPPED | OUTPATIENT
Start: 2025-01-15

## 2025-01-15 ASSESSMENT — PAIN SCALES - GENERAL: PAINLEVEL_OUTOF10: EXTREME PAIN (8)

## 2025-01-15 ASSESSMENT — PATIENT HEALTH QUESTIONNAIRE - PHQ9
SUM OF ALL RESPONSES TO PHQ QUESTIONS 1-9: 2
SUM OF ALL RESPONSES TO PHQ QUESTIONS 1-9: 2
10. IF YOU CHECKED OFF ANY PROBLEMS, HOW DIFFICULT HAVE THESE PROBLEMS MADE IT FOR YOU TO DO YOUR WORK, TAKE CARE OF THINGS AT HOME, OR GET ALONG WITH OTHER PEOPLE: NOT DIFFICULT AT ALL

## 2025-01-15 NOTE — PROGRESS NOTES
Assessment & Plan   Problem List Items Addressed This Visit       Hyperlipidemia     LDL 63 9/2024  -Continue atorvastatin 40 mg daily         Relevant Medications    atorvastatin (LIPITOR) 40 MG tablet    Epilepsy And Recurrent Seizures     Stable with phenytoin         Relevant Medications    phenytoin (DILANTIN) 100 MG ER capsule    Osteopenia- Reclast 10/17/19 restarted - Primary     Repeat DEXA scan.  If there is stability or notable improvement, we can likely discontinue Reclast infusions for 3-5 years          Mild vascular neurocognitive disorder     Mild impairments, forgetful. Managing well independently          Cerebral aneurysm, nonruptured- repeat imaging 4/2027 with MRA     Recent CTA shows stability.  Next MRI is due 4/2027         Tobacco use     Continues to smoke cigarettes          Aneurysm of carotid artery     CTA 4/2024 showed stability of existing aneurysms. Repeat 4/2025         Essential hypertension     Blood pressure is elevated today.  -Discontinue hydrochlorothiazide and start chlorthalidone 25 mg daily in its place  -Continue with amlodipine 10 mg daily  -If blood pressure remains elevated at her annual wellness visit in March, we could consider adding lisinopril         Relevant Medications    amLODIPine (NORVASC) 10 MG tablet    chlorthalidone (HYGROTON) 25 MG tablet    PAD (peripheral artery disease)     She is due for a follow-up ultrasound and appointment with vascular clinic.  Encouraged her to schedule an appointment.         Relevant Medications    atorvastatin (LIPITOR) 40 MG tablet    CSA (central sleep apnea)     Upcoming follow-up appointment with sleep medicine in March         Constipation, unspecified constipation type     - Try miralax 1 capful twice daily until you have a sizeable bowel movement. If not having results after 5 days, add senna-docusate daily along with miralax to see if this helps move things along   - After you feel like you have emptied, reduce  "Miralax to 1 capful daily to maintain bowel movement consistency          Relevant Medications    SENNA-docusate sodium (SENNA S) 8.6-50 MG tablet     Other Visit Diagnoses       Post-menopausal        Relevant Orders    DX Bone Density        - Shingles and Tdap vaccine recommended through the pharmacy  -Refills for her medications were sent to the pharmacy with a note stating that she may need to fill early in order to have an adequate supply for her upcoming 2-month trip      Return in about 2 months (around 3/15/2025) for AWV.      Linh Harris is a 79 year old, presenting for the following health issues:  Refill Request (Patient refills on all medication. Patient will be going on a 2 month cruise. ) and Medication Check        1/15/2025    11:12 AM   Additional Questions   Roomed by Vishnu HUDDLESTON MA     History of Present Illness       Reason for visit:  Medication Check and Refills    She eats 2-3 servings of fruits and vegetables daily.She consumes 0 sweetened beverage(s) daily.She exercises with enough effort to increase her heart rate 30 to 60 minutes per day.  She exercises with enough effort to increase her heart rate 5 days per week.   She is taking medications regularly.    H/o PVD with right calf claudication, s/p right superficial femoral artery stents that are chronically occluded.  She is now taking Pletal along with atorvastatin and aspirin.    Constipation- for a couple of months she has had more regular constipation. She has tried Milk of Magnesia but it doesn't help.     HTN- readings at home are around 140/60s.     She is leaving for 2-month cruise [place] the [place].  She needs to have refills of her medications to last her throughout the trip.        Objective    BP (!) 142/60   Pulse 80   Temp 97.7  F (36.5  C) (Oral)   Resp 16   Ht 1.549 m (5' 1\")   Wt 57.4 kg (126 lb 9.6 oz)   LMP  (LMP Unknown)   SpO2 98%   BMI 23.92 kg/m    Body mass index is 23.92 kg/m .  Physical Exam   GENERAL: " alert and no distress  RESP: lungs clear to auscultation - no rales, rhonchi or wheezes  CV: regular rate and rhythm, normal S1 S2  PSYCH: mentation appears normal, affect normal/bright          The longitudinal plan of care for the diagnosis(es)/condition(s) as documented were addressed during this visit. Due to the added complexity in care, I will continue to support Kimberly in the subsequent management and with ongoing continuity of care.  Signed Electronically by: Luz Cedillo NP

## 2025-01-15 NOTE — PATIENT INSTRUCTIONS
- Try miralax 1 capful twice daily until you have a sizeable bowel movement. If not having results after 5 days, add senna-docusate daily along with miralax to see if this helps move things along   - After you feel like you have emptied, reduce to 1 capful daily     - call [phone] to schedule the lung CT for lung cancer screening     - I recommend an RSV vaccine through the pharamcy. Also due for tetanus, shingles

## 2025-01-16 ENCOUNTER — PATIENT OUTREACH (OUTPATIENT)
Dept: CARE COORDINATION | Facility: CLINIC | Age: 80
End: 2025-01-16
Payer: COMMERCIAL

## 2025-01-16 PROBLEM — K59.00 CONSTIPATION, UNSPECIFIED CONSTIPATION TYPE: Status: ACTIVE | Noted: 2025-01-16

## 2025-01-16 PROBLEM — G47.31 CSA (CENTRAL SLEEP APNEA): Status: ACTIVE | Noted: 2024-07-26

## 2025-01-16 NOTE — ASSESSMENT & PLAN NOTE
Repeat DEXA scan.  If there is stability or notable improvement, we can likely discontinue Reclast infusions for 3-5 years

## 2025-01-16 NOTE — ASSESSMENT & PLAN NOTE
Blood pressure is elevated today.  -Discontinue hydrochlorothiazide and start chlorthalidone 25 mg daily in its place  -Continue with amlodipine 10 mg daily  -If blood pressure remains elevated at her annual wellness visit in March, we could consider adding lisinopril

## 2025-01-16 NOTE — ASSESSMENT & PLAN NOTE
- Try miralax 1 capful twice daily until you have a sizeable bowel movement. If not having results after 5 days, add senna-docusate daily along with miralax to see if this helps move things along   - After you feel like you have emptied, reduce Miralax to 1 capful daily to maintain bowel movement consistency

## 2025-01-16 NOTE — ASSESSMENT & PLAN NOTE
She is due for a follow-up ultrasound and appointment with vascular clinic.  Encouraged her to schedule an appointment.

## 2025-02-12 ENCOUNTER — PATIENT OUTREACH (OUTPATIENT)
Dept: CARE COORDINATION | Facility: CLINIC | Age: 80
End: 2025-02-12
Payer: COMMERCIAL

## 2025-02-26 ENCOUNTER — PATIENT OUTREACH (OUTPATIENT)
Dept: CARE COORDINATION | Facility: CLINIC | Age: 80
End: 2025-02-26
Payer: COMMERCIAL

## 2025-03-17 ENCOUNTER — OFFICE VISIT (OUTPATIENT)
Dept: SLEEP MEDICINE | Facility: CLINIC | Age: 80
End: 2025-03-17
Payer: COMMERCIAL

## 2025-03-17 DIAGNOSIS — G47.31 CSA (CENTRAL SLEEP APNEA): Primary | ICD-10-CM

## 2025-04-15 ENCOUNTER — NURSE TRIAGE (OUTPATIENT)
Dept: INTERNAL MEDICINE | Facility: CLINIC | Age: 80
End: 2025-04-15
Payer: COMMERCIAL

## 2025-04-15 NOTE — TELEPHONE ENCOUNTER
Called transferred to writer from central scheduling.  Deedee is calling. She is a friend of patient. No consent to communicate on file. Obtained verbal consent from patient for okay to speak over the phone with Deedee.    The are currently at [facility] in [place].  Pt fell and hit her head.   States that they are at [facility] having coffee  Pt tripped over chair and hit head on wall.  She hit the left side and back of her head.   Paramedics came and she declined being taken by ambulance to the emergency room.   She has some blood on ear and is feeling dizzy and tired.   No cuts that she can see.   She is not able to see or feel any lumps.   Pt is alert an oriented.   Fall happened about 25-35 min ago.   Pt is sore on her head.  She has hx of a plate in her head from previous aneurysm.  She is currently sitting and denies any weakness when standing.   Advised ER for further assessment and evaluation.   Deedee verbalized understanding and agreement.     She will plan to bring pt into [facility] ER.    Reason for Disposition   Patient has a concerning injury to a specific part of the body (e.g., chest, leg, head)   Sounds like a serious injury to the triager    Additional Information   Negative: Major injury from dangerous force (e.g., fall > 10 feet or 3 meters)   Negative: Major bleeding (e.g., actively dripping or spurting) and can't be stopped   Negative: Shock suspected (e.g., cold/pale/clammy skin, too weak to stand)   Negative: Difficult to awaken or acting confused (e.g., disoriented, slurred speech)   Negative: SEVERE weakness (e.g., unable to walk or barely able to walk, requires support) and new-onset or getting worse   Negative: Can't stand (bear weight) or walk and new-onset after fall   Negative: Sounds like a life-threatening emergency to the triager   Negative: Passed out (e.g., fainted, lost consciousness, blacked out and was not responding)   Negative: Weakness of the face, arm or leg on one side of the  body AND new-onset or getting worse   Negative: Dizziness described as spinning or off balance (vertigo) AND new-onset or getting worse   Negative: Dizziness described as lightheadedness (no spinning sensation or trouble with balance) AND new-onset or getting worse   Negative: Pregnant and fall   Negative: ACUTE NEURO SYMPTOM and symptom present now (Definition: Difficult to awaken OR confused thinking and talking OR slurred speech OR weakness of arms or legs OR unsteady walking.)   Negative: Knocked out (unconscious) > 1 minute   Negative: Seizure (convulsion) occurred  (Exception: Prior history of seizures and now alert and without Acute Neuro Symptoms.)   Negative: Neck pain after dangerous injury (e.g., MVA, diving, trampoline, contact sports, fall > 10 feet or 3 meters)  (Exception: Neck pain began > 1 hour after injury.)   Negative: Major bleeding (actively dripping or spurting) that can't be stopped   Negative: Penetrating head injury (e.g., knife, gunshot wound, metal object)   Negative: Sounds like a life-threatening emergency to the triager   Negative: Diagnosed with a concussion within last 14 days   Negative: Can't remember what happened (amnesia)   Negative: Vomiting once or more   Negative: Loss of vision or double vision is present now   Negative: Watery or blood-tinged fluid dripping from the nose or ears   Negative: ACUTE NEURO SYMPTOM and now fine  (Definition: Difficult to awaken OR confused thinking and talking OR slurred speech OR weakness of arms or legs OR unsteady walking.)   Negative: Knocked out (unconscious) < 1 minute and now fine   Negative: SEVERE headache   Negative: Dangerous injury (e.g., MVA, diving, trampoline, contact sports, fall > 10 feet or 3 meters) or severe blow from hard object (e.g., golf club or baseball bat)   Negative: Large swelling or bruise (> 2 inches or 5 cm)   Negative: Skin is split open or gaping (length > 1/2 inch or 12 mm)   Negative: Bleeding won't stop after  10 minutes of direct pressure (using correct technique)   Negative: Taking Coumadin (warfarin) or other strong blood thinner, or known bleeding disorder (e.g., thrombocytopenia)   Negative: Age over 64 years with an area of head swelling or bruise    Protocols used: Falls and [id number]-V-NO, Head Injury-A-OH    Leela Childs RN

## 2025-04-29 ENCOUNTER — DOCUMENTATION ONLY (OUTPATIENT)
Dept: SLEEP MEDICINE | Facility: CLINIC | Age: 80
End: 2025-04-29
Payer: COMMERCIAL

## 2025-04-29 NOTE — PROGRESS NOTES
Patient called back and stated everything going well with her sleep machine.  She has no questions or concerns.

## 2025-04-30 ENCOUNTER — OFFICE VISIT (OUTPATIENT)
Dept: SLEEP MEDICINE | Facility: CLINIC | Age: 80
End: 2025-04-30
Payer: COMMERCIAL

## 2025-04-30 VITALS — HEIGHT: 61 IN | BODY MASS INDEX: 24.2 KG/M2 | HEART RATE: 73 BPM | OXYGEN SATURATION: 100 % | WEIGHT: 128.2 LBS

## 2025-04-30 DIAGNOSIS — G47.31 CSA (CENTRAL SLEEP APNEA): Primary | ICD-10-CM

## 2025-04-30 PROCEDURE — 99214 OFFICE O/P EST MOD 30 MIN: CPT

## 2025-04-30 PROCEDURE — 1126F AMNT PAIN NOTED NONE PRSNT: CPT

## 2025-04-30 ASSESSMENT — SLEEP AND FATIGUE QUESTIONNAIRES
HOW LIKELY ARE YOU TO NOD OFF OR FALL ASLEEP WHILE SITTING AND READING: WOULD NEVER DOZE
HOW LIKELY ARE YOU TO NOD OFF OR FALL ASLEEP WHILE SITTING QUIETLY AFTER LUNCH WITHOUT ALCOHOL: WOULD NEVER DOZE
HOW LIKELY ARE YOU TO NOD OFF OR FALL ASLEEP WHILE SITTING AND TALKING TO SOMEONE: WOULD NEVER DOZE
HOW LIKELY ARE YOU TO NOD OFF OR FALL ASLEEP WHILE WATCHING TV: WOULD NEVER DOZE
HOW LIKELY ARE YOU TO NOD OFF OR FALL ASLEEP WHILE LYING DOWN TO REST IN THE AFTERNOON WHEN CIRCUMSTANCES PERMIT: WOULD NEVER DOZE
HOW LIKELY ARE YOU TO NOD OFF OR FALL ASLEEP WHEN YOU ARE A PASSENGER IN A CAR FOR AN HOUR WITHOUT A BREAK: SLIGHT CHANCE OF DOZING
HOW LIKELY ARE YOU TO NOD OFF OR FALL ASLEEP IN A CAR, WHILE STOPPED FOR A FEW MINUTES IN TRAFFIC: WOULD NEVER DOZE
HOW LIKELY ARE YOU TO NOD OFF OR FALL ASLEEP WHILE SITTING INACTIVE IN A PUBLIC PLACE: SLIGHT CHANCE OF DOZING

## 2025-04-30 NOTE — PROGRESS NOTES
Sleep Apnea - Follow-up Visit:    Impression/Plan:  (G47.31) CSA (central sleep apnea) (primary encounter diagnosis)  Comment: Kimberly Hernandez presents for follow-up of her severe central sleep apnea, managed with ASV. She was set up with her machine on 10/29/2024. She is here to document compliance. She has met compliance. She used her machine 72% of days > 4 hours of use. She thinks her friend accidentally may have changed her ordered pressures. Her download shows her apnea is well controlled with a residual AHI of 3.7 events per hour. Her leak is elevated; however, she is overdue for a new cushion.   Plan: Comprehensive DME  Adjusted her settings back to EPAP 7 cm, min PS 0 cm, and max PS 18 cm. A prescription was written for new supplies. We reviewed recommendations for cleaning and replacing supplies.     Kimberly Hernandez will follow up in about 1 year(s).     Total time spent reviewing medical records, history and physical examination, review of previous testing and interpretation as well as documentation on this date: 32 minutes     CC: Luz Cedillo NP    History of Present Illness:  Chief Complaint   Patient presents with    CPAP Follow Up     CPAP follow up,  need supplies     Kimberly Hernandez presents for follow-up of her severe central sleep apnea, managed with ASV. She was set up with her machine on 10/29/2024. She is here to document compliance.      She feels more rested with her ASV machine.     The pressure seems a little heavier now.     They went on a cruise for 2 months. They are going to [place] in September.        She is a retired nurse practitioner.     Ejection fraction on her 2022 echo was 60-65%.     8/20/2024 [facility] Titration Sleep Study (135.0 lbs) - Treatment was titrated to a pressure of ASV 7/0/18 with an AHI of 3.5. Time Spent in REM supine at this pressure was 0 minutes.    9/15/2022 [facility] Diagnostic Sleep Study (125.0 lbs) - AHI 48.1, RDI 48.4, Supine AHI 84.5, REM AHI 6.7, Low O2  86.0%, Time Spent <=88% 4.3 minutes / Time Spent <=89% 13.8 minutes    DME: [facility]    Do you use a CPAP Machine at home: (Patient-Rptd) Yes  Overall, on a scale of 0-10 how would you rate your CPAP (0 poor, 10 great): (Patient-Rptd) 6    What type of mask do you use: full face mask   Is your mask comfortable: (Patient-Rptd) Yes  If not, why:      Is your mask leaking: (Patient-Rptd) No  If yes, where do you feel it:    How many night per week does the mask leak (0-7):      Do you notice snoring with mask on: (Patient-Rptd) No  Do you notice gasping arousals with mask on: (Patient-Rptd) No  Are you having significant oral or nasal dryness: (Patient-Rptd) No  Is the pressure setting comfortable: (Patient-Rptd) No  If not, why:      What is your typical bedtime: (Patient-Rptd) 12  How long does it take you to go to sleep on PAP therapy: (Patient-Rptd) 30 Her boyfriend says she falls asleep within 3 minutes.   What time do you typically get out of bed for the day: (Patient-Rptd) 800  How many hours on average per night are you using PAP therapy: (Patient-Rptd) 7  How many hours are you sleeping per night: (Patient-Rptd) 8  Do you feel well rested in the morning: (Patient-Rptd) Yes    She only wakes in the night if she has to pee.     ResMed AirCurve 10 ASV  ASV with pressures: EPAP 7 cm, min PS 5 cm, max PS 18 cm 32 day usage data: 03/29/2025-04/29/2025  72% of days with > 4 hours of use. 4/32 days with no use.   Average use 5 hours 20 minutes per day.   95%ile Leak 77.7 L/min.   AHI 3.7 events per hour.     EPWORTH SLEEPINESS SCALE         4/30/2025    11:43 AM    [place] Sleepiness Scale ( HALEIGH Sheehan  [id number]<br>ESS - [place]/English - Final version - 21 Nov 07 - [facility] [place].)   Sitting and reading Would never doze   Watching TV Would never doze   Sitting, inactive in a public place (e.g. a theatre or a meeting) Slight chance of dozing   As a passenger in a car for an hour without a break Slight chance of  dozing   Lying down to rest in the afternoon when circumstances permit Would never doze   Sitting and talking to someone Would never doze   Sitting quietly after a lunch without alcohol Would never doze   In a car, while stopped for a few minutes in traffic Would never doze   [place] Score (MC) 2   [place] Score (Sleep) 2        Patient-reported       INSOMNIA SEVERITY INDEX (CLARENCE)          4/30/2025    11:36 AM   Insomnia Severity Index (CLARENCE)   Difficulty falling asleep 2   Difficulty staying asleep 1   Problems waking up too early 1   How SATISFIED/DISSATISFIED are you with your CURRENT sleep pattern? 1   How NOTICEABLE to others do you think your sleep problem is in terms of impairing the quality of your life? 0   How WORRIED/DISTRESSED are you about your current sleep problem? 1   To what extent do you consider your sleep problem to INTERFERE with your daily functioning (e.g. daytime fatigue, mood, ability to function at work/daily chores, concentration, memory, mood, etc.) CURRENTLY? 2   CLARENCE Total Score 8        Patient-reported       Guidelines for Scoring/Interpretation:  Total score categories:  0-7 = No clinically significant insomnia   8-14 = Subthreshold insomnia   15-21 = Clinical insomnia (moderate severity)  22-28 = Clinical insomnia (severe)  Used via courtesy of www.myhealth.va.gov with permission from Jack Guan PhD., [facility]      Past medical/surgical history, family history, social history, medications and allergies were reviewed.        Problem List:  Patient Active Problem List    Diagnosis Date Noted    Constipation, unspecified constipation type 01/16/2025     Priority: Medium    CSA (central sleep apnea) 07/26/2024     Priority: Medium     Sleep study showed severe obstructive sleep apnea.      PAD (peripheral artery disease) 05/08/2023     Priority: Medium     DATE: August 1, 2023    PROCEDURES PERFORMED:     1. Ultrasound-guided access of left common femoral artery  2.  Aortogram  3. Selective cannulation of the right common iliac artery, external iliac artery, and common femoral artery with nonselective right lower extremity angiogram  4. Moderate sedation  5. Arteriotomy closure with Angio-Seal device  6. Balloon angioplasty of the right SFA using 5 mm x 150 mm balloon  7. Placement of a stent into the distal SFA, using 5 mm x 80 mm Misago stent  8. Placement of 5 tack devices within mid SFA  9. Subsequent balloon angioplasty of the right SFA using 5 mm x 80 mm balloon       Seborrheic keratoses 04/11/2023     Priority: Medium    Psoriasis 04/11/2023     Priority: Medium    Essential hypertension 06/03/2021     Priority: Medium    Epilepsy And Recurrent Seizures      Priority: Medium    Aneurysm of carotid artery 03/11/2020     Priority: Medium     Coil embolization on 12/14/2021   Annual CTA for monitoring       Tobacco use 03/10/2020     Priority: Medium    Cerebral aneurysm, nonruptured- repeat imaging 4/2027 with MRA 12/14/2018     Priority: Medium    Mild vascular neurocognitive disorder 12/11/2018     Priority: Medium     Neuropsychology evaluation 7/2022-  DIAGNOSTIC IMPRESSIONS:  Results  Mild impairments in learning with more moderate to severe impairments in verbal memory  Otherwise intact performance on other cognitive measures including attention, language, spatial skills, and several problem-solving measures     Diagnosis  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)  PLAN:  With the exception of verbal memory, she is doing rather well cognitively and there are no concerns with her ability to continue to live independently, drive or manage her finances or personal affairs  She is encouraged to continue to take care of herself physically (medication adherence, healthy diet, regular exercise)  She is encouraged to stay cognitively active  Given current deficits and uncertainty as to whether these cognitive deficits may reflect  "a progressive pattern, re-evaluation in 12-18 minutes is recommended        DNR (do not resuscitate) 04/25/2018     Priority: Medium    Osteopenia- Reclast 10/17/19 restarted 11/29/2016     Priority: Medium     Reclast-   Infusion #1 2016  Infusion #2 2019  Infusion #3 5/2/23  Infusion #4 10/2024      Hyperlipidemia      Priority: Medium    Vitamin D Deficiency      Priority: Medium     Created by Conversion  Replacement Utility updated for latest IMO load        Osteoarthritis Of The Knee      Priority: Medium     Created by Conversion  Replacement Utility updated for latest IMO load    Replacing diagnoses that were inactivated after the 10/1/2021 regulatory import.      Joint Pain, Localized In The Knee      Priority: Medium     Created by Conversion            Pulse 73   Ht 1.549 m (5' 0.98\")   Wt 58.2 kg (128 lb 3.2 oz)   LMP  (LMP Unknown)   SpO2 100%   BMI 24.24 kg/m      JULISSA Carrizales CNP  "

## 2025-05-22 ENCOUNTER — OFFICE VISIT (OUTPATIENT)
Dept: INTERNAL MEDICINE | Facility: CLINIC | Age: 80
End: 2025-05-22
Payer: COMMERCIAL

## 2025-05-22 VITALS
OXYGEN SATURATION: 100 % | WEIGHT: 128 LBS | TEMPERATURE: 97.7 F | RESPIRATION RATE: 12 BRPM | BODY MASS INDEX: 25.13 KG/M2 | HEART RATE: 80 BPM | DIASTOLIC BLOOD PRESSURE: 52 MMHG | HEIGHT: 60 IN | SYSTOLIC BLOOD PRESSURE: 118 MMHG

## 2025-05-22 DIAGNOSIS — R10.32 ABDOMINAL PAIN, LEFT LOWER QUADRANT: Primary | ICD-10-CM

## 2025-05-22 DIAGNOSIS — R93.5 ABNORMAL CT OF THE ABDOMEN: ICD-10-CM

## 2025-05-22 DIAGNOSIS — I10 ESSENTIAL HYPERTENSION: ICD-10-CM

## 2025-05-22 DIAGNOSIS — K59.00 CONSTIPATION, UNSPECIFIED CONSTIPATION TYPE: ICD-10-CM

## 2025-05-22 LAB
ALBUMIN UR-MCNC: NEGATIVE MG/DL
APPEARANCE UR: CLEAR
BILIRUB UR QL STRIP: NEGATIVE
COLOR UR AUTO: YELLOW
ERYTHROCYTE [DISTWIDTH] IN BLOOD BY AUTOMATED COUNT: 13.2 % (ref 10–15)
ERYTHROCYTE [SEDIMENTATION RATE] IN BLOOD BY WESTERGREN METHOD: 12 MM/HR (ref 0–30)
GLUCOSE UR STRIP-MCNC: NEGATIVE MG/DL
HCT VFR BLD AUTO: 46 % (ref 35–47)
HGB BLD-MCNC: 15.1 G/DL (ref 11.7–15.7)
HGB UR QL STRIP: NEGATIVE
KETONES UR STRIP-MCNC: NEGATIVE MG/DL
LEUKOCYTE ESTERASE UR QL STRIP: NEGATIVE
MCH RBC QN AUTO: 32.4 PG (ref 26.5–33)
MCHC RBC AUTO-ENTMCNC: 32.8 G/DL (ref 31.5–36.5)
MCV RBC AUTO: 99 FL (ref 78–100)
NITRATE UR QL: NEGATIVE
PH UR STRIP: 7 [PH] (ref 5–8)
PLATELET # BLD AUTO: 254 10E3/UL (ref 150–450)
RBC # BLD AUTO: 4.66 10E6/UL (ref 3.8–5.2)
SP GR UR STRIP: 1.01 (ref 1–1.03)
UROBILINOGEN UR STRIP-ACNC: 0.2 E.U./DL
WBC # BLD AUTO: 5.7 10E3/UL (ref 4–11)

## 2025-05-22 ASSESSMENT — PAIN SCALES - GENERAL: PAINLEVEL_OUTOF10: NO PAIN (0)

## 2025-05-22 NOTE — PROGRESS NOTES
[address] [address]  Phone: [phone]  Fax: [phone]    The secret of the care of the patient is in caring for the patient.  - Dr. Francis Peabody  ______________________________________________________________________     Date of Service: 5/22/2025  Primary Provider: Luz Cedillo    Patient Care Team:  Luz Cedillo NP as PCP - General  Luz Cedillo NP as Assigned PCP  Lydia Saleem PA-C as Assigned Heart and Vascular Surgical Provider     ______________________________________________________________________     Chief Complaint   Patient presents with    Abdominal Pain     Started about couple weeks ago. On the lower left abdominal pain.           5/22/2025    11:57 AM   Additional Questions   Roomed by Tyrone Escalante   Accompanied by Spouse     History of Present Illness    Kimberly Hernandez, 79 years    Abdominal Pain  - Experiencing pain in the lower left quadrant of the abdomen for a couple of weeks.  - Pain typically starts in the afternoon and continues into the evening; mornings are usually symptom-free.  - Describes the pain as relentless and very sore.  - Tylenol taken (two to four tablets) does not alleviate the pain.  - No previous experience with similar symptoms.  - No rebound tenderness reported.  - Bowel movements are regular with no diarrhea or blood in stools.  - No nausea, vomiting, fevers, or chills.  - Urination is normal.  - Last colonoscopy was 2 years ago at [facility], with no abnormalities found.  This was per patient report, but a urgent request through [facility] says they have no records of any colonoscopy in the last 10 years and not seen elsewhere in her records.  - Had a previous CAT scan for blood flow, primarily focused on the legs, which noted some thickening in the colon but no diarrhea was present at that time.  - No history of arthritis.  - Essential hypertension:  Blood pressure management is relevant due to the diagnosis of essential hypertension.   Last CT scan did show some nonspecific thickening in the transverse colon.      ______________________________________________________________________     Active Problem List:  Problem List as of 5/22/2025 Reviewed: 5/22/2025  5:48 PM by Omega Savage MD         Medium    Hyperlipidemia    Last Assessment & Plan 1/15/2025 Office Visit Written 1/16/2025  7:43 AM by Luz Cedillo NP   LDL 63 9/2024  -Continue atorvastatin 40 mg daily         Epilepsy And Recurrent Seizures    Last Assessment & Plan 1/15/2025 Office Visit Written 1/16/2025  7:41 AM by Luz Cedillo NP   Stable with phenytoin         Vitamin D Deficiency    Osteoarthritis Of The Knee    Joint Pain, Localized In The Knee    Osteopenia- Reclast 10/17/19 restarted    Last Assessment & Plan 1/15/2025 Office Visit Written 1/16/2025  7:44 AM by Luz Cedillo NP   Repeat DEXA scan.  If there is stability or notable improvement, we can likely discontinue Reclast infusions for 3-5 years          DNR (do not resuscitate)    Mild vascular neurocognitive disorder    Last Assessment & Plan 1/15/2025 Office Visit Written 1/16/2025  7:43 AM by Luz Cedillo NP   Mild impairments, forgetful. Managing well independently          Cerebral aneurysm, nonruptured- repeat imaging 4/2027 with MRA    Last Assessment & Plan 1/15/2025 Office Visit Written 1/16/2025  7:40 AM by Luz Cedillo NP   Recent CTA shows stability.  Next MRI is due 4/2027         Tobacco use    Last Assessment & Plan 1/15/2025 Office Visit Written 1/16/2025  7:45 AM by Luz Cedillo NP   Continues to smoke cigarettes          Aneurysm of carotid artery    Last Assessment & Plan 1/15/2025 Office Visit Written 1/16/2025  7:39 AM by Luz Cedillo NP   CTA 4/2024 showed stability of existing aneurysms. Repeat 4/2025         Essential hypertension    Last Assessment & Plan 1/15/2025 Office Visit Written 1/16/2025  7:43 AM by  Luz Cedillo NP   Blood pressure is elevated today.  -Discontinue hydrochlorothiazide and start chlorthalidone 25 mg daily in its place  -Continue with amlodipine 10 mg daily  -If blood pressure remains elevated at her annual wellness visit in March, we could consider adding lisinopril         Seborrheic keratoses    Last Assessment & Plan 10/17/2023 Office Visit Written 10/19/2023  8:50 AM by Luz Cedillo NP   Referral is placed to dermatology located in this building, [facility].         Psoriasis    PAD (peripheral artery disease)    Last Assessment & Plan 1/15/2025 Office Visit Written 1/16/2025  7:44 AM by Luz Cedillo NP   She is due for a follow-up ultrasound and appointment with vascular clinic.  Encouraged her to schedule an appointment.         CSA (central sleep apnea)    Last Assessment & Plan 1/15/2025 Office Visit Written 1/16/2025  7:41 AM by Luz Cedillo NP   Upcoming follow-up appointment with sleep medicine in March         Constipation, unspecified constipation type    Last Assessment & Plan 1/15/2025 Office Visit Written 1/16/2025  7:47 AM by Luz Cedillo NP   - Try miralax 1 capful twice daily until you have a sizeable bowel movement. If not having results after 5 days, add senna-docusate daily along with miralax to see if this helps move things along   - After you feel like you have emptied, reduce Miralax to 1 capful daily to maintain bowel movement consistency           Current Outpatient Medications   Medication Instructions    acetaminophen (TYLENOL) 500-1,000 mg, Oral, EVERY 6 HOURS PRN    amLODIPine (NORVASC) 10 mg, Oral, DAILY    artificial tears,hypromellose, (PURE & GENTLE) 0.3 % Drop ophthalmic drops 2 drops, Both Eyes, 4 TIMES DAILY PRN    aspirin 81 MG EC tablet 1 tablet, DAILY    atorvastatin (LIPITOR) 40 mg, Oral, DAILY    calcium 600 mg, 2 TIMES DAILY WITH MEALS    chlorthalidone (HYGROTON) 25 mg, Oral, DAILY    cilostazol (PLETAL) 50 MG tablet Take 1 tablet (50 mg) by  mouth daily for 14 days, THEN 1 tablet (50 mg) 2 times daily for 14 days, THEN 2 tablets (100 mg) 2 times daily.    cyclobenzaprine (FLEXERIL) 5-10 mg, Oral, 3 TIMES DAILY PRN    ERGOCALCIFEROL, VITAMIN D2, (VITAMIN D2 ORAL) 2 tablets, DAILY    HYDROcodone-acetaminophen (NORCO) 5-325 MG tablet 1 tablet, Oral, EVERY 8 HOURS PRN    hydrOXYzine HCl (ATARAX) 25-50 mg, Oral, EVERY 4 HOURS PRN    melatonin 5 mg, AT BEDTIME PRN    multivitamin therapeutic (THERAGRAN) tablet 1 tablet, DAILY    phenytoin (DILANTIN) 100 MG ER capsule Take 2 capsules by mouth every morning and 1 capsule every evening    SENNA-docusate sodium (SENNA S) 8.6-50 MG tablet 1 tablet, Oral, AT BEDTIME    varenicline (CHANTIX CECILIO) 0.5 MG X 11 & 1 MG X 42 tablet Take 0.5 mg tab daily for 3 days, THEN 0.5 mg tab twice daily for 4 days, THEN 1 mg twice daily.    varenicline (CHANTIX CECILIO) 0.5 MG X 11 & 1 MG X 42 tablet Take 0.5 mg tab daily for 3 days, THEN 0.5 mg tab twice daily for 4 days, THEN 1 mg twice daily.     Social History     Social History Narrative    Previously worked as a nurse practitioner in women's health.        Has a significant other who is closely involved.      ______________________________________________________________________     Wt Readings from Last 3 Encounters:   05/22/25 58.1 kg (128 lb)   04/30/25 58.2 kg (128 lb 3.2 oz)   01/15/25 57.4 kg (126 lb 9.6 oz)     BP Readings from Last 3 Encounters:   05/22/25 118/52   01/15/25 (!) 142/60   10/21/24 (!) 162/71     /52   Pulse 80   Temp 97.7  F (36.5  C) (Oral)   Resp 12   Ht 1.524 m (5')   Wt 58.1 kg (128 lb)   LMP  (LMP Unknown)   SpO2 100%   BMI 25.00 kg/m     The patient is comfortable, no acute distress.  Mood good.  Insight fair to good.  Eyes are nonicteric.  Neck is supple without mass.  No cervical adenopathy.  No thyromegaly. Heart regular rate and rhythm.  Lungs clear to auscultation bilaterally.  Respiratory effort is good.  Extremities no edema.   Abdomen shows no hernia, bowel sounds normal.  No hepatosplenomegaly.  Minimally tender in the left lower quadrant without rebound.  ______________________________________________________________________     Results for orders placed or performed in visit on 05/22/25   CBC with platelets     Status: Normal   Result Value Ref Range    WBC Count 5.7 4.0 - 11.0 10e3/uL    RBC Count 4.66 3.80 - 5.20 10e6/uL    Hemoglobin 15.1 11.7 - 15.7 g/dL    Hematocrit 46.0 35.0 - 47.0 %    MCV 99 78 - 100 fL    MCH 32.4 26.5 - 33.0 pg    MCHC 32.8 31.5 - 36.5 g/dL    RDW 13.2 10.0 - 15.0 %    Platelet Count 254 150 - 450 10e3/uL   Erythrocyte sedimentation rate auto     Status: Normal   Result Value Ref Range    Erythrocyte Sedimentation Rate 12 0 - 30 mm/hr   UA Macroscopic with reflex to Microscopic and Culture     Status: Normal    Specimen: Urine, NOS   Result Value Ref Range    Color Urine Yellow Colorless, Straw, Light Yellow, Yellow    Appearance Urine Clear Clear    Glucose Urine Negative Negative mg/dL    Bilirubin Urine Negative Negative    Ketones Urine Negative Negative mg/dL    Specific Gravity Urine 1.010 1.005 - 1.030    Blood Urine Negative Negative    pH Urine 7.0 5.0 - 8.0    Protein Albumin Urine Negative Negative mg/dL    Urobilinogen Urine 0.2 0.2, 1.0 E.U./dL    Nitrite Urine Negative Negative    Leukocyte Esterase Urine Negative Negative    Narrative    Microscopic not indicated      ______________________________________________________________________     Diagnoses managed today:    1. Abdominal pain, left lower quadrant    2. Abnormal CT of the abdomen    3. Constipation, unspecified constipation type    4. Essential hypertension       ______________________________________________________________________   Assessment & Plan     Abdominal pain, left lower quadrant  - Pain in the left lower quadrant is atypical for diverticulitis due to lack of consistent symptoms. Differential diagnosis includes issues  with the colon, ovaries, bladder. Unlikely to be a kidney stone or diverticulitis.  - Recommend blood work including liver tests, kidney tests, CBC, CRP, and sed rate. Consider a urine sample to rule out bladder issues. Potential for a CAT scan if blood work does not provide clarity. Obtained records from [facility] for the most recent colonoscopy.  - Risks and side effects: Discussed the risk of cancer, though not strongly suspected based on previous CAT scan from August.    Abnormal CT of the abdomen  - Previous CAT scan noted some thickening in the colon. Further investigation is warranted to rule out any significant pathology.    Constipation, unspecified constipation type  - Although bowel movements are reported as regular, the possibility of constipation contributing to abdominal pain should be considered.    Essential hypertension  - Blood pressure management is crucial. Monitor and adjust treatment as necessary to maintain optimal blood pressure control.        Continue current medications otherwise.  Follow up sooner if issues.    Orders Placed This Encounter   Procedures    CBC with platelets    Comprehensive metabolic panel    CRP inflammation    Erythrocyte sedimentation rate auto    Lipase    UA Macroscopic with reflex to Microscopic and Culture      Issues to follow up on:  Follow up blood work and urinalysis.  Consider a follow up CT scan with contrast.  Role of colonoscopy to be considered if needed.    Omega Savage MD  General Internal Medicine  [facility]    The longitudinal plan of care for the diagnoses and conditions as documented were addressed during this visit. Due to the added complexity in care, I will continue to support Kimberly in the subsequent management and with ongoing continuity of care.     Return in about 2 months (around 7/22/2025) for annual wellness visit, follow up with your primary care provider.     Future Appointments   Date Time  Provider Department [place]   4/27/2026 12:00 PM Skyler Duarte APRN [facility]

## 2025-05-22 NOTE — NURSING NOTE
GAVI done for [facility] and faxed out 05/22/25 to fax number [phone].    Copies made for monthly hold bin and sent to scan.

## 2025-05-23 ENCOUNTER — RESULTS FOLLOW-UP (OUTPATIENT)
Dept: INTERNAL MEDICINE | Facility: CLINIC | Age: 80
End: 2025-05-23

## 2025-05-23 DIAGNOSIS — R10.32 ABDOMINAL PAIN, LEFT LOWER QUADRANT: Primary | ICD-10-CM

## 2025-05-23 DIAGNOSIS — R93.5 ABNORMAL CT OF THE ABDOMEN: ICD-10-CM

## 2025-05-23 NOTE — TELEPHONE ENCOUNTER
Please call patient -    ______________________________________________________________________     Home phone:  [phone] (home)     Cell phone:   Telephone Information:   Mobile [phone]       Other contacts:  Name Home Phone Work Phone Mobile Phone Relationship Lgl RONALDO Lopez   [phone] Friend      ______________________________________________________________________     Your blood counts are normal and you are not anemic. White blood cell count normal.    Your kidney tests are normal.  Your electrolytes are also normal.  There is no signs of diabetes.  Your liver tests are normal.      Your pancreas tests were normal.     Your urine test was normal.  Your markers of inflammation are normal.     I am recommending a CT scan of the abdomen as a next step.  Orders placed.    Omega Savage MD  [facility]  5/23/2025, 8:35 AM     ______________________________________________________________________    Recent Results (from the past 240 hours)   CBC with platelets    Collection Time: 05/22/25 12:43 PM   Result Value Ref Range    WBC Count 5.7 4.0 - 11.0 10e3/uL    RBC Count 4.66 3.80 - 5.20 10e6/uL    Hemoglobin 15.1 11.7 - 15.7 g/dL    Hematocrit 46.0 35.0 - 47.0 %    MCV 99 78 - 100 fL    MCH 32.4 26.5 - 33.0 pg    MCHC 32.8 31.5 - 36.5 g/dL    RDW 13.2 10.0 - 15.0 %    Platelet Count 254 150 - 450 10e3/uL   Comprehensive metabolic panel    Collection Time: 05/22/25 12:43 PM   Result Value Ref Range    Sodium 138 135 - 145 mmol/L    Potassium 4.9 3.4 - 5.3 mmol/L    Carbon Dioxide (CO2) 26 22 - 29 mmol/L    Anion Gap 11 7 - 15 mmol/L    Urea Nitrogen 15.5 8.0 - 23.0 mg/dL    Creatinine 0.88 0.51 - 0.95 mg/dL    GFR Estimate 67 >60 mL/min/1.73m2    Calcium 10.1 8.8 - 10.4 mg/dL    Chloride 101 98 - 107 mmol/L    Glucose 117 (H) 70 - 99 mg/dL    Alkaline Phosphatase 156 (H) 40 - 150 U/L    AST 26 0 - 45 U/L    ALT 16 0 - 50 U/L    Protein Total 7.6 6.4 - 8.3 g/dL     Albumin 4.6 3.5 - 5.2 g/dL    Bilirubin Total 0.3 <=1.2 mg/dL   CRP inflammation    Collection Time: 05/22/25 12:43 PM   Result Value Ref Range    CRP Inflammation <3.00 <5.00 mg/L   Erythrocyte sedimentation rate auto    Collection Time: 05/22/25 12:43 PM   Result Value Ref Range    Erythrocyte Sedimentation Rate 12 0 - 30 mm/hr   Lipase    Collection Time: 05/22/25 12:43 PM   Result Value Ref Range    Lipase 27 13 - 60 U/L   UA Macroscopic with reflex to Microscopic and Culture    Collection Time: 05/22/25 12:48 PM    Specimen: Urine, NOS   Result Value Ref Range    Color Urine Yellow Colorless, Straw, Light Yellow, Yellow    Appearance Urine Clear Clear    Glucose Urine Negative Negative mg/dL    Bilirubin Urine Negative Negative    Ketones Urine Negative Negative mg/dL    Specific Gravity Urine 1.010 1.005 - 1.030    Blood Urine Negative Negative    pH Urine 7.0 5.0 - 8.0    Protein Albumin Urine Negative Negative mg/dL    Urobilinogen Urine 0.2 0.2, 1.0 E.U./dL    Nitrite Urine Negative Negative    Leukocyte Esterase Urine Negative Negative      ______________________________________________________________________

## 2025-06-03 ENCOUNTER — HOSPITAL ENCOUNTER (OUTPATIENT)
Dept: CT IMAGING | Facility: HOSPITAL | Age: 80
Discharge: HOME | End: 2025-06-03
Attending: INTERNAL MEDICINE
Payer: COMMERCIAL

## 2025-06-03 DIAGNOSIS — R93.5 ABNORMAL CT OF THE ABDOMEN: ICD-10-CM

## 2025-06-03 DIAGNOSIS — R10.32 ABDOMINAL PAIN, LEFT LOWER QUADRANT: ICD-10-CM

## 2025-06-03 PROCEDURE — 250N000011 HC RX IP 250 OP 636: Performed by: INTERNAL MEDICINE

## 2025-06-03 PROCEDURE — 74177 CT ABD & PELVIS W/CONTRAST: CPT

## 2025-06-03 RX ORDER — IOPAMIDOL 755 MG/ML
90 INJECTION, SOLUTION INTRAVASCULAR ONCE
Status: COMPLETED | OUTPATIENT
Start: 2025-06-03 | End: 2025-06-03

## 2025-06-03 RX ADMIN — IOPAMIDOL 90 ML: 755 INJECTION, SOLUTION INTRAVENOUS at 12:03

## 2025-06-04 ENCOUNTER — RESULTS FOLLOW-UP (OUTPATIENT)
Dept: INTERNAL MEDICINE | Facility: CLINIC | Age: 80
End: 2025-06-04

## 2025-06-04 DIAGNOSIS — K59.09 CONSTIPATION, CHRONIC: Primary | ICD-10-CM

## 2025-06-04 RX ORDER — SENNOSIDES 8.6 MG
2 TABLET ORAL DAILY
Qty: 60 TABLET | Refills: 1 | Status: SHIPPED | OUTPATIENT
Start: 2025-06-04 | End: 2025-07-04

## 2025-07-14 DIAGNOSIS — G40.909 NONINTRACTABLE EPILEPSY WITHOUT STATUS EPILEPTICUS, UNSPECIFIED EPILEPSY TYPE (H): ICD-10-CM

## 2025-07-14 DIAGNOSIS — I10 ESSENTIAL HYPERTENSION: ICD-10-CM

## 2025-07-14 RX ORDER — AMLODIPINE BESYLATE 10 MG/1
10 TABLET ORAL DAILY
Qty: 90 TABLET | Refills: 2 | Status: SHIPPED | OUTPATIENT
Start: 2025-07-14

## 2025-07-14 RX ORDER — CHLORTHALIDONE 25 MG/1
25 TABLET ORAL DAILY
Qty: 90 TABLET | Refills: 2 | Status: SHIPPED | OUTPATIENT
Start: 2025-07-14

## 2025-07-14 RX ORDER — PHENYTOIN SODIUM 100 MG/1
CAPSULE, EXTENDED RELEASE ORAL
Qty: 270 CAPSULE | Refills: 1 | Status: SHIPPED | OUTPATIENT
Start: 2025-07-14

## 2025-07-30 ENCOUNTER — TELEPHONE (OUTPATIENT)
Dept: INTERNAL MEDICINE | Facility: CLINIC | Age: 80
End: 2025-07-30
Payer: COMMERCIAL

## 2025-07-30 DIAGNOSIS — I73.9 PAD (PERIPHERAL ARTERY DISEASE): ICD-10-CM

## 2025-07-30 DIAGNOSIS — E78.2 MIXED HYPERLIPIDEMIA: ICD-10-CM

## 2025-07-30 RX ORDER — ATORVASTATIN CALCIUM 40 MG/1
40 TABLET, FILM COATED ORAL DAILY
Qty: 100 TABLET | Refills: 1 | Status: SHIPPED | OUTPATIENT
Start: 2025-07-30

## 2025-07-30 NOTE — TELEPHONE ENCOUNTER
Patient has [facility] coverage and with this insurance plan, the patient is eligible to receive certain prescriptions as a 100-day supply at the 90-day supply cost.      Prescriptions updated to 100-day supply per protocol: Claudia ChadwickD, [facility]  Population Health Pharmacist  [phone]

## (undated) RX ORDER — LIDOCAINE HYDROCHLORIDE 10 MG/ML
INJECTION, SOLUTION INFILTRATION; PERINEURAL
Status: DISPENSED
Start: 2023-08-01

## (undated) RX ORDER — HEPARIN SODIUM 1000 [USP'U]/ML
INJECTION, SOLUTION INTRAVENOUS; SUBCUTANEOUS
Status: DISPENSED
Start: 2023-08-01

## (undated) RX ORDER — LIDOCAINE HYDROCHLORIDE 10 MG/ML
INJECTION, SOLUTION INFILTRATION; PERINEURAL
Status: DISPENSED
Start: 2024-09-25

## (undated) RX ORDER — PROTAMINE SULFATE 10 MG/ML
INJECTION, SOLUTION INTRAVENOUS
Status: DISPENSED
Start: 2024-09-25

## (undated) RX ORDER — HEPARIN SODIUM 1000 [USP'U]/ML
INJECTION, SOLUTION INTRAVENOUS; SUBCUTANEOUS
Status: DISPENSED
Start: 2024-09-25

## (undated) RX ORDER — FENTANYL CITRATE 50 UG/ML
INJECTION, SOLUTION INTRAMUSCULAR; INTRAVENOUS
Status: DISPENSED
Start: 2023-08-01

## (undated) RX ORDER — CLOPIDOGREL BISULFATE 75 MG/1
TABLET ORAL
Status: DISPENSED
Start: 2023-08-01

## (undated) RX ORDER — FENTANYL CITRATE 50 UG/ML
INJECTION, SOLUTION INTRAMUSCULAR; INTRAVENOUS
Status: DISPENSED
Start: 2024-09-25